# Patient Record
Sex: FEMALE | Race: WHITE | Employment: OTHER | URBAN - NONMETROPOLITAN AREA
[De-identification: names, ages, dates, MRNs, and addresses within clinical notes are randomized per-mention and may not be internally consistent; named-entity substitution may affect disease eponyms.]

---

## 2017-01-01 ENCOUNTER — OFFICE VISIT (OUTPATIENT)
Dept: URGENT CARE | Age: 52
End: 2017-01-01
Payer: COMMERCIAL

## 2017-01-01 VITALS
TEMPERATURE: 99 F | SYSTOLIC BLOOD PRESSURE: 121 MMHG | OXYGEN SATURATION: 97 % | BODY MASS INDEX: 21.53 KG/M2 | DIASTOLIC BLOOD PRESSURE: 80 MMHG | HEIGHT: 66 IN | RESPIRATION RATE: 18 BRPM | WEIGHT: 134 LBS | HEART RATE: 101 BPM

## 2017-01-01 DIAGNOSIS — H65.91 OTITIS MEDIA WITH EFFUSION, RIGHT: Primary | ICD-10-CM

## 2017-01-01 DIAGNOSIS — J02.9 SORE THROAT: ICD-10-CM

## 2017-01-01 DIAGNOSIS — J01.40 ACUTE NON-RECURRENT PANSINUSITIS: ICD-10-CM

## 2017-01-01 LAB — S PYO AG THROAT QL: NORMAL

## 2017-01-01 PROCEDURE — 87880 STREP A ASSAY W/OPTIC: CPT | Performed by: NURSE PRACTITIONER

## 2017-01-01 PROCEDURE — 99213 OFFICE O/P EST LOW 20 MIN: CPT | Performed by: NURSE PRACTITIONER

## 2017-01-01 RX ORDER — ESTRADIOL 1 MG/1
1 TABLET ORAL DAILY
COMMUNITY
Start: 2016-11-29

## 2017-01-01 RX ORDER — PANTOPRAZOLE SODIUM 40 MG/1
40 TABLET, DELAYED RELEASE ORAL DAILY
COMMUNITY
Start: 2016-12-08 | End: 2019-03-25 | Stop reason: ALTCHOICE

## 2017-01-01 RX ORDER — CETIRIZINE HYDROCHLORIDE 10 MG/1
10 TABLET ORAL DAILY
COMMUNITY
End: 2018-07-17 | Stop reason: CLARIF

## 2017-01-01 RX ORDER — TEMAZEPAM 30 MG/1
30 CAPSULE ORAL DAILY
COMMUNITY
Start: 2016-12-29 | End: 2018-04-17 | Stop reason: SDUPTHER

## 2017-01-01 RX ORDER — AMOXICILLIN AND CLAVULANATE POTASSIUM 875; 125 MG/1; MG/1
1 TABLET, FILM COATED ORAL EVERY 12 HOURS
Qty: 20 TABLET | Refills: 0 | Status: SHIPPED | OUTPATIENT
Start: 2017-01-01 | End: 2017-01-11

## 2017-01-01 RX ORDER — VENLAFAXINE HYDROCHLORIDE 75 MG/1
75 CAPSULE, EXTENDED RELEASE ORAL DAILY
COMMUNITY
Start: 2016-12-08 | End: 2018-07-17

## 2017-01-01 RX ORDER — POLYETHYLENE GLYCOL 3350 17 G/17G
1 POWDER, FOR SOLUTION ORAL PRN
COMMUNITY
Start: 2016-11-14

## 2017-01-01 ASSESSMENT — ENCOUNTER SYMPTOMS
TROUBLE SWALLOWING: 1
SINUS PRESSURE: 1
GASTROINTESTINAL NEGATIVE: 1
SORE THROAT: 1
RHINORRHEA: 0
COUGH: 1

## 2018-02-05 ENCOUNTER — OFFICE VISIT (OUTPATIENT)
Dept: URGENT CARE | Age: 53
End: 2018-02-05
Payer: COMMERCIAL

## 2018-02-05 VITALS
SYSTOLIC BLOOD PRESSURE: 114 MMHG | OXYGEN SATURATION: 100 % | WEIGHT: 140.4 LBS | TEMPERATURE: 98.9 F | HEIGHT: 66 IN | DIASTOLIC BLOOD PRESSURE: 76 MMHG | HEART RATE: 92 BPM | BODY MASS INDEX: 22.56 KG/M2 | RESPIRATION RATE: 18 BRPM

## 2018-02-05 DIAGNOSIS — R52 BODY ACHES: Primary | ICD-10-CM

## 2018-02-05 LAB
INFLUENZA A ANTIBODY: NEGATIVE
INFLUENZA B ANTIBODY: NEGATIVE

## 2018-02-05 PROCEDURE — 99213 OFFICE O/P EST LOW 20 MIN: CPT | Performed by: NURSE PRACTITIONER

## 2018-02-05 PROCEDURE — 87804 INFLUENZA ASSAY W/OPTIC: CPT | Performed by: NURSE PRACTITIONER

## 2018-02-05 RX ORDER — GRAPE SEED EXT/BIOFLAV,CITRUS 50MG-250MG
540 CAPSULE ORAL
Status: ON HOLD | COMMUNITY
End: 2021-02-09 | Stop reason: ALTCHOICE

## 2018-02-05 RX ORDER — FLUTICASONE PROPIONATE 50 MCG
2 SPRAY, SUSPENSION (ML) NASAL
COMMUNITY
End: 2021-11-15 | Stop reason: ALTCHOICE

## 2018-02-05 RX ORDER — METHYLPREDNISOLONE 4 MG/1
4 TABLET ORAL SEE ADMIN INSTRUCTIONS
COMMUNITY
End: 2018-04-17 | Stop reason: CLARIF

## 2018-02-05 ASSESSMENT — ENCOUNTER SYMPTOMS
VOMITING: 0
DIARRHEA: 0
NAUSEA: 0
RHINORRHEA: 0
SHORTNESS OF BREATH: 0
SINUS PRESSURE: 0
SORE THROAT: 0
ABDOMINAL PAIN: 0
COUGH: 0

## 2018-02-05 NOTE — PROGRESS NOTES
[address] [facility]  [address] [facility] [place] [address]  Dept: [phone]  Loc: [phone]    Ronnie Santana is a 46 y.o. female who presents today for her medical conditions/complaints as noted below. Ronnie Santana is c/o of Generalized Body Aches; Fever; and Fatigue        HPI:     MARTIN Patten presents today with fatigue that started yesterday. She also presents with body aches that started this morning. She reports chills and a fever of 99 this morning. She reports that she feels worse as the day has progressed. She denies any abdominal pain, nausea, vomiting, and diarrhea. She denies any upper respiratory symptoms. Past Medical History:   Diagnosis Date    Allergic rhinitis     Anxiety     Irritable bowel syndrome     Sinus infection       Past Surgical History:   Procedure Laterality Date    HYSTERECTOMY      SINUS SURGERY      TONSILLECTOMY      WISDOM TOOTH EXTRACTION         Family History   Problem Relation Age of Onset    Other Father        Social History   Substance Use Topics    Smoking status: Never Smoker    Smokeless tobacco: Never Used    Alcohol use No      Current Outpatient Prescriptions   Medication Sig Dispense Refill    Black Cohosh 540 MG CAPS Take 540 mg by mouth      fluticasone (FLONASE) 50 MCG/ACT nasal spray 2 sprays      methylPREDNISolone (MEDROL DOSEPACK) 4 MG tablet Take 4 mg by mouth See Admin Instructions Take by mouth.       estradiol (ESTRACE) 2 MG tablet Take 2 mg by mouth daily      pantoprazole (PROTONIX) 40 MG tablet Take 40 mg by mouth daily      polyethylene glycol (GLYCOLAX) powder Take 1 g by mouth as needed      venlafaxine (EFFEXOR XR) 37.5 MG extended release capsule Take 1 capsule by mouth daily      temazepam (RESTORIL) 30 MG capsule Take 30 mg by mouth daily      cetirizine (ZYRTEC) 10 MG tablet Take 10 mg by mouth daily       No current facility-administered medications for this mood and affect. Her behavior is normal.     /76   Pulse 92   Temp 98.9 °F (37.2 °C) (Oral)   Resp 18   Ht 5' 6\" (1.676 m)   Wt 140 lb 6.4 oz (63.7 kg)   SpO2 100%   BMI 22.66 kg/m²     Assessment:      1. Body aches  POCT Influenza A/B       Plan:   Flu in office negative. Pt to take tylenol for body aches. Monitor for fever. Monitor for worsening symptoms. Encouraged rest and increased fluid intake. Return to clinic if worsening symptoms or high fevers. Orders Placed This Encounter   Procedures    POCT Influenza A/B     Results for orders placed or performed in visit on 02/05/18   POCT Influenza A/B   Result Value Ref Range    Influenza A Ab NEGATIVE     Influenza B Ab NEGATIVE          Return if symptoms worsen or fail to improve. No orders of the defined types were placed in this encounter. Patient given educational materials - see patient instructions. Discussed use, benefit, and side effects of prescribed medications. All patient questions answered. Pt voiced understanding. Reviewed health maintenance. Instructed to continue current medications, diet and exercise. Patient agreed with treatment plan. Follow up as directed. Patient Instructions     1. Take tylenol for body aches and fever  2. Monitor for fever  3. Rest and increased fluid intake  4. If worsening symptoms return to clinic     Patient Education        Fatigue: Care Instructions  Your Care Instructions    Fatigue is a feeling of tiredness, exhaustion, or lack of energy. You may feel fatigue because of too much or not enough activity. It can also come from stress, lack of sleep, boredom, and poor diet. Many medical problems, such as viral infections, can cause fatigue. Emotional problems, especially depression, are often the cause of fatigue. Fatigue is most often a symptom of another problem. Treatment for fatigue depends on the cause.  For example, if you have fatigue because you have a certain health problem, treating this problem also treats your fatigue. If depression or anxiety is the cause, treatment may help. Follow-up care is a key part of your treatment and safety. Be sure to make and go to all appointments, and call your doctor if you are having problems. It's also a good idea to know your test results and keep a list of the medicines you take. How can you care for yourself at home? · Get regular exercise. But don't overdo it. Go back and forth between rest and exercise. · Get plenty of rest.  · Eat a healthy diet. Do not skip meals, especially breakfast.  · Reduce your use of caffeine, tobacco, and alcohol. Caffeine is most often found in coffee, tea, cola drinks, and chocolate. · Limit medicines that can cause fatigue. This includes tranquilizers and cold and allergy medicines. When should you call for help? Watch closely for changes in your health, and be sure to contact your doctor if:  ? · You have new symptoms such as fever or a rash. ? · Your fatigue gets worse. ? · You have been feeling down, depressed, or hopeless. Or you may have lost interest in things that you usually enjoy. ? · You are not getting better as expected. Where can you learn more? Go to [email or web address] org and sign in to your [employer] account. Enter A874 in the [employer] box to learn more about \"Fatigue: Care Instructions. \"     If you do not have an account, please click on the \"Sign Up Now\" link. Current as of: March 20, 2017  Content Version: 11.5  © 1333-6139 Healthwise, Incorporated. Care instructions adapted under license by [facility] [employer] [facility] ([facility]). If you have questions about a medical condition or this instruction, always ask your healthcare professional. [place] [address] any warranty or liability for your use of this information.              Electronically signed by GRACIE Oh on 2/5/2018 at 2:06 PM

## 2018-04-17 ENCOUNTER — OFFICE VISIT (OUTPATIENT)
Dept: INTERNAL MEDICINE | Age: 53
End: 2018-04-17
Payer: COMMERCIAL

## 2018-04-17 VITALS
OXYGEN SATURATION: 99 % | BODY MASS INDEX: 21.69 KG/M2 | HEART RATE: 101 BPM | SYSTOLIC BLOOD PRESSURE: 120 MMHG | DIASTOLIC BLOOD PRESSURE: 76 MMHG | HEIGHT: 66 IN | WEIGHT: 135 LBS

## 2018-04-17 DIAGNOSIS — E28.39 MENOPAUSE OVARIAN FAILURE: ICD-10-CM

## 2018-04-17 DIAGNOSIS — Z12.12 SCREENING FOR COLORECTAL CANCER: ICD-10-CM

## 2018-04-17 DIAGNOSIS — K21.9 GASTROESOPHAGEAL REFLUX DISEASE WITHOUT ESOPHAGITIS: ICD-10-CM

## 2018-04-17 DIAGNOSIS — F41.1 GENERALIZED ANXIETY DISORDER: ICD-10-CM

## 2018-04-17 DIAGNOSIS — Z12.31 SCREENING MAMMOGRAM, ENCOUNTER FOR: ICD-10-CM

## 2018-04-17 DIAGNOSIS — Z23 NEED FOR SHINGLES VACCINE: ICD-10-CM

## 2018-04-17 DIAGNOSIS — Z00.00 ANNUAL PHYSICAL EXAM: Primary | ICD-10-CM

## 2018-04-17 DIAGNOSIS — Z12.11 SCREENING FOR COLORECTAL CANCER: ICD-10-CM

## 2018-04-17 PROCEDURE — 99396 PREV VISIT EST AGE 40-64: CPT | Performed by: INTERNAL MEDICINE

## 2018-04-17 RX ORDER — BUDESONIDE 0.5 MG/2ML
500 INHALANT ORAL
COMMUNITY
Start: 2017-01-25 | End: 2019-04-22 | Stop reason: CLARIF

## 2018-04-17 RX ORDER — TEMAZEPAM 30 MG/1
30 CAPSULE ORAL DAILY
Qty: 30 CAPSULE | Refills: 2 | Status: SHIPPED | OUTPATIENT
Start: 2018-04-17 | End: 2018-07-17 | Stop reason: SDUPTHER

## 2018-04-17 RX ORDER — CETIRIZINE HYDROCHLORIDE, PSEUDOEPHEDRINE HYDROCHLORIDE 5; 120 MG/1; MG/1
1 TABLET, FILM COATED, EXTENDED RELEASE ORAL
COMMUNITY
Start: 2017-02-22 | End: 2019-03-25 | Stop reason: ALTCHOICE

## 2018-04-17 ASSESSMENT — PATIENT HEALTH QUESTIONNAIRE - PHQ9
SUM OF ALL RESPONSES TO PHQ9 QUESTIONS 1 & 2: 0
SUM OF ALL RESPONSES TO PHQ QUESTIONS 1-9: 0
2. FEELING DOWN, DEPRESSED OR HOPELESS: 0
1. LITTLE INTEREST OR PLEASURE IN DOING THINGS: 0

## 2018-04-24 ENCOUNTER — PATIENT MESSAGE (OUTPATIENT)
Dept: INTERNAL MEDICINE | Age: 53
End: 2018-04-24

## 2018-04-24 DIAGNOSIS — Z00.00 ANNUAL PHYSICAL EXAM: ICD-10-CM

## 2018-04-24 DIAGNOSIS — E28.39 MENOPAUSE OVARIAN FAILURE: ICD-10-CM

## 2018-04-24 LAB
ALBUMIN SERPL-MCNC: 4.2 G/DL (ref 3.5–5.2)
ALP BLD-CCNC: 70 U/L (ref 35–104)
ALT SERPL-CCNC: 17 U/L (ref 5–33)
ANION GAP SERPL CALCULATED.3IONS-SCNC: 11 MMOL/L (ref 7–19)
AST SERPL-CCNC: 22 U/L (ref 5–32)
BACTERIA: ABNORMAL /HPF
BASOPHILS ABSOLUTE: 0 K/UL (ref 0–0.2)
BASOPHILS RELATIVE PERCENT: 0.4 % (ref 0–1)
BILIRUB SERPL-MCNC: 0.5 MG/DL (ref 0.2–1.2)
BILIRUBIN URINE: NEGATIVE
BLOOD, URINE: NEGATIVE
BUN BLDV-MCNC: 14 MG/DL (ref 6–20)
CALCIUM SERPL-MCNC: 9.4 MG/DL (ref 8.6–10)
CHLORIDE BLD-SCNC: 103 MMOL/L (ref 98–111)
CHOLESTEROL, TOTAL: 190 MG/DL (ref 160–199)
CLARITY: ABNORMAL
CO2: 29 MMOL/L (ref 22–29)
COLOR: YELLOW
CREAT SERPL-MCNC: 0.6 MG/DL (ref 0.5–0.9)
EOSINOPHILS ABSOLUTE: 0.1 K/UL (ref 0–0.6)
EOSINOPHILS RELATIVE PERCENT: 1.3 % (ref 0–5)
EPITHELIAL CELLS, UA: 14 /HPF (ref 0–5)
GFR NON-AFRICAN AMERICAN: >60
GLUCOSE BLD-MCNC: 92 MG/DL (ref 74–109)
GLUCOSE URINE: NEGATIVE MG/DL
HCT VFR BLD CALC: 40.8 % (ref 37–47)
HDLC SERPL-MCNC: 82 MG/DL (ref 65–121)
HEMOGLOBIN: 12.8 G/DL (ref 12–16)
HYALINE CASTS: 1 /HPF (ref 0–8)
KETONES, URINE: NEGATIVE MG/DL
LDL CHOLESTEROL CALCULATED: 85 MG/DL
LEUKOCYTE ESTERASE, URINE: ABNORMAL
LYMPHOCYTES ABSOLUTE: 1.8 K/UL (ref 1.1–4.5)
LYMPHOCYTES RELATIVE PERCENT: 37.6 % (ref 20–40)
MCH RBC QN AUTO: 28.6 PG (ref 27–31)
MCHC RBC AUTO-ENTMCNC: 31.4 G/DL (ref 33–37)
MCV RBC AUTO: 91.3 FL (ref 81–99)
MONOCYTES ABSOLUTE: 0.5 K/UL (ref 0–0.9)
MONOCYTES RELATIVE PERCENT: 10.1 % (ref 0–10)
NEUTROPHILS ABSOLUTE: 2.3 K/UL (ref 1.5–7.5)
NEUTROPHILS RELATIVE PERCENT: 50.4 % (ref 50–65)
NITRITE, URINE: NEGATIVE
PDW BLD-RTO: 16.2 % (ref 11.5–14.5)
PH UA: 7
PLATELET # BLD: 188 K/UL (ref 130–400)
PMV BLD AUTO: 11.3 FL (ref 9.4–12.3)
POTASSIUM SERPL-SCNC: 4.2 MMOL/L (ref 3.5–5)
PROTEIN UA: NEGATIVE MG/DL
RBC # BLD: 4.47 M/UL (ref 4.2–5.4)
RBC UA: 1 /HPF (ref 0–4)
SODIUM BLD-SCNC: 143 MMOL/L (ref 136–145)
SPECIFIC GRAVITY UA: 1.02
TOTAL PROTEIN: 6.9 G/DL (ref 6.6–8.7)
TRIGL SERPL-MCNC: 117 MG/DL (ref 0–149)
TSH SERPL DL<=0.05 MIU/L-ACNC: 2.59 UIU/ML (ref 0.27–4.2)
UROBILINOGEN, URINE: 0.2 E.U./DL
VITAMIN D 25-HYDROXY: 49.7 NG/ML
WBC # BLD: 4.7 K/UL (ref 4.8–10.8)
WBC UA: 12 /HPF (ref 0–5)

## 2018-04-26 ENCOUNTER — HOSPITAL ENCOUNTER (OUTPATIENT)
Dept: WOMENS IMAGING | Age: 53
Discharge: HOME | End: 2018-04-26
Payer: COMMERCIAL

## 2018-04-26 DIAGNOSIS — Z12.31 SCREENING MAMMOGRAM, ENCOUNTER FOR: ICD-10-CM

## 2018-04-26 PROCEDURE — 77067 SCR MAMMO BI INCL CAD: CPT

## 2018-05-02 ENCOUNTER — PATIENT MESSAGE (OUTPATIENT)
Dept: INTERNAL MEDICINE | Age: 53
End: 2018-05-02

## 2018-05-02 DIAGNOSIS — R35.0 URINARY FREQUENCY: Primary | ICD-10-CM

## 2018-05-04 DIAGNOSIS — R35.0 URINARY FREQUENCY: ICD-10-CM

## 2018-05-04 LAB
BACTERIA: NEGATIVE /HPF
BILIRUBIN URINE: NEGATIVE
BLOOD, URINE: NEGATIVE
CLARITY: CLEAR
COLOR: YELLOW
EPITHELIAL CELLS, UA: 2 /HPF (ref 0–5)
GLUCOSE URINE: NEGATIVE MG/DL
HYALINE CASTS: 0 /HPF (ref 0–8)
KETONES, URINE: NEGATIVE MG/DL
LEUKOCYTE ESTERASE, URINE: ABNORMAL
NITRITE, URINE: NEGATIVE
PH UA: 6.5
PROTEIN UA: NEGATIVE MG/DL
RBC UA: 0 /HPF (ref 0–4)
SPECIFIC GRAVITY UA: 1.01
URINE REFLEX TO CULTURE: YES
UROBILINOGEN, URINE: 0.2 E.U./DL
WBC UA: 4 /HPF (ref 0–5)

## 2018-05-06 LAB — URINE CULTURE, ROUTINE: NORMAL

## 2018-05-17 PROBLEM — Z00.00 ANNUAL PHYSICAL EXAM: Status: RESOLVED | Noted: 2018-04-17 | Resolved: 2018-05-17

## 2018-05-17 PROBLEM — Z12.11 SCREENING FOR COLORECTAL CANCER: Status: RESOLVED | Noted: 2018-04-17 | Resolved: 2018-05-17

## 2018-05-17 PROBLEM — Z12.12 SCREENING FOR COLORECTAL CANCER: Status: RESOLVED | Noted: 2018-04-17 | Resolved: 2018-05-17

## 2018-05-17 PROBLEM — Z12.31 SCREENING MAMMOGRAM, ENCOUNTER FOR: Status: RESOLVED | Noted: 2018-04-17 | Resolved: 2018-05-17

## 2018-07-17 ENCOUNTER — OFFICE VISIT (OUTPATIENT)
Dept: INTERNAL MEDICINE | Age: 53
End: 2018-07-17
Payer: COMMERCIAL

## 2018-07-17 VITALS
SYSTOLIC BLOOD PRESSURE: 118 MMHG | OXYGEN SATURATION: 98 % | DIASTOLIC BLOOD PRESSURE: 68 MMHG | HEIGHT: 66 IN | HEART RATE: 82 BPM | BODY MASS INDEX: 22.34 KG/M2 | WEIGHT: 139 LBS

## 2018-07-17 DIAGNOSIS — F51.01 PRIMARY INSOMNIA: ICD-10-CM

## 2018-07-17 DIAGNOSIS — M85.89 OSTEOPENIA OF MULTIPLE SITES: ICD-10-CM

## 2018-07-17 DIAGNOSIS — F41.1 GENERALIZED ANXIETY DISORDER: Primary | ICD-10-CM

## 2018-07-17 DIAGNOSIS — R42 ORTHOSTATIC DIZZINESS: ICD-10-CM

## 2018-07-17 PROBLEM — M85.80 OSTEOPENIA: Status: ACTIVE | Noted: 2018-07-17

## 2018-07-17 PROBLEM — M85.852 OSTEOPENIA OF LEFT THIGH: Status: ACTIVE | Noted: 2018-07-17

## 2018-07-17 PROBLEM — M85.80 OSTEOPENIA: Status: RESOLVED | Noted: 2018-07-17 | Resolved: 2018-07-17

## 2018-07-17 PROCEDURE — 99214 OFFICE O/P EST MOD 30 MIN: CPT | Performed by: INTERNAL MEDICINE

## 2018-07-17 RX ORDER — TEMAZEPAM 30 MG/1
30 CAPSULE ORAL DAILY
Qty: 30 CAPSULE | Refills: 2 | Status: SHIPPED | OUTPATIENT
Start: 2018-07-17 | End: 2019-01-09 | Stop reason: SDUPTHER

## 2018-07-17 RX ORDER — VENLAFAXINE HYDROCHLORIDE 37.5 MG/1
37.5 CAPSULE, EXTENDED RELEASE ORAL DAILY
Qty: 90 CAPSULE | Refills: 1 | Status: SHIPPED | OUTPATIENT
Start: 2018-07-17 | End: 2018-09-13 | Stop reason: SDUPTHER

## 2018-07-17 ASSESSMENT — ENCOUNTER SYMPTOMS
COUGH: 0
CHEST TIGHTNESS: 0
ABDOMINAL PAIN: 0
WHEEZING: 0
SORE THROAT: 0
CONSTIPATION: 0

## 2018-07-17 NOTE — PROGRESS NOTES
hours     Bacteria, UA 05/04/2018 NEGATIVE     Hyaline Casts, UA 05/04/2018 0     WBC, UA 05/04/2018 4     RBC,  05/04/2018 0     Epi Cells 05/04/2018 2            ASSESSMENT/PLAN:    Generalized anxiety disorder    Her anxiety symptoms overall are better  Patient is wanting to decrease her Effexor dose  We are decreasing Effexor today 37.5 daily, new prescription sent in      Primary insomnia  Refill Restoril for when necessary use  [place] reviewed  [place] was reviewed  On exam today and as per discussions with the patient today there is no evidence of adverse events such as cognitive impairment, sedation, constipation or falls related to prescribed medications. There is also no evidence of aberrant behavior like lost prescriptions or early refill requests or multiple prescribers for controlled substances.     Patient  was advised NOT to attempt to drive a motor vehichle or operate any heavy machinery within 6 hrs of taking the presribed medication - restoril      Osteopenia of left thigh  I reviewed and compared patient's bone density results  According to the patient about 7 years ago she had normal bone density  Bone density that was done at orthopedic [place] in March of this year shows that her   * lumbar spine average T score is -1.45, she does have T score of -2.2 at L4  * Left hip T score -1.8, neck -1.9,troch -2.1  * Right hip total -1.6, neck -1.9,troch -1.8  * Radius total -1.5    Patient is currently 46years old, is on estrogen replacement therapy 2 mg daily  At this time I suggest that we would repeat bone density in 2 years, in the spring of 2020  Monitor this closely  Since she is on estrogen therapy would not start Boniva at this time  We have discussed with the patient however that if it was to decline further, with her strong family history of osteoporosis, we would need to start her on bisphosphonate    Orthostatic dizziness-   Discussed the patient that her dizziness symptoms are

## 2018-09-13 RX ORDER — VENLAFAXINE HYDROCHLORIDE 37.5 MG/1
37.5 CAPSULE, EXTENDED RELEASE ORAL DAILY
Qty: 30 CAPSULE | Refills: 5 | Status: SHIPPED | OUTPATIENT
Start: 2018-09-13 | End: 2019-04-22 | Stop reason: CLARIF

## 2019-01-09 ENCOUNTER — OFFICE VISIT (OUTPATIENT)
Dept: INTERNAL MEDICINE | Age: 54
End: 2019-01-09
Payer: COMMERCIAL

## 2019-01-09 VITALS
BODY MASS INDEX: 22.5 KG/M2 | DIASTOLIC BLOOD PRESSURE: 78 MMHG | HEIGHT: 66 IN | SYSTOLIC BLOOD PRESSURE: 104 MMHG | WEIGHT: 140 LBS | OXYGEN SATURATION: 98 % | HEART RATE: 79 BPM | RESPIRATION RATE: 18 BRPM

## 2019-01-09 DIAGNOSIS — Z00.00 ANNUAL PHYSICAL EXAM: ICD-10-CM

## 2019-01-09 DIAGNOSIS — F51.01 PRIMARY INSOMNIA: ICD-10-CM

## 2019-01-09 DIAGNOSIS — K21.9 GASTROESOPHAGEAL REFLUX DISEASE WITHOUT ESOPHAGITIS: ICD-10-CM

## 2019-01-09 DIAGNOSIS — M85.89 OSTEOPENIA OF MULTIPLE SITES: ICD-10-CM

## 2019-01-09 DIAGNOSIS — F41.1 GENERALIZED ANXIETY DISORDER: Primary | ICD-10-CM

## 2019-01-09 PROCEDURE — 99214 OFFICE O/P EST MOD 30 MIN: CPT | Performed by: INTERNAL MEDICINE

## 2019-01-09 RX ORDER — TEMAZEPAM 30 MG/1
30 CAPSULE ORAL DAILY
Qty: 30 CAPSULE | Refills: 2 | Status: SHIPPED | OUTPATIENT
Start: 2019-01-09 | End: 2019-02-08

## 2019-01-09 ASSESSMENT — ENCOUNTER SYMPTOMS
WHEEZING: 0
SORE THROAT: 0
CONSTIPATION: 0
CHEST TIGHTNESS: 0
ABDOMINAL PAIN: 0
COUGH: 0

## 2019-02-21 ENCOUNTER — OFFICE VISIT (OUTPATIENT)
Dept: INTERNAL MEDICINE | Age: 54
End: 2019-02-21
Payer: COMMERCIAL

## 2019-02-21 ENCOUNTER — HOSPITAL ENCOUNTER (OUTPATIENT)
Dept: GENERAL RADIOLOGY | Age: 54
Discharge: HOME | End: 2019-02-21
Payer: COMMERCIAL

## 2019-02-21 ENCOUNTER — HOSPITAL ENCOUNTER (OUTPATIENT)
Dept: NON INVASIVE DIAGNOSTICS | Age: 54
Discharge: HOME | End: 2019-02-21
Payer: COMMERCIAL

## 2019-02-21 VITALS
HEIGHT: 66 IN | OXYGEN SATURATION: 99 % | BODY MASS INDEX: 22.02 KG/M2 | WEIGHT: 137 LBS | SYSTOLIC BLOOD PRESSURE: 112 MMHG | HEART RATE: 61 BPM | DIASTOLIC BLOOD PRESSURE: 62 MMHG

## 2019-02-21 DIAGNOSIS — R06.09 DYSPNEA ON EXERTION: ICD-10-CM

## 2019-02-21 DIAGNOSIS — R06.09 DYSPNEA ON EXERTION: Primary | ICD-10-CM

## 2019-02-21 LAB
ALBUMIN SERPL-MCNC: 4.3 G/DL (ref 3.5–5.2)
ALP BLD-CCNC: 79 U/L (ref 35–104)
ALT SERPL-CCNC: 14 U/L (ref 5–33)
ANION GAP SERPL CALCULATED.3IONS-SCNC: 13 MMOL/L (ref 7–19)
AST SERPL-CCNC: 20 U/L (ref 5–32)
BASOPHILS ABSOLUTE: 0 K/UL (ref 0–0.2)
BASOPHILS RELATIVE PERCENT: 0.4 % (ref 0–1)
BILIRUB SERPL-MCNC: 0.5 MG/DL (ref 0.2–1.2)
BUN BLDV-MCNC: 13 MG/DL (ref 6–20)
CALCIUM SERPL-MCNC: 9.8 MG/DL (ref 8.6–10)
CHLORIDE BLD-SCNC: 106 MMOL/L (ref 98–111)
CO2: 26 MMOL/L (ref 22–29)
CREAT SERPL-MCNC: 0.7 MG/DL (ref 0.5–0.9)
EOSINOPHILS ABSOLUTE: 0.1 K/UL (ref 0–0.6)
EOSINOPHILS RELATIVE PERCENT: 0.9 % (ref 0–5)
GFR NON-AFRICAN AMERICAN: >60
GLUCOSE BLD-MCNC: 96 MG/DL (ref 74–109)
HCT VFR BLD CALC: 41.7 % (ref 37–47)
HEMOGLOBIN: 13 G/DL (ref 12–16)
LYMPHOCYTES ABSOLUTE: 2.5 K/UL (ref 1.1–4.5)
LYMPHOCYTES RELATIVE PERCENT: 31.3 % (ref 20–40)
MCH RBC QN AUTO: 28.7 PG (ref 27–31)
MCHC RBC AUTO-ENTMCNC: 31.2 G/DL (ref 33–37)
MCV RBC AUTO: 92.1 FL (ref 81–99)
MONOCYTES ABSOLUTE: 0.6 K/UL (ref 0–0.9)
MONOCYTES RELATIVE PERCENT: 7.8 % (ref 0–10)
NEUTROPHILS ABSOLUTE: 4.8 K/UL (ref 1.5–7.5)
NEUTROPHILS RELATIVE PERCENT: 59.5 % (ref 50–65)
PDW BLD-RTO: 16.1 % (ref 11.5–14.5)
PLATELET # BLD: 183 K/UL (ref 130–400)
PMV BLD AUTO: 11.5 FL (ref 9.4–12.3)
POTASSIUM SERPL-SCNC: 4.7 MMOL/L (ref 3.5–5)
RBC # BLD: 4.53 M/UL (ref 4.2–5.4)
SODIUM BLD-SCNC: 145 MMOL/L (ref 136–145)
TOTAL PROTEIN: 7.4 G/DL (ref 6.6–8.7)
WBC # BLD: 8.1 K/UL (ref 4.8–10.8)

## 2019-02-21 PROCEDURE — 93225 XTRNL ECG REC<48 HRS REC: CPT

## 2019-02-21 PROCEDURE — 93226 XTRNL ECG REC<48 HR SCAN A/R: CPT

## 2019-02-21 PROCEDURE — 99214 OFFICE O/P EST MOD 30 MIN: CPT | Performed by: NURSE PRACTITIONER

## 2019-02-21 PROCEDURE — 71046 X-RAY EXAM CHEST 2 VIEWS: CPT

## 2019-02-21 PROCEDURE — 93000 ELECTROCARDIOGRAM COMPLETE: CPT | Performed by: NURSE PRACTITIONER

## 2019-02-21 RX ORDER — TOPIRAMATE 25 MG/1
25 TABLET ORAL DAILY
COMMUNITY

## 2019-02-21 ASSESSMENT — ENCOUNTER SYMPTOMS
PHOTOPHOBIA: 0
COUGH: 0
COLOR CHANGE: 0
RHINORRHEA: 0
VOMITING: 0
VOICE CHANGE: 0
SHORTNESS OF BREATH: 1
NAUSEA: 0
BACK PAIN: 0

## 2019-02-28 ENCOUNTER — OFFICE VISIT (OUTPATIENT)
Dept: INTERNAL MEDICINE | Age: 54
End: 2019-02-28
Payer: COMMERCIAL

## 2019-02-28 VITALS
WEIGHT: 132 LBS | SYSTOLIC BLOOD PRESSURE: 120 MMHG | BODY MASS INDEX: 21.21 KG/M2 | DIASTOLIC BLOOD PRESSURE: 80 MMHG | HEIGHT: 66 IN | HEART RATE: 79 BPM | OXYGEN SATURATION: 99 %

## 2019-02-28 DIAGNOSIS — F41.9 ANXIETY: Chronic | ICD-10-CM

## 2019-02-28 DIAGNOSIS — R06.02 SHORTNESS OF BREATH: Primary | ICD-10-CM

## 2019-02-28 PROCEDURE — 99214 OFFICE O/P EST MOD 30 MIN: CPT | Performed by: NURSE PRACTITIONER

## 2019-02-28 RX ORDER — LORAZEPAM 0.5 MG/1
0.5 TABLET ORAL EVERY 8 HOURS PRN
Qty: 20 TABLET | Refills: 0 | Status: SHIPPED | OUTPATIENT
Start: 2019-02-28 | End: 2019-03-30

## 2019-02-28 ASSESSMENT — ENCOUNTER SYMPTOMS
PHOTOPHOBIA: 0
COLOR CHANGE: 0
COUGH: 0
NAUSEA: 0
VOMITING: 0
BACK PAIN: 0
RHINORRHEA: 0
VOICE CHANGE: 0
SHORTNESS OF BREATH: 0

## 2019-02-28 ASSESSMENT — PATIENT HEALTH QUESTIONNAIRE - PHQ9
SUM OF ALL RESPONSES TO PHQ QUESTIONS 1-9: 0
1. LITTLE INTEREST OR PLEASURE IN DOING THINGS: 0
2. FEELING DOWN, DEPRESSED OR HOPELESS: 0
SUM OF ALL RESPONSES TO PHQ9 QUESTIONS 1 & 2: 0
SUM OF ALL RESPONSES TO PHQ QUESTIONS 1-9: 0

## 2019-03-02 ENCOUNTER — OFFICE VISIT (OUTPATIENT)
Dept: URGENT CARE | Age: 54
End: 2019-03-02
Payer: COMMERCIAL

## 2019-03-02 VITALS
TEMPERATURE: 99.4 F | HEART RATE: 77 BPM | RESPIRATION RATE: 16 BRPM | SYSTOLIC BLOOD PRESSURE: 106 MMHG | OXYGEN SATURATION: 98 % | WEIGHT: 139 LBS | BODY MASS INDEX: 22.44 KG/M2 | DIASTOLIC BLOOD PRESSURE: 76 MMHG

## 2019-03-02 DIAGNOSIS — J98.01 ACUTE BRONCHOSPASM: ICD-10-CM

## 2019-03-02 DIAGNOSIS — J20.9 ACUTE BRONCHITIS, UNSPECIFIED ORGANISM: Primary | ICD-10-CM

## 2019-03-02 DIAGNOSIS — R05.9 COUGH: ICD-10-CM

## 2019-03-02 PROCEDURE — 99213 OFFICE O/P EST LOW 20 MIN: CPT | Performed by: FAMILY MEDICINE

## 2019-03-02 RX ORDER — GUAIFENESIN 600 MG/1
600 TABLET, EXTENDED RELEASE ORAL 2 TIMES DAILY
Qty: 30 TABLET | Refills: 0 | Status: SHIPPED | OUTPATIENT
Start: 2019-03-02 | End: 2019-03-17

## 2019-03-02 RX ORDER — AZITHROMYCIN 250 MG/1
250 TABLET, FILM COATED ORAL SEE ADMIN INSTRUCTIONS
Qty: 6 TABLET | Refills: 0 | Status: SHIPPED | OUTPATIENT
Start: 2019-03-02 | End: 2019-03-07

## 2019-03-02 RX ORDER — ALBUTEROL SULFATE 90 UG/1
2 AEROSOL, METERED RESPIRATORY (INHALATION) 4 TIMES DAILY PRN
Qty: 1 INHALER | Refills: 0 | Status: SHIPPED | OUTPATIENT
Start: 2019-03-02 | End: 2019-04-22 | Stop reason: CLARIF

## 2019-03-02 ASSESSMENT — ENCOUNTER SYMPTOMS
SHORTNESS OF BREATH: 0
RHINORRHEA: 1
COUGH: 1
CONSTIPATION: 0
DIARRHEA: 0
ABDOMINAL PAIN: 0
EYE PAIN: 0
EYE DISCHARGE: 0
VOMITING: 0
SORE THROAT: 0
NAUSEA: 0
WHEEZING: 0

## 2019-03-25 ENCOUNTER — OFFICE VISIT (OUTPATIENT)
Dept: INTERNAL MEDICINE | Age: 54
End: 2019-03-25
Payer: COMMERCIAL

## 2019-03-25 VITALS
HEART RATE: 87 BPM | BODY MASS INDEX: 22.34 KG/M2 | SYSTOLIC BLOOD PRESSURE: 112 MMHG | DIASTOLIC BLOOD PRESSURE: 70 MMHG | HEIGHT: 66 IN | WEIGHT: 139 LBS | OXYGEN SATURATION: 98 %

## 2019-03-25 DIAGNOSIS — K21.9 GASTROESOPHAGEAL REFLUX DISEASE WITHOUT ESOPHAGITIS: Primary | ICD-10-CM

## 2019-03-25 DIAGNOSIS — F41.9 ANXIETY: Chronic | ICD-10-CM

## 2019-03-25 DIAGNOSIS — F51.01 PRIMARY INSOMNIA: Primary | ICD-10-CM

## 2019-03-25 PROCEDURE — 99214 OFFICE O/P EST MOD 30 MIN: CPT | Performed by: NURSE PRACTITIONER

## 2019-03-25 RX ORDER — DESVENLAFAXINE 25 MG/1
25 TABLET, EXTENDED RELEASE ORAL DAILY
Qty: 30 TABLET | Refills: 0 | Status: SHIPPED | OUTPATIENT
Start: 2019-03-25 | End: 2019-04-29 | Stop reason: DRUGHIGH

## 2019-03-25 RX ORDER — OMEPRAZOLE 20 MG/1
20 CAPSULE, DELAYED RELEASE ORAL 2 TIMES DAILY
COMMUNITY
End: 2019-04-22 | Stop reason: CLARIF

## 2019-03-25 RX ORDER — TEMAZEPAM 30 MG/1
CAPSULE ORAL
Qty: 30 CAPSULE | Refills: 0 | Status: SHIPPED | OUTPATIENT
Start: 2019-03-25 | End: 2019-04-22 | Stop reason: SDUPTHER

## 2019-03-25 RX ORDER — CETIRIZINE HYDROCHLORIDE 10 MG/1
10 TABLET ORAL DAILY
COMMUNITY
End: 2019-03-25 | Stop reason: SDUPTHER

## 2019-03-25 RX ORDER — CETIRIZINE HYDROCHLORIDE 10 MG/1
10 TABLET ORAL DAILY
Qty: 90 TABLET | Refills: 3 | Status: SHIPPED | OUTPATIENT
Start: 2019-03-25 | End: 2021-11-15 | Stop reason: ALTCHOICE

## 2019-03-25 RX ORDER — PANTOPRAZOLE SODIUM 40 MG/1
40 TABLET, DELAYED RELEASE ORAL DAILY
Qty: 30 TABLET | Refills: 3 | Status: SHIPPED | OUTPATIENT
Start: 2019-03-25 | End: 2019-11-14 | Stop reason: SDUPTHER

## 2019-03-25 ASSESSMENT — ENCOUNTER SYMPTOMS
NAUSEA: 0
SHORTNESS OF BREATH: 0
RHINORRHEA: 0
COLOR CHANGE: 0
VOMITING: 0
VOICE CHANGE: 0
COUGH: 0
PHOTOPHOBIA: 0
BACK PAIN: 0

## 2019-04-22 ENCOUNTER — OFFICE VISIT (OUTPATIENT)
Dept: INTERNAL MEDICINE | Age: 54
End: 2019-04-22
Payer: COMMERCIAL

## 2019-04-22 VITALS
OXYGEN SATURATION: 98 % | RESPIRATION RATE: 18 BRPM | WEIGHT: 140 LBS | SYSTOLIC BLOOD PRESSURE: 100 MMHG | HEIGHT: 66 IN | HEART RATE: 79 BPM | DIASTOLIC BLOOD PRESSURE: 68 MMHG | BODY MASS INDEX: 22.5 KG/M2

## 2019-04-22 DIAGNOSIS — F51.01 PRIMARY INSOMNIA: ICD-10-CM

## 2019-04-22 DIAGNOSIS — Z87.19 STATUS POST DILATATION OF ESOPHAGEAL STRICTURE: ICD-10-CM

## 2019-04-22 DIAGNOSIS — K21.9 GASTROESOPHAGEAL REFLUX DISEASE WITHOUT ESOPHAGITIS: ICD-10-CM

## 2019-04-22 DIAGNOSIS — Z12.39 BREAST CANCER SCREENING: ICD-10-CM

## 2019-04-22 DIAGNOSIS — Z98.890 STATUS POST DILATATION OF ESOPHAGEAL STRICTURE: ICD-10-CM

## 2019-04-22 DIAGNOSIS — F41.1 GENERALIZED ANXIETY DISORDER: Primary | ICD-10-CM

## 2019-04-22 PROBLEM — F41.9 ANXIETY: Chronic | Status: RESOLVED | Noted: 2019-02-28 | Resolved: 2019-04-22

## 2019-04-22 PROCEDURE — 99214 OFFICE O/P EST MOD 30 MIN: CPT | Performed by: INTERNAL MEDICINE

## 2019-04-22 RX ORDER — DESVENLAFAXINE 50 MG/1
50 TABLET, EXTENDED RELEASE ORAL DAILY
Qty: 30 TABLET | Refills: 3 | Status: SHIPPED | OUTPATIENT
Start: 2019-04-22 | End: 2019-08-14 | Stop reason: SDUPTHER

## 2019-04-22 RX ORDER — TEMAZEPAM 30 MG/1
CAPSULE ORAL
Qty: 30 CAPSULE | Refills: 0 | Status: SHIPPED | OUTPATIENT
Start: 2019-04-22 | End: 2019-05-20 | Stop reason: SDUPTHER

## 2019-04-22 ASSESSMENT — ENCOUNTER SYMPTOMS
BLOOD IN STOOL: 0
CHEST TIGHTNESS: 0
VOICE CHANGE: 0
ABDOMINAL PAIN: 0
COLOR CHANGE: 0
SORE THROAT: 0
EYE PAIN: 0
SINUS PRESSURE: 0
TROUBLE SWALLOWING: 0
WHEEZING: 0
DIARRHEA: 0
COUGH: 0
CONSTIPATION: 0
VOMITING: 0
NAUSEA: 0
EYE REDNESS: 0

## 2019-04-22 NOTE — PROGRESS NOTES
Chief Complaint   Patient presents with    Follow-up     1 month to follow up for Pristiq     History of presenting illness:  Alena Gonzales is a51 y.o. female who presents today for follow up on her chronic medical conditions as noted below.   He has been under a significant amount of stress related to family issues-  Her father now at assisted living at [facility] ( Parkinsonism)  Also stress associated with her grandson  Has seen Chico Vilchis several times over last few weeks  She rt today for fu  She has recently started Pristiq 25 mg daily  She feels that since she changed to Pristiq from Effexor she is doing some better     She also tells me today that her acid reflux symptoms have improved  started protonix/ stopped omeprazole 3 weeks ago  Has had previous esophagus dilatation 2017 and 2019    Patient Active Problem List    Diagnosis Date Noted    Status post dilatation of esophageal stricture 04/22/2019    Primary insomnia 07/17/2018    Osteopenia of multiple sites 07/17/2018     Overview Note:     3/2018  * lumbar spine average T score is -1.45, she does have T score of -2.2 at L4  * Left hip T score -1.8, neck -1.9,troch -2.1  * Right hip total -1.6, neck -1.9,troch -1.8  * Radius total -1.5        Orthostatic dizziness 07/17/2018    Generalized anxiety disorder 04/17/2018    Menopause ovarian failure 04/17/2018    Gastroesophageal reflux disease without esophagitis 04/17/2018     Overview Note:     Post previous esophageal dilatation       Past Medical History:   Diagnosis Date    Allergic rhinitis     Anxiety     Irritable bowel syndrome     Irritable bowel syndrome (IBS)     Sinus infection       Past Surgical History:   Procedure Laterality Date    BLADDER SURGERY  2013    vaginal tape    COLONOSCOPY      one polyp    ESOPHAGEAL DILATATION  2017 and 3/2019    HYSTERECTOMY      one ovary left    SINUS SURGERY      TONSILLECTOMY      WISDOM TOOTH EXTRACTION       Current Outpatient Medications   Medication Sig Dispense Refill    desvenlafaxine succinate (PRISTIQ) 50 MG TB24 extended release tablet Take 1 tablet by mouth daily 30 tablet 3    temazepam (RESTORIL) 30 MG capsule Take 1 capsule by mouth nightly as needed for anxiety or sleep. 30 capsule 0    cetirizine (ZYRTEC) 10 MG tablet Take 1 tablet by mouth daily 90 tablet 3    pantoprazole (PROTONIX) 40 MG tablet Take 1 tablet by mouth daily 30 tablet 3    desvenlafaxine succinate (PRISTIQ) 25 MG TB24 extended release tablet Take 1 tablet by mouth daily 30 tablet 0    topiramate (TOPAMAX) 25 MG tablet Take 25 mg by mouth 2 times daily      Probiotic Product (ALIGN PO) Take by mouth      Black Cohosh 540 MG CAPS Take 540 mg by mouth      fluticasone (FLONASE) 50 MCG/ACT nasal spray 2 sprays      estradiol (ESTRACE) 2 MG tablet Take 2 mg by mouth daily      polyethylene glycol (GLYCOLAX) powder Take 1 g by mouth as needed       No current facility-administered medications for this visit. Allergies   Allergen Reactions    Iohexol Rash     This is IV Contrast    Sulfa Antibiotics Rash     Social History     Tobacco Use    Smoking status: Never Smoker    Smokeless tobacco: Never Used   Substance Use Topics    Alcohol use: No      Family History   Problem Relation Age of Onset    Cancer Father         malt    Parkinsonism Father     Cancer Mother         lung       Review of Systems   Constitutional: Positive for fatigue. Negative for chills and fever. HENT: Positive for congestion. Negative for ear pain, postnasal drip, sinus pressure, sore throat, trouble swallowing and voice change. Eyes: Negative for pain, redness and visual disturbance. Respiratory: Negative for cough, chest tightness and wheezing. Cardiovascular: Negative for chest pain, palpitations and leg swelling. Gastrointestinal: Negative for abdominal pain, blood in stool, constipation, diarrhea, nausea and vomiting.    Endocrine: Negative for polydipsia and polyuria. Genitourinary: Negative for dysuria, enuresis, flank pain, frequency and urgency. Musculoskeletal: Negative for arthralgias, gait problem and joint swelling. Skin: Negative for color change and rash. Neurological: Negative for dizziness, tremors, syncope, facial asymmetry, speech difficulty, weakness, numbness and headaches. Psychiatric/Behavioral: Negative for agitation, behavioral problems, confusion, sleep disturbance and suicidal ideas. The patient is nervous/anxious. Vitals:    04/22/19 0928   BP: 100/68   Site: Left Upper Arm   Position: Sitting   Cuff Size: Large Adult   Pulse: 79   Resp: 18   SpO2: 98%   Weight: 140 lb (63.5 kg)   Height: 5' 6\" (1.676 m)     Body mass index is 22.6 kg/m². Physical Exam   Constitutional: She is oriented to person, place, and time. She appears well-developed and well-nourished. HENT:   Head: Normocephalic and atraumatic. Right Ear: External ear normal.   Left Ear: External ear normal.   Mouth/Throat: Oropharynx is clear and moist.   Eyes: Pupils are equal, round, and reactive to light. Conjunctivae are normal. No scleral icterus. Neck: Normal range of motion. Neck supple. No JVD present. No thyromegaly present. Cardiovascular: Normal rate, regular rhythm and normal heart sounds. No murmur heard. Pulmonary/Chest: Effort normal and breath sounds normal. No respiratory distress. She has no wheezes. She exhibits no tenderness. Abdominal: Soft. Bowel sounds are normal. She exhibits no mass. There is no tenderness. Musculoskeletal: She exhibits no edema or tenderness. Lymphadenopathy:     She has no cervical adenopathy. Neurological: She is alert and oriented to person, place, and time. She has normal reflexes. No cranial nerve deficit. Coordination normal.   Skin: Skin is warm and dry. No rash noted. No erythema. Psychiatric: She has a normal mood and affect.  Her behavior is normal.       Lab Review   No visits with results within 2 Month(s) from this visit. Latest known visit with results is:   Orders Only on 02/21/2019   Component Date Value    WBC 02/21/2019 8.1     RBC 02/21/2019 4.53     Hemoglobin 02/21/2019 13.0     Hematocrit 02/21/2019 41.7     MCV 02/21/2019 92.1     MCH 02/21/2019 28.7     MCHC 02/21/2019 31.2*    RDW 02/21/2019 16.1*    Platelets 42/90/0144 183     MPV 02/21/2019 11.5     Neutrophils % 02/21/2019 59.5     Lymphocytes % 02/21/2019 31.3     Monocytes % 02/21/2019 7.8     Eosinophils % 02/21/2019 0.9     Basophils % 02/21/2019 0.4     Neutrophils # 02/21/2019 4.8     Lymphocytes # 02/21/2019 2.5     Monocytes # 02/21/2019 0.60     Eosinophils # 02/21/2019 0.10     Basophils # 02/21/2019 0.00     Sodium 02/21/2019 145     Potassium 02/21/2019 4.7     Chloride 02/21/2019 106     CO2 02/21/2019 26     Anion Gap 02/21/2019 13     Glucose 02/21/2019 96     BUN 02/21/2019 13     CREATININE 02/21/2019 0.7     GFR Non- 02/21/2019 >60     Calcium 02/21/2019 9.8     Total Protein 02/21/2019 7.4     Alb 02/21/2019 4.3     Total Bilirubin 02/21/2019 0.5     Alkaline Phosphatase 02/21/2019 79     ALT 02/21/2019 14     AST 02/21/2019 20            ASSESSMENT/PLAN:      Generalized anxiety disorder  Increase prisitq to 50 mg daily  Re-evaluate in 4 weeks - has appt for [facility]PE with me    Primary insomnia  Refill temazepam  [place] was reviewed  On exam today and as per discussions with the patient today there is no evidence of adverse events such as cognitive impairment, sedation, constipation or falls related to prescribed medications. There is also no evidence of aberrant behavior like lost prescriptions or early refill requests or multiple prescribers for controlled substances.     Patient  was advised NOT to attempt to drive a motor vehichle or operate any heavy machinery within 6 hrs of taking the presribed medication -     -     temazepam (RESTORIL) 30 MG

## 2019-04-29 ENCOUNTER — OFFICE VISIT (OUTPATIENT)
Dept: INTERNAL MEDICINE | Age: 54
End: 2019-04-29
Payer: COMMERCIAL

## 2019-04-29 VITALS
SYSTOLIC BLOOD PRESSURE: 116 MMHG | HEART RATE: 60 BPM | TEMPERATURE: 98.1 F | OXYGEN SATURATION: 98 % | DIASTOLIC BLOOD PRESSURE: 68 MMHG

## 2019-04-29 DIAGNOSIS — J01.90 ACUTE NON-RECURRENT SINUSITIS, UNSPECIFIED LOCATION: Primary | ICD-10-CM

## 2019-04-29 PROCEDURE — 99213 OFFICE O/P EST LOW 20 MIN: CPT | Performed by: NURSE PRACTITIONER

## 2019-04-29 PROCEDURE — 96372 THER/PROPH/DIAG INJ SC/IM: CPT | Performed by: NURSE PRACTITIONER

## 2019-04-29 RX ORDER — METHYLPREDNISOLONE ACETATE 80 MG/ML
80 INJECTION, SUSPENSION INTRA-ARTICULAR; INTRALESIONAL; INTRAMUSCULAR; SOFT TISSUE ONCE
Status: COMPLETED | OUTPATIENT
Start: 2019-04-29 | End: 2019-04-29

## 2019-04-29 RX ORDER — AMOXICILLIN AND CLAVULANATE POTASSIUM 875; 125 MG/1; MG/1
1 TABLET, FILM COATED ORAL 2 TIMES DAILY
Qty: 20 TABLET | Refills: 0 | Status: SHIPPED | OUTPATIENT
Start: 2019-04-29 | End: 2019-05-20 | Stop reason: CLARIF

## 2019-04-29 RX ADMIN — METHYLPREDNISOLONE ACETATE 80 MG: 80 INJECTION, SUSPENSION INTRA-ARTICULAR; INTRALESIONAL; INTRAMUSCULAR; SOFT TISSUE at 13:49

## 2019-04-29 ASSESSMENT — ENCOUNTER SYMPTOMS
SHORTNESS OF BREATH: 0
COLOR CHANGE: 0
VOICE CHANGE: 0
SINUS PAIN: 1
NAUSEA: 0
PHOTOPHOBIA: 0
RHINORRHEA: 0
VOMITING: 0
BACK PAIN: 0
SINUS PRESSURE: 1
COUGH: 1

## 2019-04-29 NOTE — PROGRESS NOTES
After obtaining consent, and per orders of GRACIE Vidal, an injection of methylPREDNISolone acetate (DEPO-MEDROL) injection 80 mg  was given in Right upper quad. gluteus by Estela Wylie. Patient tolerated well with no immediate adverse reaction. Patient was advised to remain in the exam room for 20 minutes and report any adverse reactions to me immediately.

## 2019-05-06 ENCOUNTER — HOSPITAL ENCOUNTER (OUTPATIENT)
Dept: WOMENS IMAGING | Age: 54
Discharge: HOME | End: 2019-05-06
Payer: COMMERCIAL

## 2019-05-06 DIAGNOSIS — Z12.39 BREAST CANCER SCREENING: ICD-10-CM

## 2019-05-06 LAB — HEREDITARY CANCER TEST-MYRIAD: NORMAL

## 2019-05-06 PROCEDURE — 77063 BREAST TOMOSYNTHESIS BI: CPT

## 2019-05-06 PROCEDURE — 36415 COLL VENOUS BLD VENIPUNCTURE: CPT

## 2019-05-17 DIAGNOSIS — F41.1 GENERALIZED ANXIETY DISORDER: ICD-10-CM

## 2019-05-17 DIAGNOSIS — F51.01 PRIMARY INSOMNIA: ICD-10-CM

## 2019-05-17 DIAGNOSIS — K21.9 GASTROESOPHAGEAL REFLUX DISEASE WITHOUT ESOPHAGITIS: ICD-10-CM

## 2019-05-17 DIAGNOSIS — Z00.00 ANNUAL PHYSICAL EXAM: ICD-10-CM

## 2019-05-17 DIAGNOSIS — M85.89 OSTEOPENIA OF MULTIPLE SITES: ICD-10-CM

## 2019-05-17 LAB
BACTERIA: ABNORMAL /HPF
BILIRUBIN URINE: NEGATIVE
BLOOD, URINE: NEGATIVE
CHOLESTEROL, TOTAL: 214 MG/DL (ref 160–199)
CLARITY: ABNORMAL
COLOR: YELLOW
EPITHELIAL CELLS, UA: 8 /HPF (ref 0–5)
GLUCOSE URINE: NEGATIVE MG/DL
HDLC SERPL-MCNC: 97 MG/DL (ref 65–121)
HYALINE CASTS: 3 /HPF (ref 0–8)
KETONES, URINE: NEGATIVE MG/DL
LDL CHOLESTEROL CALCULATED: 96 MG/DL
LEUKOCYTE ESTERASE, URINE: ABNORMAL
NITRITE, URINE: NEGATIVE
PH UA: 6.5 (ref 5–8)
PROTEIN UA: NEGATIVE MG/DL
RBC UA: 2 /HPF (ref 0–4)
SPECIFIC GRAVITY UA: 1.02 (ref 1–1.03)
TRIGL SERPL-MCNC: 103 MG/DL (ref 0–149)
TSH SERPL DL<=0.05 MIU/L-ACNC: 2.89 UIU/ML (ref 0.27–4.2)
UROBILINOGEN, URINE: 0.2 E.U./DL
VITAMIN D 25-HYDROXY: 27.6 NG/ML
WBC UA: 28 /HPF (ref 0–5)

## 2019-05-20 ENCOUNTER — OFFICE VISIT (OUTPATIENT)
Dept: INTERNAL MEDICINE | Age: 54
End: 2019-05-20
Payer: COMMERCIAL

## 2019-05-20 VITALS
OXYGEN SATURATION: 98 % | HEIGHT: 66 IN | SYSTOLIC BLOOD PRESSURE: 120 MMHG | RESPIRATION RATE: 18 BRPM | BODY MASS INDEX: 21.69 KG/M2 | DIASTOLIC BLOOD PRESSURE: 88 MMHG | WEIGHT: 135 LBS | HEART RATE: 72 BPM

## 2019-05-20 DIAGNOSIS — Z98.890 STATUS POST DILATATION OF ESOPHAGEAL STRICTURE: ICD-10-CM

## 2019-05-20 DIAGNOSIS — Z87.19 STATUS POST DILATATION OF ESOPHAGEAL STRICTURE: ICD-10-CM

## 2019-05-20 DIAGNOSIS — F41.1 GENERALIZED ANXIETY DISORDER: Primary | ICD-10-CM

## 2019-05-20 DIAGNOSIS — K21.9 GASTROESOPHAGEAL REFLUX DISEASE WITHOUT ESOPHAGITIS: ICD-10-CM

## 2019-05-20 DIAGNOSIS — E55.9 VITAMIN D DEFICIENCY: ICD-10-CM

## 2019-05-20 DIAGNOSIS — F51.01 PRIMARY INSOMNIA: ICD-10-CM

## 2019-05-20 DIAGNOSIS — Z00.00 ANNUAL PHYSICAL EXAM: ICD-10-CM

## 2019-05-20 LAB
AMPHETAMINE SCREEN, URINE: NORMAL
BARBITURATE SCREEN, URINE: NORMAL
BENZODIAZEPINE SCREEN, URINE: NORMAL
BUPRENORPHINE URINE: NORMAL
COCAINE METABOLITE SCREEN URINE: NORMAL
GABAPENTIN SCREEN, URINE: NORMAL
MDMA URINE: NORMAL
METHADONE SCREEN, URINE: NORMAL
METHAMPHETAMINE, URINE: NORMAL
OPIATE SCREEN URINE: NORMAL
OXYCODONE SCREEN URINE: NORMAL
PHENCYCLIDINE SCREEN URINE: NORMAL
PROPOXYPHENE SCREEN, URINE: NORMAL
THC SCREEN, URINE: NORMAL
TRICYCLIC ANTIDEPRESSANTS, UR: NORMAL

## 2019-05-20 PROCEDURE — 99396 PREV VISIT EST AGE 40-64: CPT | Performed by: INTERNAL MEDICINE

## 2019-05-20 PROCEDURE — 80305 DRUG TEST PRSMV DIR OPT OBS: CPT | Performed by: INTERNAL MEDICINE

## 2019-05-20 RX ORDER — TEMAZEPAM 30 MG/1
CAPSULE ORAL
Qty: 30 CAPSULE | Refills: 2 | Status: SHIPPED | OUTPATIENT
Start: 2019-05-20 | End: 2019-08-14 | Stop reason: SDUPTHER

## 2019-05-20 RX ORDER — MULTIVIT-MIN/IRON/FOLIC ACID/K 18-600-40
CAPSULE ORAL
COMMUNITY

## 2019-05-20 ASSESSMENT — ENCOUNTER SYMPTOMS
NAUSEA: 0
SORE THROAT: 0
BLOOD IN STOOL: 0
ABDOMINAL PAIN: 0
EYE PAIN: 0
TROUBLE SWALLOWING: 0
CHEST TIGHTNESS: 0
CONSTIPATION: 0
EYE REDNESS: 0
VOMITING: 0
WHEEZING: 0
VOICE CHANGE: 0
SINUS PRESSURE: 0
COUGH: 0
COLOR CHANGE: 0
DIARRHEA: 0

## 2019-05-20 NOTE — LETTER
MEDICATION AGREEMENT     Lu Onofre  1/04/2092      For certain conditions, multiple classes of medications may be used to help better manage your symptoms, and to improve your ability to function at home, work and in social settings. However, these medications do have risks, which will be discussed with you, including addiction and dependency. The following prescribed medications need frequent monitoring and will require you to partner and assist in your healthcare. Medication  Dose, instructions and quantity as indicated on current prescription bottle Diagnosis/Reason(s) for Taking Category     Temazepam   30mg                            Benefits and goals of Controlled Substance Medications: There are two potential goals for your treatment: (1) decreased pain and suffering (2) improved daily life functions. There are many possible treatments for your chronic condition(s), and, in addition to controlled substance medications, we will try alternatives such as physical therapy, yoga, massage, home daily exercise, meditation, relaxation techniques, injections, chiropractic manipulations, surgery, cognitive therapy, hypnosis and many medications that are not habit-forming. Use of controlled substance medications may be helpful, but they are unlikely to resolve all of your symptoms or restore all function. Risks of Controlled Substance Medications:    Opioid pain medications: These medications can lead to problems such as addiction/dependence, sedation, lightheadedness/dizziness, memory issues, falls, constipation, nausea, or vomiting. They may also impair the ability to drive or operate machinery. Additionally, these medications may lower testosterone levels, leading to loss of bone strength, stamina and sex drive.   They may cause problems with breathing, sleep apnea and reduced coughing, which are especially dangerous for patients with lung disease. Overdose or dangerous interactions with alcohol and other medications may occur, leading to death. Hyperalgesia may develop, in which patients receiving opioids for the treatment of pain may actually become more sensitive to certain painful stimuli, and in some cases, experience pain from ordinarily non-painful stimuli. Women between the ages of 14-53 who could become pregnant should carefully weigh the risks and benefits of opioids with their physicians, as these medications increase the risk of pregnancy complications, including miscarriage,  delivery and stillbirth. It is also possible for babies to be born addicted to opioids. Opioid dependence withdrawal symptoms may include; feelings of uneasiness, increased pain, irritability, belly pain, diarrhea, sweats and goose-flesh. Benzodiazepines and non-benzodiazepine sleep medications: These medications can lead to problems such as addiction/dependence, sedation, fatigue, lightheadedness, dizziness, incoordination, falls, depression, hallucinations, and impaired judgment, memory and concentration. The ability to drive and operate machinery may also be affected. Abnormal sleep-related behaviors have been reported, including sleep walking, driving, making telephone calls, eating, or having sex while not fully awake. These medications can suppress breathing and worsen sleep apnea, particularly when combined with alcohol or other sedating medications, potentially leading to death. Dependence withdrawal symptoms may include tremors, anxiety, hallucinations and seizures. Stimulants:  Common adverse effects include addiction/dependence, increased blood pressure and heart rate, decreased appetite, nausea, involuntary weight loss, insomnia, irritability, and headaches.   These risks may increase when these medications are combined with other stimulants, such as caffeine pills or energy drinks, certain weight loss supplements and oral decongestants. Dependence withdrawal symptoms may include depressed mood, loss of interest, suicidal thoughts, anxiety, fatigue, appetite changes and agitation. Testosterone replacement therapy:  Potential side effects include increased risk of stroke and heart attack, blood clots, increased blood pressure, increased cholesterol, enlarged prostate, sleep apnea, irritability/aggression and other mood disorders, and decreased fertility. Other:     1. I understand that I have the following responsibilities:  · I will take medications at the dose and frequency prescribed. · I will not increase or change how I take my medications without the approval of the health care provider who signs this Medication Agreement. · I will arrange for refills at the prescribed interval ONLY during regular office hours. I will not ask for refills earlier than agreed, after-hours, on holidays or on weekends. · I will obtain all refills for these medications at  ·  ____________________________________  pharmacy (phone number  ·  ________________________), with full consent for my provider and pharmacist to exchange information in writing or verbally. · I will not request any pain medications or controlled substances from other providers and will inform this provider of all other medications I am taking. · I will inform my other health care providers that I am taking these medications and of the existence of this [facility]. In the event of an emergency, I will provide the same information to the emergency department providers. · I will protect my prescriptions and medications. I understand that lost or misplaced prescriptions will not be replaced. · I will keep medications only for my own use and will not share them with others. I will keep all medications away from children. · I agree to participate in any medical, psychological or psychiatric assessments recommended by my provider. · I will actively participate in any program designed to improve function, including social, physical, psychological and daily or work activities. 2. I will not use illegal or street drugs or another person's prescription. If I have an addiction problem with drugs or alcohol and my provider asks me to enter a program to address this issue, I agree to follow through. Such programs may include:  · 12-Step program and securing a sponsor  · Individual counseling   · Inpatient or outpatient treatment  · Other:_____________________________________________________________________________________________________________________________________________    If in treatment, I will request that a copy of the programs initial evaluation and treatment recommendations be sent to this provider and will not expect refills until that is received. I will also request written monthly updates be sent to this provider to verify my continuing treatment. 3. I will consent to drug screening upon my providers request to assure I am only taking the prescribed drugs, described in this MEDICATION AGREEMENT. I understand that a drug screen is a laboratory test in which a sample of my urine, blood or saliva is checked to see what drugs I have been taking. 4. I agree that I will treat the providers and staff at this office with respect at all times. I will keep all of my scheduled appointments, but if I need to cancel my appointment, I will do so a minimum of 24 hours before it is scheduled. 5. I understand that this provider may stop prescribing the medications listed if:  · I do not show any improvement in pain, or my activity has not improved. · I develop rapid tolerance or loss of improvement, as described in my treatment plan. · I develop significant side effects from the medication.   · My behavior is inconsistent with the responsibilities outlined above, which may also result in my being prevented from receiving further care from this office. · Other:____________________________________________________________________    AGREEMENT:    I have read the above and have had all of my questions answered. For chronic disease management, I know that my symptoms can be managed with many types of treatments. A chronic medication trial may be part of my treatment, but I must be an active participant in my care. Medication therapy is only one part of my symptom management plan. In some cases, there may be limited scientific evidence to support the chronic use of certain medications to improve symptoms and daily function. Furthermore, in certain circumstances, there may be scientific information that suggests that use of chronic controlled substances may actually worsen my symptoms and increase my risk of unintentional death directly related to this medication therapy. I know that if my provider feels my risk from controlled medications is greater than my benefit, I will have my controlled substance medication(s) compassionately lowered or removed altogether. I agree to a controlled substance medication trial.      I further agree to allow this office to contact my HIPAA contact on file if there are concerns about my safety and use of controlled medications. I have agreed to use the following medications above as instructed by my physician and as stated in this [facility] [address].      Patient Signature:  ______________________  Date:5/20/2019 or _____________    Provider Signature:______________________  Date:5/20/2019 or _____________

## 2019-05-20 NOTE — PROGRESS NOTES
Take 1 tablet by mouth daily 90 tablet 3    pantoprazole (PROTONIX) 40 MG tablet Take 1 tablet by mouth daily 30 tablet 3    topiramate (TOPAMAX) 25 MG tablet Take 25 mg by mouth 2 times daily      Probiotic Product (ALIGN PO) Take by mouth      Black Cohosh 540 MG CAPS Take 540 mg by mouth      fluticasone (FLONASE) 50 MCG/ACT nasal spray 2 sprays      estradiol (ESTRACE) 2 MG tablet Take 2 mg by mouth daily      polyethylene glycol (GLYCOLAX) powder Take 1 g by mouth as needed       No current facility-administered medications for this visit.       Allergies   Allergen Reactions    Iohexol Rash     This is IV Contrast    Sulfa Antibiotics Rash       Social History     Socioeconomic History    Marital status:      Spouse name: None    Number of children: 3    Years of education: None    Highest education level: None   Occupational History    Occupation: Retired    Social Needs    Financial resource strain: None    Food insecurity:     Worry: None     Inability: None    Transportation needs:     Medical: None     Non-medical: None   Tobacco Use    Smoking status: Never Smoker    Smokeless tobacco: Never Used   Substance and Sexual Activity    Alcohol use: No    Drug use: No    Sexual activity: None   Lifestyle    Physical activity:     Days per week: None     Minutes per session: None    Stress: None   Relationships    Social connections:     Talks on phone: None     Gets together: None     Attends Christianity service: None     Active member of club or organization: None     Attends meetings of clubs or organizations: None     Relationship status: None    Intimate partner violence:     Fear of current or ex partner: None     Emotionally abused: None     Physically abused: None     Forced sexual activity: None   Other Topics Concern    None   Social History Narrative    He was  but  recently remarried and moved to [place] 11/2017, she has 3 adult children ( 2 adopted) Family History   Problem Relation Age of Onset    Cancer Father         malt    Parkinsonism Father     Cancer Mother         lung          Past Surgical History:   Procedure Laterality Date    BLADDER SURGERY  2013    vaginal tape    COLONOSCOPY      one polyp    ESOPHAGEAL DILATATION  2017 and 3/2019    HYSTERECTOMY      one ovary left    SINUS SURGERY      TONSILLECTOMY      WISDOM TOOTH EXTRACTION           Lab Review   Orders Only on 05/17/2019   Component Date Value    Vit D, 25-Hydroxy 05/17/2019 27.6*    TSH 05/17/2019 2.890     Color, UA 05/17/2019 YELLOW     Clarity, UA 05/17/2019 CLOUDY*    Glucose, Ur 05/17/2019 Negative     Bilirubin Urine 05/17/2019 Negative     Ketones, Urine 05/17/2019 Negative     Specific Gravity, UA 05/17/2019 1.017     Blood, Urine 05/17/2019 Negative     pH, UA 05/17/2019 6.5     Protein, UA 05/17/2019 Negative     Urobilinogen, Urine 05/17/2019 0.2     Nitrite, Urine 05/17/2019 Negative     Leukocyte Esterase, Urine 05/17/2019 LARGE*    Cholesterol, Total 05/17/2019 214*    Triglycerides 05/17/2019 103     HDL 05/17/2019 97     LDL Calculated 05/17/2019 96     Bacteria, UA 05/17/2019 TRACE     Hyaline Casts, UA 05/17/2019 3     WBC, UA 05/17/2019 28*    RBC, UA 05/17/2019 2     Epi Cells 05/17/2019 8    Hospital Outpatient Visit on 05/06/2019   Component Date Value    Hereditary Cancer Test-M* 05/06/2019 see scan doc          Review of Systems   Constitutional: Negative for chills, fatigue and fever. HENT: Negative for congestion, ear pain, postnasal drip, sinus pressure, sore throat, trouble swallowing and voice change. Eyes: Negative for pain, redness and visual disturbance. Respiratory: Negative for cough, chest tightness and wheezing. Cardiovascular: Negative for chest pain, palpitations and leg swelling. Gastrointestinal: Negative for abdominal pain, blood in stool, constipation, diarrhea, nausea and vomiting.    Endocrine: Negative for polydipsia and polyuria. Genitourinary: Negative for dysuria, enuresis, flank pain, frequency and urgency. Musculoskeletal: Negative for arthralgias, gait problem and joint swelling. Skin: Negative for color change and rash. Neurological: Negative for dizziness, tremors, syncope, facial asymmetry, speech difficulty, weakness, numbness and headaches. Psychiatric/Behavioral: Negative for agitation, behavioral problems, confusion, sleep disturbance and suicidal ideas. The patient is not nervous/anxious. Vitals:    05/20/19 1155   BP: 120/88   Site: Left Upper Arm   Position: Sitting   Cuff Size: Large Adult   Pulse: 72   Resp: 18   SpO2: 98%   Weight: 135 lb (61.2 kg)   Height: 5' 6\" (1.676 m)      Wt Readings from Last 3 Encounters:   05/20/19 135 lb (61.2 kg)   04/22/19 140 lb (63.5 kg)   03/25/19 139 lb (63 kg)   Body mass index is 21.79 kg/m². BP Readings from Last 3 Encounters:   05/20/19 120/88   04/29/19 116/68   04/22/19 100/68       Physical Exam   Constitutional: She is oriented to person, place, and time. She appears well-developed and well-nourished. HENT:   Head: Normocephalic and atraumatic. Right Ear: External ear normal.   Left Ear: External ear normal.   Mouth/Throat: Oropharynx is clear and moist.   Eyes: Pupils are equal, round, and reactive to light. Conjunctivae are normal. No scleral icterus. Neck: Normal range of motion. Neck supple. No JVD present. No thyromegaly present. Cardiovascular: Normal rate, regular rhythm and normal heart sounds. No murmur heard. Pulmonary/Chest: Effort normal and breath sounds normal. No respiratory distress. She has no wheezes. She exhibits no tenderness. Abdominal: Soft. Bowel sounds are normal. She exhibits no mass. There is no tenderness. Musculoskeletal: Normal range of motion. She exhibits no edema or tenderness. Lymphadenopathy:     She has no cervical adenopathy.    Neurological: She is alert and oriented to his GI physician for further opinion and recommendations/medication changes recommendations      Orders Placed This Encounter   Procedures    Vitamin D 25 Hydroxy    POCT Rapid Drug Screen     New Prescriptions    No medications on file      There are no Patient Instructions on file for this visit. Return in about 3 months (around 8/20/2019) for Medication check. EMR Dragon/transcription disclaimer:Significant part of this  encounter note is electronic transcription/translation of spoken language to printed text. The electronic translation of spoken language may beerroneous, or at times, nonsensical words or phrases may be inadvertently transcribed.  Although I have reviewed the note for such errors, some may still exist.

## 2019-08-12 DIAGNOSIS — E55.9 VITAMIN D DEFICIENCY: ICD-10-CM

## 2019-08-12 LAB — VITAMIN D 25-HYDROXY: 55.3 NG/ML

## 2019-08-14 ENCOUNTER — OFFICE VISIT (OUTPATIENT)
Dept: INTERNAL MEDICINE | Age: 54
End: 2019-08-14
Payer: COMMERCIAL

## 2019-08-14 VITALS
SYSTOLIC BLOOD PRESSURE: 104 MMHG | OXYGEN SATURATION: 98 % | HEIGHT: 66 IN | HEART RATE: 88 BPM | WEIGHT: 137 LBS | DIASTOLIC BLOOD PRESSURE: 64 MMHG | BODY MASS INDEX: 22.02 KG/M2

## 2019-08-14 DIAGNOSIS — F51.01 PRIMARY INSOMNIA: ICD-10-CM

## 2019-08-14 PROCEDURE — 99214 OFFICE O/P EST MOD 30 MIN: CPT | Performed by: INTERNAL MEDICINE

## 2019-08-14 RX ORDER — DESVENLAFAXINE 50 MG/1
50 TABLET, EXTENDED RELEASE ORAL DAILY
Qty: 30 TABLET | Refills: 3 | Status: SHIPPED | OUTPATIENT
Start: 2019-08-14 | End: 2019-11-14 | Stop reason: SDUPTHER

## 2019-08-14 RX ORDER — TEMAZEPAM 30 MG/1
CAPSULE ORAL
Qty: 30 CAPSULE | Refills: 2 | Status: SHIPPED | OUTPATIENT
Start: 2019-08-14 | End: 2019-11-14 | Stop reason: SDUPTHER

## 2019-08-14 ASSESSMENT — ENCOUNTER SYMPTOMS
WHEEZING: 0
CONSTIPATION: 0
ABDOMINAL PAIN: 0
COUGH: 0
SORE THROAT: 0
CHEST TIGHTNESS: 0

## 2019-08-14 NOTE — PROGRESS NOTES
SpO2: 98%   Weight: 137 lb (62.1 kg)   Height: 5' 6\" (1.676 m)     Body mass index is 22.11 kg/m². Physical Exam   Constitutional: She is oriented to person, place, and time. She appears well-developed and well-nourished. HENT:   Right Ear: External ear normal.   Left Ear: External ear normal.   Mouth/Throat: Oropharynx is clear and moist. No oropharyngeal exudate. Eyes: Pupils are equal, round, and reactive to light. Conjunctivae are normal.   Neck: Neck supple. No JVD present. No thyromegaly present. Cardiovascular: Normal rate and normal heart sounds. No murmur heard. Pulmonary/Chest: Breath sounds normal. No respiratory distress. She has no wheezes. She has no rales. She exhibits no tenderness. Abdominal: Soft. Bowel sounds are normal.   Musculoskeletal: Normal range of motion. Lymphadenopathy:     She has no cervical adenopathy. Neurological: She is oriented to person, place, and time. Skin: Skin is warm. No rash noted. Lab Review   Orders Only on 08/12/2019   Component Date Value    Vit D, 25-Hydroxy 08/12/2019 55.3            ASSESSMENT/PLAN:  Generalized anxiety disorder- refill Pristiq 50 , seems to be doing well      Vit D level now better 55 ( 27)  ++ Osteopenia       Osteopenia of multiple sites 07/17/2018     3/2018  * lumbar spine average T score is -1.45, she does have T score of -2.2 at L4  * Left hip T score -1.8, neck -1.9,troch -2.1  * Right hip total -1.6, neck -1.9,troch -1.8  * Radius total -1.5         Vit D - cont 2000 iu daily  BD plan 3/2020        Menopause ovarian failure- patient is taking estrogen replacement as per her previous PCP.  Our goal for the future would be trying to decrease Estrace 2 mg dose to lower dose   She will DW her GYN     Primary insomnia, refill temazepam she takes 30 mg every evening and has been doing this for years  Wyatt Avelar reviewed  Wyatt Avelar was reviewed  On exam today and as per discussions with the patient today there is no evidence of adverse events such as cognitive impairment, sedation, constipation or falls related to prescribed medications. There is also no evidence of aberrant behavior like lost prescriptions or early refill requests or multiple prescribers for controlled substances.     Patient  was advised NOT to attempt to drive a motor vehichle or operate any heavy machinery within 6 hrs of taking the presribed medication - temazepam   Ricky 05/20/2019  Med Contract 05/20/2019  UDS 68/16/3775   Med Policy 05/67/5282    Gastroesophageal reflux disease without esophagitis-  post recent is esophageal dilatation by gastroenterology  She will cont PPI Protonix but she will try to decrease it to 40 mg once daily from twice daily dosing  Twice daily dosing has been controlling her symptoms  Last appointment patient preferred not to see GI for repeat EGD  I suggest today that she decrease his Protonix to 40 mg once daily in the evening and add Pepcid 40 mg daily  If symptoms recur she would need to notify us        No orders of the defined types were placed in this encounter. New Prescriptions    No medications on file         Return in about 3 months (around 11/14/2019) for Medication check. There are no Patient Instructions on file for this visit. EMR Dragon/transcription disclaimer:Significant part of this  encounter note is electronic transcription/translationof spoken language to printed text. The electronic translation of spoken language may be erroneous, or at times, nonsensical words or phrases may be inadvertently transcribed.  Although I have reviewed the note for sucherrors, some may still exist.

## 2019-08-29 ENCOUNTER — E-VISIT (OUTPATIENT)
Dept: INTERNAL MEDICINE | Age: 54
End: 2019-08-29
Payer: COMMERCIAL

## 2019-08-29 DIAGNOSIS — Z20.828 EXPOSURE TO INFLUENZA: ICD-10-CM

## 2019-08-29 PROCEDURE — 99444 PR PHYSICIAN ONLINE EVALUATION & MANAGEMENT SERVICE: CPT | Performed by: INTERNAL MEDICINE

## 2019-08-29 RX ORDER — OSELTAMIVIR PHOSPHATE 75 MG/1
75 CAPSULE ORAL DAILY
Qty: 10 CAPSULE | Refills: 0 | Status: SHIPPED | OUTPATIENT
Start: 2019-08-29 | End: 2019-09-08

## 2019-08-29 ASSESSMENT — LIFESTYLE VARIABLES: SMOKING_STATUS: NO, I HAVE NEVER SMOKED

## 2019-09-11 ENCOUNTER — OFFICE VISIT (OUTPATIENT)
Dept: INTERNAL MEDICINE | Age: 54
End: 2019-09-11
Payer: COMMERCIAL

## 2019-09-11 VITALS
HEART RATE: 78 BPM | DIASTOLIC BLOOD PRESSURE: 78 MMHG | RESPIRATION RATE: 18 BRPM | OXYGEN SATURATION: 98 % | BODY MASS INDEX: 22.5 KG/M2 | WEIGHT: 140 LBS | SYSTOLIC BLOOD PRESSURE: 102 MMHG | HEIGHT: 66 IN

## 2019-09-11 DIAGNOSIS — L03.011 INFECTED NAILBED OF FINGER, RIGHT: Primary | ICD-10-CM

## 2019-09-11 DIAGNOSIS — Z23 NEED FOR IMMUNIZATION AGAINST INFLUENZA: ICD-10-CM

## 2019-09-11 PROCEDURE — 90686 IIV4 VACC NO PRSV 0.5 ML IM: CPT | Performed by: INTERNAL MEDICINE

## 2019-09-11 PROCEDURE — 90471 IMMUNIZATION ADMIN: CPT | Performed by: INTERNAL MEDICINE

## 2019-09-11 PROCEDURE — 99213 OFFICE O/P EST LOW 20 MIN: CPT | Performed by: INTERNAL MEDICINE

## 2019-09-11 ASSESSMENT — ENCOUNTER SYMPTOMS
WHEEZING: 0
CHEST TIGHTNESS: 0
CONSTIPATION: 0
ABDOMINAL PAIN: 0
SORE THROAT: 0
COUGH: 0

## 2019-09-19 ENCOUNTER — TELEPHONE (OUTPATIENT)
Dept: INTERNAL MEDICINE | Age: 54
End: 2019-09-19

## 2019-09-19 ENCOUNTER — OFFICE VISIT (OUTPATIENT)
Dept: INTERNAL MEDICINE | Age: 54
End: 2019-09-19
Payer: COMMERCIAL

## 2019-09-19 VITALS
OXYGEN SATURATION: 97 % | SYSTOLIC BLOOD PRESSURE: 122 MMHG | HEIGHT: 66 IN | TEMPERATURE: 97.6 F | BODY MASS INDEX: 22.82 KG/M2 | HEART RATE: 78 BPM | DIASTOLIC BLOOD PRESSURE: 80 MMHG | WEIGHT: 142 LBS

## 2019-09-19 DIAGNOSIS — B37.0 THRUSH OF MOUTH AND ESOPHAGUS (HCC): Primary | ICD-10-CM

## 2019-09-19 DIAGNOSIS — B37.81 THRUSH OF MOUTH AND ESOPHAGUS (HCC): Primary | ICD-10-CM

## 2019-09-19 DIAGNOSIS — R07.9 CHEST PAIN, UNSPECIFIED TYPE: ICD-10-CM

## 2019-09-19 DIAGNOSIS — K13.79 PAIN IN ORAL CAVITY: ICD-10-CM

## 2019-09-19 DIAGNOSIS — K21.9 GASTROESOPHAGEAL REFLUX DISEASE WITHOUT ESOPHAGITIS: ICD-10-CM

## 2019-09-19 PROCEDURE — 93000 ELECTROCARDIOGRAM COMPLETE: CPT | Performed by: PHYSICIAN ASSISTANT

## 2019-09-19 PROCEDURE — 99213 OFFICE O/P EST LOW 20 MIN: CPT | Performed by: PHYSICIAN ASSISTANT

## 2019-09-19 RX ORDER — FLUCONAZOLE 100 MG/1
100 TABLET ORAL DAILY
COMMUNITY
End: 2019-12-04 | Stop reason: ALTCHOICE

## 2019-09-19 ASSESSMENT — ENCOUNTER SYMPTOMS
RHINORRHEA: 0
VOMITING: 0
CHEST TIGHTNESS: 0
WHEEZING: 0
EYE PAIN: 0
SHORTNESS OF BREATH: 0
SORE THROAT: 0
COLOR CHANGE: 0
SINUS PRESSURE: 0
PHOTOPHOBIA: 0
CONSTIPATION: 0
COUGH: 0
ABDOMINAL PAIN: 0
EYE REDNESS: 0
NAUSEA: 0
TROUBLE SWALLOWING: 1
DIARRHEA: 0
VOICE CHANGE: 1

## 2019-09-19 NOTE — PROGRESS NOTES
Osteopenia 07/17/2018    Anxiety 02/28/2019     Past Medical History:   Diagnosis Date    Allergic rhinitis     Irritable bowel syndrome     Irritable bowel syndrome (IBS)     Sinus infection        Past Medical History:   Diagnosis Date    Allergic rhinitis     Anxiety     Irritable bowel syndrome     Irritable bowel syndrome (IBS)     Sinus infection        Prior to Visit Medications    Medication Sig Taking? Authorizing Provider   fluconazole (DIFLUCAN) 100 MG tablet Take 100 mg by mouth daily Yes Historical Provider, MD   nystatin (MYCOSTATIN) 333334 UNIT/ML suspension Take 5 mLs by mouth 4 times daily for 10 days Retain in mouth as long as possible Yes ROSIO Montelongo   desvenlafaxine succinate (PRISTIQ) 50 MG TB24 extended release tablet Take 1 tablet by mouth daily Yes Irma Bourne MD   Cholecalciferol (VITAMIN D) 2000 units CAPS capsule Take by mouth Yes Historical Provider, MD   cetirizine (ZYRTEC) 10 MG tablet Take 1 tablet by mouth daily Yes GRACIE Salazar   pantoprazole (PROTONIX) 40 MG tablet Take 1 tablet by mouth daily Yes GRACIE Salazar   topiramate (TOPAMAX) 25 MG tablet Take 25 mg by mouth 2 times daily Yes Historical Provider, MD   Probiotic Product (ALIGN PO) Take by mouth Yes Historical Provider, MD   Black Cohosh 540 MG CAPS Take 540 mg by mouth Yes Historical Provider, MD   fluticasone (FLONASE) 50 MCG/ACT nasal spray 2 sprays Yes Historical Provider, MD   estradiol (ESTRACE) 2 MG tablet Take 2 mg by mouth daily Yes Historical Provider, MD   polyethylene glycol (GLYCOLAX) powder Take 1 g by mouth as needed Yes Historical Provider, MD   temazepam (RESTORIL) 30 MG capsule Take 1 capsule by mouth nightly as needed for anxiety or sleep.   Irma Bourne MD       Past Surgical History:   Procedure Laterality Date    BLADDER SURGERY  2013    vaginal tape    COLONOSCOPY      one polyp    ESOPHAGEAL DILATATION  2017 and 3/2019    HYSTERECTOMY      one ovary left    SINUS SURGERY      TONSILLECTOMY      WISDOM TOOTH EXTRACTION         Family History   Problem Relation Age of Onset    Cancer Father         malt    Parkinsonism Father     Cancer Mother         lung       Allergies   Allergen Reactions    Iohexol Rash     This is IV Contrast    Sulfa Antibiotics Rash       Social History     Socioeconomic History    Marital status:      Spouse name: Not on file    Number of children: 3    Years of education: Not on file    Highest education level: Not on file   Occupational History    Occupation: Retired    Social Needs    Financial resource strain: Not on file    Food insecurity:     Worry: Not on file     Inability: Not on file   [employer] needs:     Medical: Not on file     Non-medical: Not on file   Tobacco Use    Smoking status: Never Smoker    Smokeless tobacco: Never Used   Substance and Sexual Activity    Alcohol use: No    Drug use: No    Sexual activity: Not on file   Lifestyle    Physical activity:     Days per week: Not on file     Minutes per session: Not on file    Stress: Not on file   Relationships    Social connections:     Talks on phone: Not on file     Gets together: Not on file     Attends Hoahaoism service: Not on file     Active member of club or organization: Not on file     Attends meetings of clubs or organizations: Not on file     Relationship status: Not on file    Intimate partner violence:     Fear of current or ex partner: Not on file     Emotionally abused: Not on file     Physically abused: Not on file     Forced sexual activity: Not on file   Other Topics Concern    Not on file   Social History Narrative    He was  but  recently remarried and moved to [place] 11/2017, she has 3 adult children ( 2 adopted)       Review of Systems  Review of Systems   Constitutional: Negative for activity change, appetite change, chills, fatigue and fever.    HENT: Positive for mouth sores, trouble swallowing and 7/48 degrees. Normal sinus rhythm no significant ST elevations or depressions no significant Q waves noted. Unremarkable EKG. ASSESSMENT      ICD-10-CM    1. Thrush of mouth and esophagus (HCC) B37.81 fluconazole (DIFLUCAN) 100 MG tablet    B37.0 nystatin (MYCOSTATIN) 361408 UNIT/ML suspension   2. Chest pain, unspecified type R07.9 EKG 12 lead     EKG 12 lead   3. Gastroesophageal reflux disease without esophagitis K21.9    4. Pain in oral cavity K13.79        PLAN  Patient could use the nystatin 5 mL 4 times a day for the next 10 days. Patient will need to use the nystatin to get down into the esophagus. Patient states is going to continue the Diflucan also. Patient still having some burning pain in her esophagus. Patient going to go down to Protonix 40 mg daily along with the Pepcid to see if this would help because she was taken to Protonix and it did not seem to help. If patient does not improve after the treatment for the thrush she probably needs to get in to gastroenterology for possible scope. Patient's EKG was unremarkable. Patient follow-up as needed if not improving. Orders Placed This Encounter   Procedures    EKG 12 lead        Return if symptoms worsen or fail to improve. All questions answered. Patient voices understanding and agrees to plans along with risks and benefits of plan. Patient is instructed to continue prior medications, diet, and exercise plans as instructed. Patient agrees to follow up as instructions, sooner if needed, or to go to ER if condition becomes emergent. Additional Instructions: As always, patient is advised to bring in medication bottles in order to correctly reconcile with our current list.      Madeline Deleon PA-C    EMR Dragon/transcription disclaimer: Much of this encounter note is electronic transcription/translation of spoken language to printed text.  The electronictranslation of spoken language may be erroneous, or at times,

## 2019-10-09 ENCOUNTER — NURSE ONLY (OUTPATIENT)
Dept: INTERNAL MEDICINE | Age: 54
End: 2019-10-09
Payer: COMMERCIAL

## 2019-10-09 DIAGNOSIS — Z23 NEED FOR VACCINATION: Primary | ICD-10-CM

## 2019-10-09 PROCEDURE — 90750 HZV VACC RECOMBINANT IM: CPT | Performed by: INTERNAL MEDICINE

## 2019-10-09 PROCEDURE — 90471 IMMUNIZATION ADMIN: CPT | Performed by: INTERNAL MEDICINE

## 2019-10-10 ENCOUNTER — TELEPHONE (OUTPATIENT)
Dept: INTERNAL MEDICINE | Age: 54
End: 2019-10-10

## 2019-10-10 RX ORDER — OMEPRAZOLE 20 MG/1
20 CAPSULE, DELAYED RELEASE ORAL 2 TIMES DAILY
Qty: 30 CAPSULE | Refills: 0 | Status: CANCELLED | OUTPATIENT
Start: 2019-10-10

## 2019-10-10 RX ORDER — SUCRALFATE 1 G/1
1 TABLET ORAL 4 TIMES DAILY
Qty: 120 TABLET | Refills: 0 | Status: SHIPPED | OUTPATIENT
Start: 2019-10-10 | End: 2019-12-04 | Stop reason: ALTCHOICE

## 2019-11-14 ENCOUNTER — OFFICE VISIT (OUTPATIENT)
Dept: INTERNAL MEDICINE | Age: 54
End: 2019-11-14
Payer: COMMERCIAL

## 2019-11-14 VITALS
BODY MASS INDEX: 21.53 KG/M2 | OXYGEN SATURATION: 99 % | HEART RATE: 89 BPM | HEIGHT: 66 IN | RESPIRATION RATE: 18 BRPM | SYSTOLIC BLOOD PRESSURE: 118 MMHG | DIASTOLIC BLOOD PRESSURE: 70 MMHG | WEIGHT: 134 LBS

## 2019-11-14 DIAGNOSIS — Z11.4 SCREENING FOR HIV (HUMAN IMMUNODEFICIENCY VIRUS): ICD-10-CM

## 2019-11-14 DIAGNOSIS — F51.01 PRIMARY INSOMNIA: Primary | ICD-10-CM

## 2019-11-14 DIAGNOSIS — E55.9 VITAMIN D DEFICIENCY: ICD-10-CM

## 2019-11-14 DIAGNOSIS — Z00.00 ANNUAL PHYSICAL EXAM: ICD-10-CM

## 2019-11-14 DIAGNOSIS — M85.89 OSTEOPENIA OF MULTIPLE SITES: ICD-10-CM

## 2019-11-14 DIAGNOSIS — Z11.59 NEED FOR HEPATITIS C SCREENING TEST: ICD-10-CM

## 2019-11-14 DIAGNOSIS — K21.9 GASTROESOPHAGEAL REFLUX DISEASE WITHOUT ESOPHAGITIS: ICD-10-CM

## 2019-11-14 PROCEDURE — 99214 OFFICE O/P EST MOD 30 MIN: CPT | Performed by: INTERNAL MEDICINE

## 2019-11-14 RX ORDER — PANTOPRAZOLE SODIUM 40 MG/1
40 TABLET, DELAYED RELEASE ORAL 2 TIMES DAILY
Qty: 60 TABLET | Refills: 3 | Status: SHIPPED
Start: 2019-11-14 | End: 2020-02-18 | Stop reason: CLARIF

## 2019-11-14 RX ORDER — DESVENLAFAXINE 50 MG/1
50 TABLET, EXTENDED RELEASE ORAL DAILY
Qty: 30 TABLET | Refills: 3 | Status: SHIPPED | OUTPATIENT
Start: 2019-11-14 | End: 2020-02-18 | Stop reason: SDUPTHER

## 2019-11-14 RX ORDER — TEMAZEPAM 30 MG/1
CAPSULE ORAL
Qty: 30 CAPSULE | Refills: 2 | Status: SHIPPED | OUTPATIENT
Start: 2019-11-14 | End: 2020-02-18 | Stop reason: SDUPTHER

## 2019-11-14 ASSESSMENT — ENCOUNTER SYMPTOMS
CHEST TIGHTNESS: 0
ABDOMINAL PAIN: 0
SORE THROAT: 0
WHEEZING: 0
COUGH: 0
CONSTIPATION: 0

## 2019-12-04 ENCOUNTER — OFFICE VISIT (OUTPATIENT)
Dept: GASTROENTEROLOGY | Age: 54
End: 2019-12-04
Payer: COMMERCIAL

## 2019-12-04 VITALS
WEIGHT: 132 LBS | SYSTOLIC BLOOD PRESSURE: 117 MMHG | DIASTOLIC BLOOD PRESSURE: 75 MMHG | HEIGHT: 66 IN | HEART RATE: 106 BPM | BODY MASS INDEX: 21.21 KG/M2 | OXYGEN SATURATION: 98 %

## 2019-12-04 DIAGNOSIS — R14.0 BLOATING: ICD-10-CM

## 2019-12-04 DIAGNOSIS — R12 HEARTBURN: ICD-10-CM

## 2019-12-04 DIAGNOSIS — K59.09 CHRONIC CONSTIPATION: ICD-10-CM

## 2019-12-04 DIAGNOSIS — Z86.010 HX OF ADENOMATOUS COLONIC POLYPS: ICD-10-CM

## 2019-12-04 DIAGNOSIS — R10.13 EPIGASTRIC PAIN: ICD-10-CM

## 2019-12-04 DIAGNOSIS — R49.0 HOARSENESS OF VOICE: ICD-10-CM

## 2019-12-04 DIAGNOSIS — K21.9 GASTROESOPHAGEAL REFLUX DISEASE, ESOPHAGITIS PRESENCE NOT SPECIFIED: Primary | ICD-10-CM

## 2019-12-04 DIAGNOSIS — Z98.890 HISTORY OF ESOPHAGEAL DILATATION: ICD-10-CM

## 2019-12-04 DIAGNOSIS — R14.2 BELCHING: ICD-10-CM

## 2019-12-04 PROCEDURE — 99204 OFFICE O/P NEW MOD 45 MIN: CPT | Performed by: INTERNAL MEDICINE

## 2019-12-04 ASSESSMENT — ENCOUNTER SYMPTOMS
CONSTIPATION: 1
DIARRHEA: 0
EYE REDNESS: 0
RHINORRHEA: 0
BLOOD IN STOOL: 0
SORE THROAT: 1
ALLERGIC/IMMUNOLOGIC NEGATIVE: 1
ANAL BLEEDING: 0
EYE DISCHARGE: 0
CHOKING: 0
RESPIRATORY NEGATIVE: 1
TROUBLE SWALLOWING: 0
COUGH: 0
WHEEZING: 0
EYES NEGATIVE: 1
ABDOMINAL DISTENTION: 1
SHORTNESS OF BREATH: 0
ABDOMINAL PAIN: 1
SINUS PRESSURE: 0

## 2019-12-16 ENCOUNTER — OFFICE VISIT (OUTPATIENT)
Dept: URGENT CARE | Age: 54
End: 2019-12-16
Payer: COMMERCIAL

## 2019-12-16 VITALS
WEIGHT: 136 LBS | BODY MASS INDEX: 21.86 KG/M2 | SYSTOLIC BLOOD PRESSURE: 115 MMHG | OXYGEN SATURATION: 100 % | RESPIRATION RATE: 18 BRPM | HEART RATE: 101 BPM | TEMPERATURE: 98.7 F | DIASTOLIC BLOOD PRESSURE: 79 MMHG | HEIGHT: 66 IN

## 2019-12-16 DIAGNOSIS — J02.9 SORE THROAT: Primary | ICD-10-CM

## 2019-12-16 DIAGNOSIS — R52 BODY ACHES: ICD-10-CM

## 2019-12-16 DIAGNOSIS — B96.89 SINUSITIS, BACTERIAL: ICD-10-CM

## 2019-12-16 DIAGNOSIS — J02.0 STREP PHARYNGITIS: ICD-10-CM

## 2019-12-16 DIAGNOSIS — J32.9 SINUSITIS, BACTERIAL: ICD-10-CM

## 2019-12-16 LAB
INFLUENZA A ANTIBODY: NEGATIVE
INFLUENZA B ANTIBODY: NEGATIVE
S PYO AG THROAT QL: POSITIVE

## 2019-12-16 PROCEDURE — 87880 STREP A ASSAY W/OPTIC: CPT | Performed by: NURSE PRACTITIONER

## 2019-12-16 PROCEDURE — 96372 THER/PROPH/DIAG INJ SC/IM: CPT | Performed by: NURSE PRACTITIONER

## 2019-12-16 PROCEDURE — 99213 OFFICE O/P EST LOW 20 MIN: CPT | Performed by: NURSE PRACTITIONER

## 2019-12-16 PROCEDURE — 87804 INFLUENZA ASSAY W/OPTIC: CPT | Performed by: NURSE PRACTITIONER

## 2019-12-16 RX ORDER — METHYLPREDNISOLONE 4 MG/1
TABLET ORAL
Qty: 1 KIT | Refills: 0 | Status: SHIPPED | OUTPATIENT
Start: 2019-12-16 | End: 2019-12-22

## 2019-12-16 RX ORDER — AMOXICILLIN AND CLAVULANATE POTASSIUM 875; 125 MG/1; MG/1
1 TABLET, FILM COATED ORAL 2 TIMES DAILY
Qty: 20 TABLET | Refills: 0 | Status: SHIPPED | OUTPATIENT
Start: 2019-12-16 | End: 2019-12-26

## 2019-12-16 RX ORDER — DEXAMETHASONE SODIUM PHOSPHATE 100 MG/10ML
5 INJECTION INTRAMUSCULAR; INTRAVENOUS ONCE
Status: COMPLETED | OUTPATIENT
Start: 2019-12-16 | End: 2019-12-16

## 2019-12-16 RX ADMIN — DEXAMETHASONE SODIUM PHOSPHATE 5 MG: 100 INJECTION INTRAMUSCULAR; INTRAVENOUS at 09:43

## 2019-12-16 ASSESSMENT — ENCOUNTER SYMPTOMS
ABDOMINAL PAIN: 0
EYES NEGATIVE: 1
SINUS PRESSURE: 1
RESPIRATORY NEGATIVE: 1
NAUSEA: 0
SINUS PAIN: 1
VOMITING: 0
WHEEZING: 0
VOICE CHANGE: 0
RHINORRHEA: 0
TROUBLE SWALLOWING: 1
COUGH: 0
SORE THROAT: 1
ALLERGIC/IMMUNOLOGIC NEGATIVE: 1

## 2020-01-27 ENCOUNTER — ANESTHESIA EVENT (OUTPATIENT)
Dept: ENDOSCOPY | Age: 55
End: 2020-01-27
Payer: COMMERCIAL

## 2020-01-27 ENCOUNTER — HOSPITAL ENCOUNTER (OUTPATIENT)
Age: 55
Setting detail: OUTPATIENT SURGERY
Discharge: HOME | End: 2020-01-27
Attending: INTERNAL MEDICINE | Admitting: INTERNAL MEDICINE
Payer: COMMERCIAL

## 2020-01-27 ENCOUNTER — ANESTHESIA (OUTPATIENT)
Dept: ENDOSCOPY | Age: 55
End: 2020-01-27
Payer: COMMERCIAL

## 2020-01-27 VITALS
HEIGHT: 66 IN | OXYGEN SATURATION: 100 % | TEMPERATURE: 98.2 F | BODY MASS INDEX: 21.69 KG/M2 | DIASTOLIC BLOOD PRESSURE: 66 MMHG | SYSTOLIC BLOOD PRESSURE: 119 MMHG | WEIGHT: 135 LBS | RESPIRATION RATE: 18 BRPM | HEART RATE: 70 BPM

## 2020-01-27 VITALS
OXYGEN SATURATION: 98 % | DIASTOLIC BLOOD PRESSURE: 50 MMHG | RESPIRATION RATE: 20 BRPM | SYSTOLIC BLOOD PRESSURE: 110 MMHG

## 2020-01-27 PROCEDURE — 2580000003 HC RX 258: Performed by: INTERNAL MEDICINE

## 2020-01-27 PROCEDURE — 3700000001 HC ADD 15 MINUTES (ANESTHESIA): Performed by: INTERNAL MEDICINE

## 2020-01-27 PROCEDURE — 2709999900 HC NON-CHARGEABLE SUPPLY: Performed by: INTERNAL MEDICINE

## 2020-01-27 PROCEDURE — 3609017100 HC EGD: Performed by: INTERNAL MEDICINE

## 2020-01-27 PROCEDURE — 6360000002 HC RX W HCPCS

## 2020-01-27 PROCEDURE — 2780000010 HC IMPLANT OTHER: Performed by: INTERNAL MEDICINE

## 2020-01-27 PROCEDURE — 7100000011 HC PHASE II RECOVERY - ADDTL 15 MIN: Performed by: INTERNAL MEDICINE

## 2020-01-27 PROCEDURE — 3609015500 HC GASTRIC/DUODENAL MOTILITY &/OR MANOMETRY STUDY: Performed by: INTERNAL MEDICINE

## 2020-01-27 PROCEDURE — 7100000010 HC PHASE II RECOVERY - FIRST 15 MIN: Performed by: INTERNAL MEDICINE

## 2020-01-27 PROCEDURE — 43239 EGD BIOPSY SINGLE/MULTIPLE: CPT | Performed by: INTERNAL MEDICINE

## 2020-01-27 PROCEDURE — 3700000000 HC ANESTHESIA ATTENDED CARE: Performed by: INTERNAL MEDICINE

## 2020-01-27 PROCEDURE — 88305 TISSUE EXAM BY PATHOLOGIST: CPT

## 2020-01-27 PROCEDURE — 2500000003 HC RX 250 WO HCPCS

## 2020-01-27 RX ORDER — LIDOCAINE HYDROCHLORIDE 20 MG/ML
INJECTION, SOLUTION EPIDURAL; INFILTRATION; INTRACAUDAL; PERINEURAL PRN
Status: DISCONTINUED | OUTPATIENT
Start: 2020-01-27 | End: 2020-01-27 | Stop reason: SDUPTHER

## 2020-01-27 RX ORDER — PROPOFOL 10 MG/ML
INJECTION, EMULSION INTRAVENOUS PRN
Status: DISCONTINUED | OUTPATIENT
Start: 2020-01-27 | End: 2020-01-27 | Stop reason: SDUPTHER

## 2020-01-27 RX ORDER — SODIUM CHLORIDE, SODIUM LACTATE, POTASSIUM CHLORIDE, CALCIUM CHLORIDE 600; 310; 30; 20 MG/100ML; MG/100ML; MG/100ML; MG/100ML
INJECTION, SOLUTION INTRAVENOUS CONTINUOUS
Status: DISCONTINUED | OUTPATIENT
Start: 2020-01-27 | End: 2020-01-27 | Stop reason: HOSPADM

## 2020-01-27 RX ADMIN — LIDOCAINE HYDROCHLORIDE 100 MG: 20 INJECTION, SOLUTION EPIDURAL; INFILTRATION; INTRACAUDAL; PERINEURAL at 09:55

## 2020-01-27 RX ADMIN — PROPOFOL 100 MG: 10 INJECTION, EMULSION INTRAVENOUS at 09:55

## 2020-01-27 RX ADMIN — SODIUM CHLORIDE, POTASSIUM CHLORIDE, SODIUM LACTATE AND CALCIUM CHLORIDE: 600; 310; 30; 20 INJECTION, SOLUTION INTRAVENOUS at 09:20

## 2020-01-27 RX ADMIN — PROPOFOL 100 MG: 10 INJECTION, EMULSION INTRAVENOUS at 10:02

## 2020-01-27 ASSESSMENT — PAIN - FUNCTIONAL ASSESSMENT: PAIN_FUNCTIONAL_ASSESSMENT: 0-10

## 2020-01-27 NOTE — H&P
Patient Name: Ernestina Vincent  : 1965  MRN: [id number]  DATE: 20    Allergies: Allergies   Allergen Reactions    Iohexol Rash     This is IV Contrast    Sulfa Antibiotics Rash        ENDOSCOPY  History and Physical    Procedure:    [] Diagnostic Colonoscopy       [] Screening Colonoscopy  [x] EGD      [] ERCP      [] EUS       [] Other    [x] Previous office notes/History and Physical reviewed from the patients chart. Please see EMR for further details of HPI. I have examined the patient's status immediately prior to the procedure and:      Indications/HPI:     1. Gastroesophageal reflux disease, esophagitis presence not specified      2. Epigastric pain      3. Belching      4. Bloating      5. Heartburn      6. Hoarseness of voice      7. Chronic constipation      8. History of esophageal dilatation         []Abdominal Pain   []Cancer- GI/Lung     []Fhx of colon CA/polyps  []History of Polyps  []Barretts            []Melena  []Abnormal Imaging              []Dysphagia              []Persistent Pneumonia   []Anemia                            []Food Impaction        []History of Polyps  [] GI Bleed             []Pulmonary nodule/Mass   []Change in bowel habits []Heartburn/Reflux  []Rectal Bleed (BRBPR)  []Chest Pain - Non Cardiac []Heme (+) Stool []Ulcers  []Constipation  []Hemoptysis  []Varices  []Diarrhea  []Hypoxemia    []Nausea/Vomiting   []Screening   []Crohns/Colitis  []Other:     Anesthesia:   [x] MAC [] Moderate Sedation   [] General   [] None     ROS: 12 pt Review of Symptoms was negative unless mentioned above    Medications:   Prior to Admission medications    Medication Sig Start Date End Date Taking? Authorizing Provider   temazepam (RESTORIL) 30 MG capsule Take 1 capsule by mouth nightly as needed for anxiety or sleep.  19 Yes John Connolly MD   desvenlafaxine succinate (PRISTIQ) 50 MG TB24 extended release tablet Take 1 tablet by mouth daily 19  Yes John Connolly MD pantoprazole (PROTONIX) 40 MG tablet Take 1 tablet by mouth 2 times daily 11/14/19  Yes Tram Rivas MD   Cholecalciferol (VITAMIN D) 2000 units CAPS capsule Take by mouth   Yes Historical Provider, MD   cetirizine (ZYRTEC) 10 MG tablet Take 1 tablet by mouth daily 0/07/17  Yes GRACIE Stewart   topiramate (TOPAMAX) 25 MG tablet Take 25 mg by mouth 2 times daily   Yes Historical Provider, MD   Black Cohosh 540 MG CAPS Take 540 mg by mouth   Yes Historical Provider, MD   estradiol (ESTRACE) 2 MG tablet Take 2 mg by mouth daily 11/29/16  Yes Historical Provider, MD   polyethylene glycol (GLYCOLAX) powder Take 1 g by mouth as needed 11/14/16  Yes Historical Provider, MD   Probiotic Product (ALIGN PO) Take by mouth    Historical Provider, MD   fluticasone (FLONASE) 50 MCG/ACT nasal spray 2 sprays    Historical Provider, MD       Past Medical History:  Past Medical History:   Diagnosis Date    Allergic rhinitis     Anxiety     Arthritis     Osetopenia    Irritable bowel syndrome     Irritable bowel syndrome (IBS)     Sinus infection        Past Surgical History:  Past Surgical History:   Procedure Laterality Date    BLADDER SURGERY  2013    vaginal tape    COLONOSCOPY  10/26/2015    Dr Shamir Haynes AP, active colitis    HYSTERECTOMY      one ovary left    SINUS SURGERY      TONSILLECTOMY      UPPER GASTROINTESTINAL ENDOSCOPY  10/16/2015    Dr Kwaku Dillard-w/balloon [id number]- Hiatal hernia, GEJ stricture, erythema in mid to distal esophagus-    UPPER GASTROINTESTINAL ENDOSCOPY  08/23/2017    Dr Dave An-w/balloon [id number] hernia, normal path    UPPER GASTROINTESTINAL ENDOSCOPY  03/15/2019    Dr Kwaku Dillard-w/balloon [id number] hernia, GEJ stricture, reflux    WISDOM TOOTH EXTRACTION         Social History:  Social History     Tobacco Use    Smoking status: Never Smoker    Smokeless tobacco: Never Used   Substance Use

## 2020-01-27 NOTE — ANESTHESIA PRE PROCEDURE
Department of Anesthesiology  Preprocedure Note       Name:  Talita Campbell   Age:  47 y.o.  :  1965                                          MRN:  [id number]         Date:  2020      Surgeon: Benetta Nissen):  Sapna Holland MD    Procedure: EGD ESOPHAGEAL MANOMETRY AND PH MONITORING 24 HR (N/A )  EGD DIAGNOSTIC ONLY (N/A )    Medications prior to admission:   Prior to Admission medications    Medication Sig Start Date End Date Taking? Authorizing Provider   temazepam (RESTORIL) 30 MG capsule Take 1 capsule by mouth nightly as needed for anxiety or sleep. 19  Lisseth Woody MD   desvenlafaxine succinate (PRISTIQ) 50 MG TB24 extended release tablet Take 1 tablet by mouth daily 19   Lisseth Woody MD   pantoprazole (PROTONIX) 40 MG tablet Take 1 tablet by mouth 2 times daily 19   Lisseth Woody MD   Cholecalciferol (VITAMIN D) 2000 units CAPS capsule Take by mouth    Historical Provider, MD   cetirizine (ZYRTEC) 10 MG tablet Take 1 tablet by mouth daily 68   GRACIE Hawthorne   topiramate (TOPAMAX) 25 MG tablet Take 25 mg by mouth 2 times daily    Historical Provider, MD   Probiotic Product (ALIGN PO) Take by mouth    Historical Provider, MD   Black Cohosh 540 MG CAPS Take 540 mg by mouth    Historical Provider, MD   fluticasone (FLONASE) 50 MCG/ACT nasal spray 2 sprays    Historical Provider, MD   estradiol (ESTRACE) 2 MG tablet Take 2 mg by mouth daily 16   Historical Provider, MD   polyethylene glycol (GLYCOLAX) powder Take 1 g by mouth as needed 16   Historical Provider, MD       Current medications:    Current Facility-Administered Medications   Medication Dose Route Frequency Provider Last Rate Last Dose    lactated ringers infusion   Intravenous Continuous Sapna Holland MD           Allergies:     Allergies   Allergen Reactions    Iohexol Rash     This is IV Contrast    Sulfa Antibiotics Rash       Problem List:    Patient Active Problem List kg)   12/04/19 132 lb (59.9 kg)   11/14/19 134 lb (60.8 kg)     There is no height or weight on file to calculate BMI.    CBC:   Lab Results   Component Value Date    WBC 8.1 02/21/2019    RBC 4.53 02/21/2019    HGB 13.0 02/21/2019    HCT 41.7 02/21/2019    MCV 92.1 02/21/2019    RDW 16.1 02/21/2019     02/21/2019       CMP:   Lab Results   Component Value Date     02/21/2019    K 4.7 02/21/2019     02/21/2019    CO2 26 02/21/2019    BUN 13 02/21/2019    CREATININE 0.7 02/21/2019    LABGLOM >60 02/21/2019    GLUCOSE 96 02/21/2019    PROT 7.4 02/21/2019    CALCIUM 9.8 02/21/2019    BILITOT 0.5 02/21/2019    ALKPHOS 79 02/21/2019    AST 20 02/21/2019    ALT 14 02/21/2019       POC Tests: No results for input(s): POCGLU, POCNA, POCK, POCCL, POCBUN, POCHEMO, POCHCT in the last 72 hours. Coags: No results found for: PROTIME, INR, APTT    HCG (If Applicable): No results found for: PREGTESTUR, PREGSERUM, HCG, HCGQUANT     ABGs: No results found for: PHART, PO2ART, IPC3LCU, OUL6FBS, BEART, X1JHLUKU     Type & Screen (If Applicable):  No results found for: LABABO, [address]    Anesthesia Evaluation  Patient summary reviewed and Nursing notes reviewed no history of anesthetic complications:   Airway: Mallampati: II  TM distance: >3 FB   Neck ROM: full  Mouth opening: > = 3 FB Dental:          Pulmonary:Negative Pulmonary ROS and normal exam                               Cardiovascular:             Beta Blocker:  Not on Beta Blocker         Neuro/Psych:   (+) depression/anxiety             GI/Hepatic/Renal:   (+) GERD:, bowel prep,           Endo/Other: Negative Endo/Other ROS                    Abdominal:           Vascular: negative vascular ROS. Anesthesia Plan      general and TIVA     ASA 2       Induction: intravenous. Anesthetic plan and risks discussed with patient.                       Padmini Keane, APRN - CRNA   1/27/2020

## 2020-01-27 NOTE — OP NOTE
Endoscopic Procedure Note    Patient: Macario Barragan : 1965  Med Rec#: [id number] Acc#: [id number]     Primary Care Provider Sofya Garcia MD    Endoscopist: Gretchen Salamanca MD    Date of Procedure:  2020    Procedure:   1. EGD with cold biopsies and placement of a Bravo pH measuring device into the Esophagus. Indications:   1. Gastroesophageal reflux disease, esophagitis presence not specified      2. Epigastric pain      3. Belching      4. Bloating      5. Heartburn      6. Hoarseness of voice      7. Chronic constipation      8. History of esophageal dilatation       Anesthesia:  Sedation was administered by anesthesia who monitored the patient during the procedure. Estimated Blood Loss: minimal    Procedure:   After reviewing the patient's chart and obtaining informed consent, the patient was placed in the left lateral decubitus position. A forward-viewing Olympus endoscope was lubricated and inserted through the mouth into the oropharynx. Under direct visualization, the upper esophagus was intubated. The scope was advanced to the level of the third portion of duodenum. Scope was slowly withdrawn with careful inspection of the mucosal surfaces. The scope was retroflexed for inspection of the gastric fundus and incisura. Findings and maneuvers are listed in impression below. BRAVO placement: Given her persistent symptoms of reflux disease, we had decided to placed a BRAVO pH monitor. The endoscope was pulled into the upper esophagus and the BRAVO deployment device was advanced into the esophagus. Once we confirmed the device was in the esophagus, the BRAVO ph monitor was suctioned to the esophageal wall for 30 seconds at 31cm from the incisors. It was then successfully deployed. Proper placement was confirmed endoscopically. The patient tolerated the procedure well. The scope was removed. There were no immediate complications.     Findings:   Esophagus: normal and a normal EG junction at 37 cm. A wireless Bravo pH monitor was successfully placed into the Esophagus at 31 cm from the incisors. There is NO hiatal hernia present. Stomach:  normal.      Duodenum: normal; random biopsies were taken to check for Celiac disease. RECOMMENDATIONS:    1. Await path results, the patient will be contacted in 7-10 days with biopsy results. 2.  - Resume previous meds (except stay off of PPI or H2RA until pH study is completed) and diet  - GI clinic f/u 6 weeks. - Keep scheduled f/u appts with other MDs     - NO ASA/NSAIDs x 2 weeks    The results were discussed with the patient and family. A copy of the images obtained were given to the patient.      Nan Snow MD  1/27/2020  9:55 AM

## 2020-02-06 ENCOUNTER — OFFICE VISIT (OUTPATIENT)
Dept: GASTROENTEROLOGY | Age: 55
End: 2020-02-06

## 2020-02-06 VITALS
WEIGHT: 135.2 LBS | SYSTOLIC BLOOD PRESSURE: 118 MMHG | HEIGHT: 66 IN | OXYGEN SATURATION: 98 % | HEART RATE: 91 BPM | DIASTOLIC BLOOD PRESSURE: 70 MMHG | BODY MASS INDEX: 21.73 KG/M2

## 2020-02-06 PROCEDURE — 99999 PR OFFICE/OUTPT VISIT,PROCEDURE ONLY: CPT | Performed by: INTERNAL MEDICINE

## 2020-02-06 ASSESSMENT — ENCOUNTER SYMPTOMS
RESPIRATORY NEGATIVE: 1
EYES NEGATIVE: 1
ABDOMINAL PAIN: 1
ALLERGIC/IMMUNOLOGIC NEGATIVE: 1
ABDOMINAL DISTENTION: 1

## 2020-02-06 NOTE — PATIENT INSTRUCTIONS
Patient Education        Indigestion (Dyspepsia or Heartburn): Care Instructions  Your Care Instructions  Sometimes it can be hard to pinpoint the cause of indigestion. (It is also called dyspepsia or heartburn.) Most cases of an upset stomach with bloating, burning, burping, and nausea are minor and go away within several hours. Home treatment and over-the-counter medicine often are able to control symptoms. But if you take medicine to relieve your indigestion without making diet and lifestyle changes, your symptoms are likely to return again and again. If you get indigestion often, it may be a sign of a more serious medical problem. Be sure to follow up with your doctor, who may want to do tests to be sure of the cause of your indigestion. Follow-up care is a key part of your treatment and safety. Be sure to make and go to all appointments, and call your doctor if you are having problems. It's also a good idea to know your test results and keep a list of the medicines you take. How can you care for yourself at home? · Your doctor may recommend over-the-counter medicine. For mild or occasional indigestion, antacids such as Gaviscon, Mylanta, Maalox, or Tums, may help. Be safe with medicines. Be careful when you take over-the-counter antacid medicines. Many of these medicines have aspirin in them. Read the label to make sure that you are not taking more than the recommended dose. Too much aspirin can be harmful. · Your doctor also may recommend over-the-counter acid reducers, such as Pepcid AC, Tagamet HB, Zantac 75, or Prilosec. Read and follow all instructions on the label. If you use these medicines often, talk with your doctor. · Change your eating habits. ? It's best to eat several small meals instead of two or three large meals. ? After you eat, wait 2 to 3 hours before you lie down. ? Chocolate, mint, and alcohol can make GERD worse. ?  Spicy foods, foods that have a lot of acid (like tomatoes and oranges), and coffee can make GERD symptoms worse in some people. If your symptoms are worse after you eat a certain food, you may want to stop eating that food to see if your symptoms get better. · Do not smoke or chew tobacco. Smoking can make GERD worse. If you need help quitting, talk to your doctor about stop-smoking programs and medicines. These can increase your chances of quitting for good. · If you have GERD symptoms at night, raise the head of your bed 6 to 8 inches. You can do this by putting the frame on blocks or placing a foam wedge under the head of your mattress. (Adding extra pillows does not work.)  · Do not wear tight clothing around your middle. · Lose weight if you need to. Losing just 5 to 10 pounds can help. · Do not take anti-inflammatory medicines, such as aspirin, ibuprofen (Advil, Motrin), or naproxen (Aleve). These can irritate the stomach. If you need a pain medicine, try acetaminophen (Tylenol), which does not cause stomach upset. When should you call for help? Call your doctor now or seek immediate medical care if:    · You have new or worse belly pain.     · You are vomiting.    Watch closely for changes in your health, and be sure to contact your doctor if:    · You have new or worse symptoms of indigestion.     · You have trouble or pain swallowing.     · You are losing weight.     · You do not get better as expected. Where can you learn more? Go to https://[employer]. org and sign in to your [employer] account. Enter Z173 in the [facility] box to learn more about \"Indigestion (Dyspepsia or Heartburn): Care Instructions. \"     If you do not have an account, please click on the \"Sign Up Now\" link. Current as of: August 11, 2019  Content Version: 12.3  © 9685-3156 Healthwise, Incorporated. Care instructions adapted under license by [facility] ([facility]).  If you have questions about a medical condition or this instruction, always ask your healthcare have an account, please click on the \"Sign Up Now\" link. Current as of: June 26, 2019  Content Version: 12.3  © 5283-1556 [employer], Incorporated. Care instructions adapted under license by [facility] ([facility]). If you have questions about a medical condition or this instruction, always ask your healthcare professional. [address] any warranty or liability for your use of this information.

## 2020-02-06 NOTE — PROGRESS NOTES
Subjective:      Patient ID: Sami Lewis is a 47 y.o. female. HPI     Patient is here for f/u of her recent EGD and Esophageal pH study results. She had a hx of chronic GERD and dyspepsia with persistent symptoms including bloating, belching, upper abdominal discomfort as well as chronic intermittent constipation. She has undergone extensive workup elsewhere prior to her last visit here few weeks ago. She has persistent symptoms despite staying on PPI therapy. Her recent EGD was essentially normal. NO Hiatal hernia or findings of overt GERD were noted. Duodenal biopsies failed to reveal villous atrophy or other evidence of Celiac disease. Her 48 h Rojas pH study did NOT show any evidence of GERD although patient had completely stayed off of any antacids or acid suppressing medications in the preceding 2 weeks. Review of Systems   Constitutional: Negative. HENT: Negative. Eyes: Negative. Respiratory: Negative. Cardiovascular: Negative. Gastrointestinal: Positive for abdominal distention and abdominal pain. Bloating, intermittent belching present. NO heartburn or chest pain or hoarseness of voice or dysphagia   Endocrine: Negative. Genitourinary: Negative. Musculoskeletal: Negative. Skin: Negative. Allergic/Immunologic: Negative. Neurological: Negative. Hematological: Negative. Psychiatric/Behavioral: Negative. Objective:   Physical Exam  Constitutional:       Appearance: Normal appearance. She is normal weight. HENT:      Head: Normocephalic and atraumatic. Right Ear: External ear normal.      Left Ear: External ear normal.      Nose: Nose normal.      Mouth/Throat:      Mouth: Mucous membranes are moist.      Pharynx: Oropharynx is clear. No oropharyngeal exudate. Eyes:      General: No scleral icterus. Right eye: No discharge. Left eye: No discharge. Extraocular Movements: Extraocular movements intact. Conjunctiva/sclera: Conjunctivae normal.      Pupils: Pupils are equal, round, and reactive to light. Neck:      Musculoskeletal: Normal range of motion and neck supple. No neck rigidity. Cardiovascular:      Rate and Rhythm: Normal rate and regular rhythm. Pulses: Normal pulses. Heart sounds: Normal heart sounds. Pulmonary:      Effort: Pulmonary effort is normal. No respiratory distress. Breath sounds: Normal breath sounds. No wheezing. Abdominal:      General: Abdomen is flat. Bowel sounds are normal. There is no distension. Musculoskeletal:         General: No swelling or deformity. Skin:     General: Skin is warm. Capillary Refill: Capillary refill takes less than 2 seconds. Coloration: Skin is not jaundiced. Neurological:      General: No focal deficit present. Mental Status: She is alert and oriented to person, place, and time. Gait: Gait normal.   Psychiatric:         Mood and Affect: Mood normal.         Behavior: Behavior normal.         Thought Content: Thought content normal.         Judgment: Judgment normal.         Assessment:      1. Non ulcer dyspepsia with intermittent bloating, belching, epigastric pain   2. Past hx of GERD but recent EGD with 48 h Esophageal pH monitoring was negative for GERD  3. Chronic intermittent constipation  4. Hx of adenomatous colon polyps   Plan:    -Continue measures to reduce aerophagia; maintain a food diary; avoid foods that trigger her symptoms. Eat a low fat fiber rich food and may continue use of probiotics or probiotic yogurt daily. May use OTC antacids PRN but stop her PPI use since she does not have any confirmed evidence of active GERD. May use OTC laxatives such as Milk of Mag or Miralax for her constipation. IF necessary, will consider a trial of a low dose tricyclic antidepressant such as Elavil  or Nortryptaline for her Non-ulcer dyspepsia/IBS type symptoms.     -will consider empirical therapy or further evaluation for conditions including SIBO if her symptoms persists. Will also consider agents such as Amitiza or Linzess for her constipation.    -She will f/u with me or in our GI clinic PRN and keep her appointments with other MDs as scheduled. She will call our office to set up her Colonoscopy for [facility] in the future. I have seen, examined and reviewed this patient medication list, appropriate labs and imaging studies. I reviewed relevant medical records and others physicians notes. I discussed the recent test results and plans of care with the patient. I answered all the questions to the patients satisfaction.       Tyrone Flores MD

## 2020-02-18 ENCOUNTER — OFFICE VISIT (OUTPATIENT)
Dept: INTERNAL MEDICINE | Age: 55
End: 2020-02-18
Payer: COMMERCIAL

## 2020-02-18 VITALS
DIASTOLIC BLOOD PRESSURE: 70 MMHG | WEIGHT: 136 LBS | HEART RATE: 90 BPM | RESPIRATION RATE: 18 BRPM | OXYGEN SATURATION: 99 % | BODY MASS INDEX: 21.86 KG/M2 | SYSTOLIC BLOOD PRESSURE: 122 MMHG | HEIGHT: 66 IN

## 2020-02-18 PROBLEM — K21.9 GASTROESOPHAGEAL REFLUX DISEASE WITHOUT ESOPHAGITIS: Status: RESOLVED | Noted: 2018-04-17 | Resolved: 2020-02-18

## 2020-02-18 PROCEDURE — 90471 IMMUNIZATION ADMIN: CPT | Performed by: INTERNAL MEDICINE

## 2020-02-18 PROCEDURE — 90750 HZV VACC RECOMBINANT IM: CPT | Performed by: INTERNAL MEDICINE

## 2020-02-18 PROCEDURE — 99213 OFFICE O/P EST LOW 20 MIN: CPT | Performed by: INTERNAL MEDICINE

## 2020-02-18 RX ORDER — TEMAZEPAM 30 MG/1
CAPSULE ORAL
Qty: 30 CAPSULE | Refills: 2 | Status: SHIPPED | OUTPATIENT
Start: 2020-02-18 | End: 2020-06-08 | Stop reason: SDUPTHER

## 2020-02-18 RX ORDER — DESVENLAFAXINE 50 MG/1
50 TABLET, EXTENDED RELEASE ORAL DAILY
Qty: 30 TABLET | Refills: 3 | Status: SHIPPED | OUTPATIENT
Start: 2020-02-18 | End: 2020-06-08 | Stop reason: SDUPTHER

## 2020-02-18 RX ORDER — HYOSCYAMINE SULFATE 0.125 MG
250 TABLET,DISINTEGRATING ORAL 3 TIMES DAILY PRN
Qty: 40 TABLET | Refills: 0 | Status: SHIPPED | OUTPATIENT
Start: 2020-02-18 | End: 2020-06-08 | Stop reason: SDUPTHER

## 2020-02-18 ASSESSMENT — ENCOUNTER SYMPTOMS
EYE REDNESS: 0
COUGH: 0
VOICE CHANGE: 0
WHEEZING: 0
DIARRHEA: 0
NAUSEA: 0
ABDOMINAL DISTENTION: 1
VOMITING: 0
TROUBLE SWALLOWING: 0
COLOR CHANGE: 0
EYE PAIN: 0
SINUS PRESSURE: 0
ABDOMINAL PAIN: 0
CHEST TIGHTNESS: 0
BLOOD IN STOOL: 0
SORE THROAT: 0
CONSTIPATION: 1

## 2020-02-25 ENCOUNTER — TELEPHONE (OUTPATIENT)
Dept: GASTROENTEROLOGY | Age: 55
End: 2020-02-25

## 2020-05-18 ENCOUNTER — HOSPITAL ENCOUNTER (OUTPATIENT)
Dept: WOMENS IMAGING | Age: 55
Discharge: HOME | End: 2020-05-18
Payer: COMMERCIAL

## 2020-05-18 DIAGNOSIS — Z00.00 ANNUAL PHYSICAL EXAM: ICD-10-CM

## 2020-05-18 DIAGNOSIS — M85.89 OSTEOPENIA OF MULTIPLE SITES: ICD-10-CM

## 2020-05-18 DIAGNOSIS — E55.9 VITAMIN D DEFICIENCY: ICD-10-CM

## 2020-05-18 DIAGNOSIS — Z11.59 NEED FOR HEPATITIS C SCREENING TEST: ICD-10-CM

## 2020-05-18 DIAGNOSIS — Z11.4 SCREENING FOR HIV (HUMAN IMMUNODEFICIENCY VIRUS): ICD-10-CM

## 2020-05-18 DIAGNOSIS — F51.01 PRIMARY INSOMNIA: ICD-10-CM

## 2020-05-18 LAB
ALBUMIN SERPL-MCNC: 4.1 G/DL (ref 3.5–5.2)
ALP BLD-CCNC: 79 U/L (ref 35–104)
ALT SERPL-CCNC: 25 U/L (ref 5–33)
AMPHETAMINE SCREEN, URINE: NEGATIVE
ANION GAP SERPL CALCULATED.3IONS-SCNC: 14 MMOL/L (ref 7–19)
AST SERPL-CCNC: 27 U/L (ref 5–32)
BACTERIA: NEGATIVE /HPF
BARBITURATE SCREEN URINE: NEGATIVE
BASOPHILS ABSOLUTE: 0 K/UL (ref 0–0.2)
BASOPHILS RELATIVE PERCENT: 0.4 % (ref 0–1)
BENZODIAZEPINE SCREEN, URINE: NEGATIVE
BILIRUB SERPL-MCNC: 0.4 MG/DL (ref 0.2–1.2)
BILIRUBIN URINE: NEGATIVE
BLOOD, URINE: NEGATIVE
BUN BLDV-MCNC: 14 MG/DL (ref 6–20)
CALCIUM SERPL-MCNC: 9.6 MG/DL (ref 8.6–10)
CANNABINOID SCREEN URINE: NEGATIVE
CHLORIDE BLD-SCNC: 103 MMOL/L (ref 98–111)
CHOLESTEROL, TOTAL: 208 MG/DL (ref 160–199)
CLARITY: CLEAR
CO2: 26 MMOL/L (ref 22–29)
COCAINE METABOLITE SCREEN URINE: NEGATIVE
COLOR: YELLOW
CREAT SERPL-MCNC: 0.6 MG/DL (ref 0.5–0.9)
EOSINOPHILS ABSOLUTE: 0 K/UL (ref 0–0.6)
EOSINOPHILS RELATIVE PERCENT: 0.5 % (ref 0–5)
EPITHELIAL CELLS, UA: 1 /HPF (ref 0–5)
GFR NON-AFRICAN AMERICAN: >60
GLUCOSE BLD-MCNC: 104 MG/DL (ref 74–109)
GLUCOSE URINE: NEGATIVE MG/DL
HCT VFR BLD CALC: 39.8 % (ref 37–47)
HDLC SERPL-MCNC: 84 MG/DL (ref 65–121)
HEMOGLOBIN: 12.5 G/DL (ref 12–16)
HEPATITIS C ANTIBODY INTERPRETATION: NORMAL
HIV-1 P24 AG: NORMAL
HYALINE CASTS: 1 /HPF (ref 0–8)
IMMATURE GRANULOCYTES #: 0 K/UL
KETONES, URINE: NEGATIVE MG/DL
LDL CHOLESTEROL CALCULATED: 86 MG/DL
LEUKOCYTE ESTERASE, URINE: ABNORMAL
LYMPHOCYTES ABSOLUTE: 1.7 K/UL (ref 1.1–4.5)
LYMPHOCYTES RELATIVE PERCENT: 23.8 % (ref 20–40)
Lab: NORMAL
MCH RBC QN AUTO: 28.2 PG (ref 27–31)
MCHC RBC AUTO-ENTMCNC: 31.4 G/DL (ref 33–37)
MCV RBC AUTO: 89.8 FL (ref 81–99)
MONOCYTES ABSOLUTE: 0.4 K/UL (ref 0–0.9)
MONOCYTES RELATIVE PERCENT: 5.3 % (ref 0–10)
NEUTROPHILS ABSOLUTE: 5.1 K/UL (ref 1.5–7.5)
NEUTROPHILS RELATIVE PERCENT: 69.9 % (ref 50–65)
NITRITE, URINE: NEGATIVE
OPIATE SCREEN URINE: NEGATIVE
PDW BLD-RTO: 15.3 % (ref 11.5–14.5)
PH UA: 7 (ref 5–8)
PLATELET # BLD: 180 K/UL (ref 130–400)
PMV BLD AUTO: 12.8 FL (ref 9.4–12.3)
POTASSIUM SERPL-SCNC: 4.6 MMOL/L (ref 3.5–5)
PROTEIN UA: NEGATIVE MG/DL
RAPID HIV 1&2: NORMAL
RBC # BLD: 4.43 M/UL (ref 4.2–5.4)
RBC UA: 0 /HPF (ref 0–4)
SODIUM BLD-SCNC: 143 MMOL/L (ref 136–145)
SPECIFIC GRAVITY UA: 1.01 (ref 1–1.03)
TOTAL PROTEIN: 7.3 G/DL (ref 6.6–8.7)
TRIGL SERPL-MCNC: 192 MG/DL (ref 0–149)
TSH SERPL DL<=0.05 MIU/L-ACNC: 1.41 UIU/ML (ref 0.27–4.2)
UROBILINOGEN, URINE: 0.2 E.U./DL
VITAMIN D 25-HYDROXY: 62.2 NG/ML
WBC # BLD: 7.3 K/UL (ref 4.8–10.8)
WBC UA: 1 /HPF (ref 0–5)

## 2020-05-18 PROCEDURE — 77063 BREAST TOMOSYNTHESIS BI: CPT

## 2020-05-18 PROCEDURE — 77080 DXA BONE DENSITY AXIAL: CPT

## 2020-06-08 ENCOUNTER — OFFICE VISIT (OUTPATIENT)
Dept: INTERNAL MEDICINE | Age: 55
End: 2020-06-08
Payer: COMMERCIAL

## 2020-06-08 VITALS
SYSTOLIC BLOOD PRESSURE: 100 MMHG | WEIGHT: 130 LBS | OXYGEN SATURATION: 98 % | RESPIRATION RATE: 18 BRPM | BODY MASS INDEX: 20.89 KG/M2 | DIASTOLIC BLOOD PRESSURE: 78 MMHG | HEIGHT: 66 IN | HEART RATE: 98 BPM

## 2020-06-08 PROCEDURE — 99396 PREV VISIT EST AGE 40-64: CPT | Performed by: INTERNAL MEDICINE

## 2020-06-08 RX ORDER — DESVENLAFAXINE 50 MG/1
50 TABLET, EXTENDED RELEASE ORAL DAILY
Qty: 30 TABLET | Refills: 3 | Status: SHIPPED | OUTPATIENT
Start: 2020-06-08 | End: 2020-09-08 | Stop reason: SDUPTHER

## 2020-06-08 RX ORDER — HYOSCYAMINE SULFATE 0.125 MG
250 TABLET,DISINTEGRATING ORAL 3 TIMES DAILY PRN
Qty: 40 TABLET | Refills: 0 | Status: SHIPPED | OUTPATIENT
Start: 2020-06-08 | End: 2020-08-24

## 2020-06-08 RX ORDER — TEMAZEPAM 30 MG/1
CAPSULE ORAL
Qty: 30 CAPSULE | Refills: 2 | Status: SHIPPED | OUTPATIENT
Start: 2020-06-08 | End: 2020-09-08 | Stop reason: SDUPTHER

## 2020-06-08 ASSESSMENT — ENCOUNTER SYMPTOMS
SORE THROAT: 0
CONSTIPATION: 0
COUGH: 0
SINUS PRESSURE: 0
NAUSEA: 0
TROUBLE SWALLOWING: 0
VOMITING: 0
COLOR CHANGE: 0
CHEST TIGHTNESS: 0
ABDOMINAL PAIN: 0
EYE PAIN: 0
EYE REDNESS: 0
WHEEZING: 0
BLOOD IN STOOL: 0
VOICE CHANGE: 0
DIARRHEA: 0

## 2020-06-08 NOTE — PROGRESS NOTES
Chief Complaint   Patient presents with    Annual Exam     History of presenting illness:  Manuela Garcias is a50 y.o. female who presents today for follow up on her chronic medical conditions as noted below.     Generalized anxiety disorder  Doing well on decreased dose Pristiq 50 daily  He has been under a significant amount of stress related to family issues-  Her father now at assisted living at [facility] ( Parkinsonism)  Also stress associated with her grandson- dx with autism ( [place] [facility] [place] [facility]/ ++ gene mutation found)     Primary insomnia  Needs meds refills     Gastroesophageal reflux disease without esophagitis  Has seen GI and had endoscopy  Diagnosed with irritable bowel syndrome/no acid reflux was seen and Protonix was discontinued by GI    Patient Active Problem List    Diagnosis Date Noted    Exposure to influenza 08/29/2019    Status post dilatation of esophageal stricture 04/22/2019    Primary insomnia 07/17/2018    Osteopenia of multiple sites 07/17/2018     Overview Note:     3/2018  * lumbar spine average T score is -1.45, she does have T score of -2.2 at L4  * Left hip T score -1.8, neck -1.9,troch -2.1  * Right hip total -1.6, neck -1.9,troch -1.8  * Radius total -1.5  5/2020 Lumbar T score -1.7( L4 -2.4)/ hip bilaterally -1.5/ -1.3        Orthostatic dizziness 07/17/2018    Generalized anxiety disorder 04/17/2018    Menopause ovarian failure 04/17/2018     Past Medical History:   Diagnosis Date    Allergic rhinitis     Anxiety     Arthritis     Osetopenia    Irritable bowel syndrome     Irritable bowel syndrome (IBS)     Sinus infection       Past Surgical History:   Procedure Laterality Date    BLADDER SURGERY  2013    vaginal tape    COLONOSCOPY  10/26/2015    Dr Loni Harris AP, active colitis    HYSTERECTOMY      one ovary left    SINUS SURGERY      TONSILLECTOMY      UPPER GASTROINTESTINAL ENDOSCOPY  10/16/2015    Dr Mook Mendez murmur. Pulmonary:      Effort: Pulmonary effort is normal. No respiratory distress. Breath sounds: Normal breath sounds. No wheezing. Chest:      Chest wall: No tenderness. Abdominal:      General: Bowel sounds are normal.      Palpations: Abdomen is soft. There is no mass. Tenderness: There is no abdominal tenderness. Musculoskeletal: Normal range of motion. General: No tenderness. Lymphadenopathy:      Cervical: No cervical adenopathy. Skin:     General: Skin is warm and dry. Findings: No erythema or rash. Neurological:      Mental Status: She is alert and oriented to person, place, and time. Cranial Nerves: No cranial nerve deficit. Coordination: Coordination normal.      Deep Tendon Reflexes: Reflexes are normal and symmetric.    Psychiatric:         Behavior: Behavior normal.         Lab Review   Orders Only on 05/18/2020   Component Date Value    Amphetamine Screen, Urine 05/18/2020 Negative     Barbiturate Screen, Ur 05/18/2020 Negative     Benzodiazepine Screen, U* 05/18/2020 Negative     Cannabinoid Scrn, Ur 05/18/2020 Negative     Cocaine Metabolite Scree* 05/18/2020 Negative     Opiate Scrn, Ur 05/18/2020 Negative     Drug Screen Comment: 05/18/2020 see below     Vit D, 25-Hydroxy 05/18/2020 62.2     TSH 05/18/2020 1.410     Color, UA 05/18/2020 YELLOW     Clarity, UA 05/18/2020 Clear     Glucose, Ur 05/18/2020 Negative     Bilirubin Urine 05/18/2020 Negative     Ketones, Urine 05/18/2020 Negative     Specific Gravity, UA 05/18/2020 1.009     Blood, Urine 05/18/2020 Negative     pH, UA 05/18/2020 7.0     Protein, UA 05/18/2020 Negative     Urobilinogen, Urine 05/18/2020 0.2     Nitrite, Urine 05/18/2020 Negative     Leukocyte Esterase, Urine 05/18/2020 SMALL*    Cholesterol, Total 05/18/2020 208*    Triglycerides 05/18/2020 192*    HDL 05/18/2020 84     LDL Calculated 05/18/2020 86     Sodium 05/18/2020 143     Potassium 05/18/2020 Radius total -1.5  5/2020 Lumbar T score -1.7( L4 -2.4)/ hip bilaterally -1.5/ -1.3  BD plan 5/2022      Primary insomnia, refill temazepam she takes 30 mg every evening and has been doing this for years  Lady Osman reviewed  Lady Osman was reviewed  On exam today and as per discussions with the patient today there is no evidence of adverse events such as cognitive impairment, sedation, constipation or falls related to prescribed medications. There is also no evidence of aberrant behavior like lost prescriptions or early refill requests or multiple prescribers for controlled substances.     Patient  was advised NOT to attempt to drive a motor vehichle or operate any heavy machinery within 6 hrs of taking the presribed medication - temazepam    Ricky 06/05/2020  Med Contract 06/08/2020  UDS 45/50/2641   Med Policy 60/56/3599     IBS with predominant constipation  Recent EGD and pH testing was negative for GERD  trial of hyoscyamine for as needed use has helped             Orders Placed This Encounter   Procedures    ELYSSA DIGITAL SCREEN W OR WO CAD BILATERAL    HM PAP SMEAR    CBC Auto Differential    Comprehensive Metabolic Panel    Lipid Panel    Urinalysis    TSH without Reflex    Vitamin D 25 Hydroxy     New Prescriptions    No medications on file         No follow-ups on file. There are no Patient Instructions on file for this visit. EMR Dragon/transcription disclaimer:Significant part of this  encounter note is electronic transcription/translationof spoken language to printed text. The electronic translation of spoken language may be erroneous, or at times, nonsensical words or phrases may be inadvertently transcribed.  Although I have reviewed the note for sucherrors, some may still exist.

## 2020-06-08 NOTE — LETTER
MEDICATION AGREEMENT     [facility]  [birth year]30      For certain conditions, multiple classes of medications may be used to help better manage your symptoms, and to improve your ability to function at home, work and in social settings. However, these medications do have risks, which will be discussed with you, including addiction and dependency. The following prescribed medications need frequent monitoring and will require you to partner and assist in your healthcare. Medication  Dose, instructions and quantity as indicated on current prescription bottle Diagnosis/Reason(s) for Taking Category     Temazepam   30mg                            Benefits and goals of Controlled Substance Medications: There are two potential goals for your treatment: (1) decreased pain and suffering (2) improved daily life functions. There are many possible treatments for your chronic condition(s), and, in addition to controlled substance medications, we will try alternatives such as physical therapy, yoga, massage, home daily exercise, meditation, relaxation techniques, injections, chiropractic manipulations, surgery, cognitive therapy, hypnosis and many medications that are not habit-forming. Use of controlled substance medications may be helpful, but they are unlikely to resolve all of your symptoms or restore all function. Risks of Controlled Substance Medications:    Opioid pain medications: These medications can lead to problems such as addiction/dependence, sedation, lightheadedness/dizziness, memory issues, falls, constipation, nausea, or vomiting. They may also impair the ability to drive or operate machinery. Additionally, these medications may lower testosterone levels, leading to loss of bone strength, stamina and sex drive.   They may cause problems with breathing, sleep apnea and reduced coughing, which are especially dangerous for patients with lung disease. Overdose or dangerous interactions with alcohol and other medications may occur, leading to death. Hyperalgesia may develop, in which patients receiving opioids for the treatment of pain may actually become more sensitive to certain painful stimuli, and in some cases, experience pain from ordinarily non-painful stimuli. Women between the ages of 14-53 who could become pregnant should carefully weigh the risks and benefits of opioids with their physicians, as these medications increase the risk of pregnancy complications, including miscarriage,  delivery and stillbirth. It is also possible for babies to be born addicted to opioids. Opioid dependence withdrawal symptoms may include; feelings of uneasiness, increased pain, irritability, belly pain, diarrhea, sweats and goose-flesh. Benzodiazepines and non-benzodiazepine sleep medications: These medications can lead to problems such as addiction/dependence, sedation, fatigue, lightheadedness, dizziness, incoordination, falls, depression, hallucinations, and impaired judgment, memory and concentration. The ability to drive and operate machinery may also be affected. Abnormal sleep-related behaviors have been reported, including sleep walking, driving, making telephone calls, eating, or having sex while not fully awake. These medications can suppress breathing and worsen sleep apnea, particularly when combined with alcohol or other sedating medications, potentially leading to death. Dependence withdrawal symptoms may include tremors, anxiety, hallucinations and seizures. Stimulants:  Common adverse effects include addiction/dependence, increased blood pressure and heart rate, decreased appetite, nausea, involuntary weight loss, insomnia, irritability, and headaches.   These risks may increase when these medications are combined with other stimulants, such as caffeine pills or energy drinks, certain weight loss

## 2020-06-10 NOTE — PROGRESS NOTES
0730- Report received from [facility]. Patient resting quietly in bed on bipap, eyes closed. Vital signs stable. 0900- patient still resting on bipap with eyes closed. Family at bedside. Vital signs remain stable. Will attempt to rouse patient for AM meds. 0945- patient taken off bipap, able to follow commands to take sips of water. Attempted to give two pills, patient then unable to follow commands to take sips of water. Pills removed from mouth, will attempt again later. AM care provided, gomes care and bath wipes/chlorhexadine wipes done. Gown changed. Patient turned and repositioned for comfort. 1000- Dr Marilia Vidal at bedside for eval. Updated on events this AM. Ativan order discontinued. Awaiting nephrology to determine if patient will receive treatment today. 1100- patient continues resting quietly. Family at bedside. 36- Dr Eduardo Delaney at bedside for eval. No new orders at this time. Plan for HD tomorrow after tunneled line placement. 1205- patient continues to intermittently resting quietly vs stating that she needs to urinate. Frequent reminders given that she has a catheter in place. Family member at bedside states patient's daughter/POA requesting to speak with Dr Marilia Vidal, message sent to MD to notify. Patient now able to take meds from AM, but did require frequent prompts to take sips of water. Will not pursue feeding patient at this time. 1345- patient agitated trying to pull of gown, BP cuff, leads. States repeatedly that she needs to urinate despite frequent reminders that she has a catheter in place. Patient only oriented to self, could not tell me her age, where she was or why she was here. Noted to have increased WOB. Placed back on bipap for now. Message sent to Dr Marilia Vidal to notify. 1420- Discussed case with Dr Marilia Vidal, will order ABG now to determine if patient is hypercapneic or still possibly under effect of ativan.      1440- RT Sraah at bedside, ABG drawn from right radial. Dr Geetha Daily Chief Complaint   Patient presents with    3 Month Follow-Up     History of presenting illness:  Debora King is a50 y.o. female who presents today for follow up on her chronic medical conditions as noted below.     Generalized anxiety disorder     Doing well on decreased dose Pristiq 50 daily  He has been under a significant amount of stress related to family issues-  Her father now at assisted living at [facility] ( Parkinsonism)  Also stress associated with her grandson- dx with autism ( lives in [address] [place] [address] Dr martinez/ ++ gene mutation found)     Primary insomnia  Needs meds refills     Gastroesophageal reflux disease without esophagitis  Has seen GI and had endoscopy  Diagnosed with irritable bowel syndrome/no acid reflux was seen and Protonix was discontinued by GI       Patient Active Problem List    Diagnosis Date Noted    Exposure to influenza 08/29/2019    Status post dilatation of esophageal stricture 04/22/2019    Primary insomnia 07/17/2018    Osteopenia of multiple sites 07/17/2018     Overview Note:     3/2018  * lumbar spine average T score is -1.45, she does have T score of -2.2 at L4  * Left hip T score -1.8, neck -1.9,troch -2.1  * Right hip total -1.6, neck -1.9,troch -1.8  * Radius total -1.5        Orthostatic dizziness 07/17/2018    Generalized anxiety disorder 04/17/2018    Menopause ovarian failure 04/17/2018     Past Medical History:   Diagnosis Date    Allergic rhinitis     Anxiety     Arthritis     Osetopenia    Irritable bowel syndrome     Irritable bowel syndrome (IBS)     Sinus infection       Past Surgical History:   Procedure Laterality Date    BLADDER SURGERY  2013    vaginal tape    COLONOSCOPY  10/26/2015    Dr Shamir Haynes AP, active colitis    HYSTERECTOMY      one ovary left    SINUS SURGERY      TONSILLECTOMY      UPPER GASTROINTESTINAL ENDOSCOPY  10/16/2015    Dr Kwaku Dillard-w/balloon dil-18mm- Hiatal hernia, GEJ stricture, Colon Polyps Neg Hx        Review of Systems   Constitutional: Negative for chills, fatigue and fever. HENT: Negative for congestion, ear pain, nosebleeds, postnasal drip, sinus pressure, sore throat, trouble swallowing and voice change. Eyes: Negative for pain, redness and visual disturbance. Respiratory: Negative for cough, chest tightness and wheezing. Cardiovascular: Negative for chest pain, palpitations and leg swelling. Gastrointestinal: Positive for abdominal distention and constipation. Negative for abdominal pain, blood in stool, diarrhea, nausea and vomiting. Endocrine: Negative for polydipsia and polyuria. Genitourinary: Negative for dysuria, enuresis, flank pain, frequency and urgency. Musculoskeletal: Negative. Negative for arthralgias, gait problem and joint swelling. Skin: Negative for color change and rash. Neurological: Negative for dizziness, tremors, syncope, facial asymmetry, speech difficulty, weakness, numbness and headaches. Psychiatric/Behavioral: Positive for sleep disturbance. Negative for agitation, behavioral problems, confusion and suicidal ideas. The patient is not nervous/anxious. Vitals:    02/18/20 0906   BP: 122/70   Site: Left Upper Arm   Position: Sitting   Cuff Size: Large Adult   Pulse: 90   Resp: 18   SpO2: 99%   Weight: 136 lb (61.7 kg)   Height: 5' 6\" (1.676 m)     Body mass index is 21.95 kg/m². Physical Exam  Constitutional:       Appearance: She is well-developed. HENT:      Right Ear: External ear normal.      Left Ear: External ear normal.      Mouth/Throat:      Pharynx: No oropharyngeal exudate. Eyes:      Conjunctiva/sclera: Conjunctivae normal.      Pupils: Pupils are equal, round, and reactive to light. Neck:      Musculoskeletal: Neck supple. Thyroid: No thyromegaly. Vascular: No JVD. Cardiovascular:      Rate and Rhythm: Normal rate. Heart sounds: Normal heart sounds. No murmur.    Pulmonary:      Effort: No respiratory distress. Breath sounds: Normal breath sounds. No wheezing or rales. Chest:      Chest wall: No tenderness. Abdominal:      General: Bowel sounds are normal.      Palpations: Abdomen is soft. Musculoskeletal: Normal range of motion. Lymphadenopathy:      Cervical: No cervical adenopathy. Skin:     General: Skin is warm. Findings: No rash. Neurological:      Mental Status: She is oriented to person, place, and time. Lab Review   No visits with results within 2 Month(s) from this visit. Latest known visit with results is:   Office Visit on 12/16/2019   Component Date Value    Strep A Ag 12/16/2019 Positive*    Influenza A Ab 12/16/2019 negative     Influenza B Ab 12/16/2019 negative            ASSESSMENT/PLAN:      Generalized anxiety disorder- refill Pristiq 50 , seems to be doing well      Vit D level now better 55 ( 27)  ++ Osteopenia          Osteopenia of multiple sites 07/17/2018     3/2018  * lumbar spine average T score is -1.45, she does have T score of -2.2 at L4  * Left hip T score -1.8, neck -1.9,troch -2.1  * Right hip total -1.6, neck -1.9,troch -1.8  * Radius total -1.5         Vit D - cont 2000 iu daily  BD plan 5/2020      Primary insomnia, refill temazepam she takes 30 mg every evening and has been doing this for years  Wing Rucker reviewed  Wing Maudlin was reviewed  On exam today and as per discussions with the patient today there is no evidence of adverse events such as cognitive impairment, sedation, constipation or falls related to prescribed medications.  There is also no evidence of aberrant behavior like lost prescriptions or early refill requests or multiple prescribers for controlled substances.     Patient  was advised NOT to attempt to drive a motor vehichle or operate any heavy machinery within 6 hrs of taking the presribed medication - temazepam       IBS with predominant constipation  Recent EGD and pH testing was negative for GERD  Suggest trial of

## 2020-07-16 ENCOUNTER — TELEPHONE (OUTPATIENT)
Dept: INTERNAL MEDICINE | Age: 55
End: 2020-07-16

## 2020-07-16 ENCOUNTER — TELEMEDICINE (OUTPATIENT)
Dept: INTERNAL MEDICINE | Age: 55
End: 2020-07-16
Payer: COMMERCIAL

## 2020-07-16 PROCEDURE — 99213 OFFICE O/P EST LOW 20 MIN: CPT | Performed by: INTERNAL MEDICINE

## 2020-07-16 RX ORDER — PREDNISONE 10 MG/1
10 TABLET ORAL 2 TIMES DAILY
Qty: 7 TABLET | Refills: 0 | Status: SHIPPED | OUTPATIENT
Start: 2020-07-16 | End: 2020-07-19

## 2020-07-16 RX ORDER — LEVOFLOXACIN 500 MG/1
500 TABLET, FILM COATED ORAL DAILY
Qty: 7 TABLET | Refills: 0 | Status: SHIPPED | OUTPATIENT
Start: 2020-07-16 | End: 2020-07-23

## 2020-07-16 RX ORDER — CHLORHEXIDINE GLUCONATE 0.12 MG/ML
RINSE ORAL
Qty: 420 ML | Refills: 0 | Status: SHIPPED
Start: 2020-07-16 | End: 2020-12-10

## 2020-07-16 ASSESSMENT — PATIENT HEALTH QUESTIONNAIRE - PHQ9
2. FEELING DOWN, DEPRESSED OR HOPELESS: 0
SUM OF ALL RESPONSES TO PHQ QUESTIONS 1-9: 0
1. LITTLE INTEREST OR PLEASURE IN DOING THINGS: 0
SUM OF ALL RESPONSES TO PHQ QUESTIONS 1-9: 0
SUM OF ALL RESPONSES TO PHQ9 QUESTIONS 1 & 2: 0

## 2020-07-16 ASSESSMENT — ENCOUNTER SYMPTOMS
CONSTIPATION: 0
SORE THROAT: 0
COUGH: 0
SINUS PRESSURE: 1
SINUS PAIN: 1
ABDOMINAL PAIN: 0
WHEEZING: 0
CHEST TIGHTNESS: 0

## 2020-07-16 NOTE — PROGRESS NOTES
Chief Complaint   Patient presents with    Facial Swelling     Facial pain, left facial congestion, yellow drainage thick.  Other     Gums have been swollen for a couple of weeks, and bleeding alot      History of presenting illness:  Karie Hightower is a50 y.o. female     TELEHEALTH EVALUATION -- Audio/Visual (During [facility])  Patient location-home  Pursuant to the emergency declaration under the [address] [place] [address] authority and the [employer] Act, this Virtual  Visit was conducted, with patient's consent, to reduce the patient's risk of exposure to COVID-19 and provide continuity of care for an established patient. Services were provided through a video synchronous discussion virtually to substitute for in-person clinic visit.     Reason for VV:      SX last few weeks  Now worse  Sinus pressure left maxillaray area  ALso- bleeding gums  Left upper teeth  ++ postnasal drip  No coughing no sore throat    Patient Active Problem List    Diagnosis Date Noted    Exposure to influenza 08/29/2019    Status post dilatation of esophageal stricture 04/22/2019    Primary insomnia 07/17/2018    Osteopenia of multiple sites 07/17/2018     Overview Note:     3/2018  * lumbar spine average T score is -1.45, she does have T score of -2.2 at L4  * Left hip T score -1.8, neck -1.9,troch -2.1  * Right hip total -1.6, neck -1.9,troch -1.8  * Radius total -1.5  5/2020 Lumbar T score -1.7( L4 -2.4)/ hip bilaterally -1.5/ -1.3        Orthostatic dizziness 07/17/2018    Generalized anxiety disorder 04/17/2018    Menopause ovarian failure 04/17/2018     Past Medical History:   Diagnosis Date    Allergic rhinitis     Anxiety     Arthritis     Osetopenia    Irritable bowel syndrome     Irritable bowel syndrome (IBS)     Sinus infection       Past Surgical History:   Procedure Laterality Date    BLADDER SURGERY  2013    vaginal tape    COLONOSCOPY  10/26/2015    Dr Jose DOYLE, active colitis    HYSTERECTOMY      one ovary left    SINUS SURGERY      TONSILLECTOMY      UPPER GASTROINTESTINAL ENDOSCOPY  10/16/2015    Dr Lena Dillard-w/balloon [id number]- Hiatal hernia, GEJ stricture, erythema in mid to distal esophagus-    UPPER GASTROINTESTINAL ENDOSCOPY  08/23/2017    Dr Akosua An-w/balloon [id number] hernia, normal path    UPPER GASTROINTESTINAL ENDOSCOPY  03/15/2019    Dr Lena Dillard-w/balloon [id number] hernia, GEJ stricture, reflux    UPPER GASTROINTESTINAL ENDOSCOPY N/A 1/27/2020    Dr Marcos Villalba, neg for GERD    UPPER GASTROINTESTINAL ENDOSCOPY N/A 1/27/2020    Dr Marcos Villalba, neg for GERD    WISDOM TOOTH EXTRACTION       Current Outpatient Medications   Medication Sig Dispense Refill    predniSONE (DELTASONE) 10 MG tablet Take 1 tablet by mouth 2 times daily for 3 days 7 tablet 0    levoFLOXacin (LEVAQUIN) 500 MG tablet Take 1 tablet by mouth daily for 7 days 7 tablet 0    chlorhexidine (PERIDEX) 0.12 % solution Swish for 30 seconds with 15 mLafter toothbrushing, then expectorate; repeat twice daily -morning and evening 420 mL 0    temazepam (RESTORIL) 30 MG capsule Take 1 capsule by mouth nightly as needed for anxiety or sleep.  30 capsule 2    hyoscyamine (OSCIMIN) 125 MCG TBDP dispersible tablet Take 2 tablets by mouth 3 times daily as needed (bloating) 40 tablet 0    desvenlafaxine succinate (PRISTIQ) 50 MG TB24 extended release tablet Take 1 tablet by mouth daily 30 tablet 3    Cholecalciferol (VITAMIN D) 2000 units CAPS capsule Take by mouth      cetirizine (ZYRTEC) 10 MG tablet Take 1 tablet by mouth daily 90 tablet 3    topiramate (TOPAMAX) 25 MG tablet Take 25 mg by mouth daily       Black Cohosh 540 MG CAPS Take 540 mg by mouth      fluticasone (FLONASE) 50 MCG/ACT nasal spray 2 sprays      estradiol (ESTRACE) 2 MG tablet Take 2 mg by mouth daily      polyethylene glycol (GLYCOLAX) powder Take 1 g by mouth as needed       No current facility-administered medications for this visit. Allergies   Allergen Reactions    Iohexol Rash     This is IV Contrast    Sulfa Antibiotics Rash     Social History     Tobacco Use    Smoking status: Never Smoker    Smokeless tobacco: Never Used   Substance Use Topics    Alcohol use: Yes     Comment: occ      Family History   Problem Relation Age of Onset    Cancer Father         malt    Parkinsonism Father     Cancer Mother         lung    Lung Cancer Mother     Colon Cancer Neg Hx     Colon Polyps Neg Hx        Review of Systems   Constitutional: Positive for fatigue. Negative for chills and fever. HENT: Positive for congestion, postnasal drip, sinus pressure and sinus pain. Negative for ear pain, nosebleeds and sore throat. Respiratory: Negative for cough, chest tightness and wheezing. Cardiovascular: Negative for chest pain, palpitations and leg swelling. Gastrointestinal: Negative for abdominal pain and constipation. Genitourinary: Negative for dysuria and urgency. Musculoskeletal: Negative. Negative for arthralgias. Skin: Negative for rash. Neurological: Negative for dizziness and headaches. Psychiatric/Behavioral: Negative. There were no vitals filed for this visit. There is no height or weight on file to calculate BMI. No flowsheet data found.      Physical Exam  PHYSICAL EXAMINATION:  [ INSTRUCTIONS:  \"[x]\" Indicates a positive item  \"[]\" Indicates a negative item  -- DELETE ALL ITEMS NOT EXAMINED]  [x] Alert  [x] Oriented to person/place/time    [x] No apparent distress  [] Toxic appearing    [] Face flushed appearing [] Sclera clear  [] Lips are cyanotic      [x] Breathing appears normal  [] Appears tachypneic      [] Rash on visible skin    [x] Cranial Nerves II-XII grossly intact [x] Motor grossly intact in visible upper extremities    [x] Motor grossly intact in visible lower extremities    [x] Normal Mood  [] Anxious appearing    [] Depressed appearing  [] Confused appearing      [] Poor short term memory  [] Poor long term memory    [] OTHER:      Due to this being a TeleHealth encounter, evaluation of the following organ systems is limited: Vitals/Constitutional/EENT/Resp/CV/GI//MS/Neuro/Skin/Heme-Lymph-Imm.     Lab Review   Orders Only on 05/18/2020   Component Date Value    Amphetamine Screen, Urine 05/18/2020 Negative     Barbiturate Screen, Ur 05/18/2020 Negative     Benzodiazepine Screen, U* 05/18/2020 Negative     Cannabinoid Scrn, Ur 05/18/2020 Negative     Cocaine Metabolite Scree* 05/18/2020 Negative     Opiate Scrn, Ur 05/18/2020 Negative     Drug Screen Comment: 05/18/2020 see below     Vit D, 25-Hydroxy 05/18/2020 62.2     TSH 05/18/2020 1.410     Color, UA 05/18/2020 YELLOW     Clarity, UA 05/18/2020 Clear     Glucose, Ur 05/18/2020 Negative     Bilirubin Urine 05/18/2020 Negative     Ketones, Urine 05/18/2020 Negative     Specific Gravity, UA 05/18/2020 1.009     Blood, Urine 05/18/2020 Negative     pH, UA 05/18/2020 7.0     Protein, UA 05/18/2020 Negative     Urobilinogen, Urine 05/18/2020 0.2     Nitrite, Urine 05/18/2020 Negative     Leukocyte Esterase, Urine 05/18/2020 SMALL*    Cholesterol, Total 05/18/2020 208*    Triglycerides 05/18/2020 192*    HDL 05/18/2020 84     LDL Calculated 05/18/2020 86     Sodium 05/18/2020 143     Potassium 05/18/2020 4.6     Chloride 05/18/2020 103     CO2 05/18/2020 26     Anion Gap 05/18/2020 14     Glucose 05/18/2020 104     BUN 05/18/2020 14     CREATININE 05/18/2020 0.6     GFR Non- 05/18/2020 >60     Calcium 05/18/2020 9.6     Total Protein 05/18/2020 7.3     Alb 05/18/2020 4.1     Total Bilirubin 05/18/2020 0.4     Alkaline Phosphatase 05/18/2020 79     ALT 05/18/2020 25  AST 05/18/2020 27     WBC 05/18/2020 7.3     RBC 05/18/2020 4.43     Hemoglobin 05/18/2020 12.5     Hematocrit 05/18/2020 39.8     MCV 05/18/2020 89.8     MCH 05/18/2020 28.2     MCHC 05/18/2020 31.4*    RDW 05/18/2020 15.3*    Platelets 18/62/5796 180     MPV 05/18/2020 12.8*    Neutrophils % 05/18/2020 69.9*    Lymphocytes % 05/18/2020 23.8     Monocytes % 05/18/2020 5.3     Eosinophils % 05/18/2020 0.5     Basophils % 05/18/2020 0.4     Neutrophils Absolute 05/18/2020 5.1     Immature Granulocytes # 05/18/2020 0.0     Lymphocytes Absolute 05/18/2020 1.7     Monocytes Absolute 05/18/2020 0.40     Eosinophils Absolute 05/18/2020 0.00     Basophils Absolute 05/18/2020 0.00     Hep C Ab Interp 05/18/2020 Non-Reactive     Rapid HIV 1&2 05/18/2020 Non-reactive     HIV-1 P24 Ag 05/18/2020 Non-reactive     Bacteria, UA 05/18/2020 NEGATIVE*    Hyaline Casts, UA 05/18/2020 1     WBC, UA 05/18/2020 1     RBC, UA 05/18/2020 0     Epithelial Cells, UA 05/18/2020 1            ASSESSMENT/PLAN:  Acute non-recurrent maxillary sinusitis    Sinus pressure    Sinus pain    Possible periodontitis    RX  -     predniSONE (DELTASONE) 10 MG tablet; Take 1 tablet by mouth 2 times daily for 3 days  -     levoFLOXacin (LEVAQUIN) 500 MG tablet;  Take 1 tablet by mouth daily for 7 days  -     chlorhexidine (PERIDEX) 0.12 % solution; Swish for 30 seconds with 15 mLafter toothbrushing, then expectorate; repeat twice daily -morning and evening    Take OTC Xyzal 5 mg daily and use Flonase no spray  Needs to call and make appointment with her dentist regarding problems with her gums/patient complaining of bleeding gums right upper teeth during our video visit today  I have explained to her that this needs to be evaluated in person by her dentist  She is going to make appointment    If any of her other problems does not improve/resolve she would need to call us for follow-up          No orders of the defined types were placed in this encounter. New Prescriptions    CHLORHEXIDINE (PERIDEX) 0.12 % SOLUTION    Swish for 30 seconds with 15 mLafter toothbrushing, then expectorate; repeat twice daily -morning and evening    LEVOFLOXACIN (LEVAQUIN) 500 MG TABLET    Take 1 tablet by mouth daily for 7 days    PREDNISONE (DELTASONE) 10 MG TABLET    Take 1 tablet by mouth 2 times daily for 3 days         No follow-ups on file. There are no Patient Instructions on file for this visit. EMR Dragon/transcription disclaimer:Significant part of this  encounter note is electronic transcription/translationof spoken language to printed text. The electronic translation of spoken language may be erroneous, or at times, nonsensical words or phrases may be inadvertently transcribed.  Although I have reviewed the note for sucherrors, some may still exist.

## 2020-07-16 NOTE — TELEPHONE ENCOUNTER
Pt c/o sore throat, sinus drainage, and for several weeks her gums irritated/bleeding on left side when she brushes or flosses. Denies fever.  States she had sinus surgery 2016  [facility]

## 2020-09-08 ENCOUNTER — OFFICE VISIT (OUTPATIENT)
Dept: INTERNAL MEDICINE | Age: 55
End: 2020-09-08
Payer: COMMERCIAL

## 2020-09-08 VITALS
DIASTOLIC BLOOD PRESSURE: 68 MMHG | HEART RATE: 67 BPM | SYSTOLIC BLOOD PRESSURE: 100 MMHG | WEIGHT: 137 LBS | RESPIRATION RATE: 18 BRPM | BODY MASS INDEX: 22.02 KG/M2 | HEIGHT: 66 IN | OXYGEN SATURATION: 97 %

## 2020-09-08 PROCEDURE — 99213 OFFICE O/P EST LOW 20 MIN: CPT | Performed by: INTERNAL MEDICINE

## 2020-09-08 RX ORDER — TEMAZEPAM 30 MG/1
CAPSULE ORAL
Qty: 30 CAPSULE | Refills: 2 | Status: SHIPPED | OUTPATIENT
Start: 2020-09-08 | End: 2020-12-08

## 2020-09-08 RX ORDER — DESVENLAFAXINE 50 MG/1
50 TABLET, EXTENDED RELEASE ORAL DAILY
Qty: 30 TABLET | Refills: 3 | Status: SHIPPED | OUTPATIENT
Start: 2020-09-08 | End: 2020-12-08 | Stop reason: SDUPTHER

## 2020-09-08 ASSESSMENT — ENCOUNTER SYMPTOMS
WHEEZING: 0
CONSTIPATION: 0
CHEST TIGHTNESS: 0
ABDOMINAL PAIN: 0
COUGH: 0
SORE THROAT: 0

## 2020-09-08 ASSESSMENT — PATIENT HEALTH QUESTIONNAIRE - PHQ9
SUM OF ALL RESPONSES TO PHQ QUESTIONS 1-9: 0
SUM OF ALL RESPONSES TO PHQ9 QUESTIONS 1 & 2: 0
SUM OF ALL RESPONSES TO PHQ QUESTIONS 1-9: 0
2. FEELING DOWN, DEPRESSED OR HOPELESS: 0
1. LITTLE INTEREST OR PLEASURE IN DOING THINGS: 0

## 2020-09-08 NOTE — PROGRESS NOTES
Chief Complaint   Patient presents with    3 Month Follow-Up     History of presenting illness:  Amber Olson is a50 y.o. female who presents today for follow up on her chronic medical conditions as noted below.     Generalized anxiety disorder  Doing well on decreased dose Pristiq 50 daily  He has been under a significant amount of stress related to family issues-  Her father now at assisted living at [facility] ( Parkinsonism)  Also stress associated with her grandson- dx with autism ( lives in [facility] Dr martinez/ ++ gene mutation found)     Primary insomnia  Needs meds refills     Gastroesophageal reflux disease without esophagitis  Has seen GI and had endoscopy  Diagnosed with irritable bowel syndrome/no acid reflux was seen and Protonix was discontinued by GI    Patient Active Problem List    Diagnosis Date Noted    Exposure to influenza 08/29/2019    Status post dilatation of esophageal stricture 04/22/2019    Primary insomnia 07/17/2018    Osteopenia of multiple sites 07/17/2018     Overview Note:     3/2018  * lumbar spine average T score is -1.45, she does have T score of -2.2 at L4  * Left hip T score -1.8, neck -1.9,troch -2.1  * Right hip total -1.6, neck -1.9,troch -1.8  * Radius total -1.5  5/2020 Lumbar T score -1.7( L4 -2.4)/ hip bilaterally -1.5/ -1.3        Orthostatic dizziness 07/17/2018    Generalized anxiety disorder 04/17/2018    Menopause ovarian failure 04/17/2018     Past Medical History:   Diagnosis Date    Allergic rhinitis     Anxiety     Arthritis     Osetopenia    Irritable bowel syndrome     Irritable bowel syndrome (IBS)     Sinus infection       Past Surgical History:   Procedure Laterality Date    BLADDER SURGERY  2013    vaginal tape    COLONOSCOPY  10/26/2015    Dr Misa DOYLE, active colitis    HYSTERECTOMY      one ovary left    SINUS SURGERY      TONSILLECTOMY      UPPER GASTROINTESTINAL ENDOSCOPY  10/16/2015    Dr Ahmet Jeronimo Yes     Comment: occ      Family History   Problem Relation Age of Onset    Cancer Father         malt    Parkinsonism Father     Cancer Mother         lung    Lung Cancer Mother     Colon Cancer Neg Hx     Colon Polyps Neg Hx        Review of Systems   Constitutional: Positive for fatigue. Negative for chills and fever. HENT: Negative for congestion, ear pain, nosebleeds, postnasal drip and sore throat. Respiratory: Negative for cough, chest tightness and wheezing. Cardiovascular: Negative for chest pain, palpitations and leg swelling. Gastrointestinal: Negative for abdominal pain and constipation. Genitourinary: Negative for dysuria and urgency. Musculoskeletal: Negative. Negative for arthralgias. Skin: Negative for rash. Neurological: Negative for dizziness and headaches. Psychiatric/Behavioral: The patient is nervous/anxious. Vitals:    09/08/20 1505   BP: 100/68   Site: Left Upper Arm   Position: Sitting   Cuff Size: Large Adult   Pulse: 67   Resp: 18   SpO2: 97%   Weight: 137 lb (62.1 kg)   Height: 5' 6\" (1.676 m)     Body mass index is 22.11 kg/m². Physical Exam  Constitutional:       Appearance: She is well-developed. HENT:      Right Ear: External ear normal.      Left Ear: External ear normal.      Mouth/Throat:      Pharynx: No oropharyngeal exudate. Eyes:      Conjunctiva/sclera: Conjunctivae normal.      Pupils: Pupils are equal, round, and reactive to light. Neck:      Musculoskeletal: Neck supple. Thyroid: No thyromegaly. Vascular: No JVD. Cardiovascular:      Rate and Rhythm: Normal rate. Heart sounds: Normal heart sounds. No murmur. Pulmonary:      Effort: No respiratory distress. Breath sounds: Normal breath sounds. No wheezing or rales. Chest:      Chest wall: No tenderness. Abdominal:      General: Bowel sounds are normal.      Palpations: Abdomen is soft. Lymphadenopathy:      Cervical: No cervical adenopathy.    Skin: General: Skin is warm. Findings: No rash. Neurological:      Mental Status: She is oriented to person, place, and time. Lab Review   No visits with results within 2 Month(s) from this visit.    Latest known visit with results is:   Orders Only on 05/18/2020   Component Date Value    Amphetamine Screen, Urine 05/18/2020 Negative     Barbiturate Screen, Ur 05/18/2020 Negative     Benzodiazepine Screen, U* 05/18/2020 Negative     Cannabinoid Scrn, Ur 05/18/2020 Negative     Cocaine Metabolite Scree* 05/18/2020 Negative     Opiate Scrn, Ur 05/18/2020 Negative     Drug Screen Comment: 05/18/2020 see below     Vit D, 25-Hydroxy 05/18/2020 62.2     TSH 05/18/2020 1.410     Color, UA 05/18/2020 YELLOW     Clarity, UA 05/18/2020 Clear     Glucose, Ur 05/18/2020 Negative     Bilirubin Urine 05/18/2020 Negative     Ketones, Urine 05/18/2020 Negative     Specific Gravity, UA 05/18/2020 1.009     Blood, Urine 05/18/2020 Negative     pH, UA 05/18/2020 7.0     Protein, UA 05/18/2020 Negative     Urobilinogen, Urine 05/18/2020 0.2     Nitrite, Urine 05/18/2020 Negative     Leukocyte Esterase, Urine 05/18/2020 SMALL*    Cholesterol, Total 05/18/2020 208*    Triglycerides 05/18/2020 192*    HDL 05/18/2020 84     LDL Calculated 05/18/2020 86     Sodium 05/18/2020 143     Potassium 05/18/2020 4.6     Chloride 05/18/2020 103     CO2 05/18/2020 26     Anion Gap 05/18/2020 14     Glucose 05/18/2020 104     BUN 05/18/2020 14     CREATININE 05/18/2020 0.6     GFR Non- 05/18/2020 >60     Calcium 05/18/2020 9.6     Total Protein 05/18/2020 7.3     Alb 05/18/2020 4.1     Total Bilirubin 05/18/2020 0.4     Alkaline Phosphatase 05/18/2020 79     ALT 05/18/2020 25     AST 05/18/2020 27     WBC 05/18/2020 7.3     RBC 05/18/2020 4.43     Hemoglobin 05/18/2020 12.5     Hematocrit 05/18/2020 39.8     MCV 05/18/2020 89.8     MCH 05/18/2020 28.2     MCHC 05/18/2020 31.4*    RDW 05/18/2020 15.3*    Platelets 00/75/2030 180     MPV 05/18/2020 12.8*    Neutrophils % 05/18/2020 69.9*    Lymphocytes % 05/18/2020 23.8     Monocytes % 05/18/2020 5.3     Eosinophils % 05/18/2020 0.5     Basophils % 05/18/2020 0.4     Neutrophils Absolute 05/18/2020 5.1     Immature Granulocytes # 05/18/2020 0.0     Lymphocytes Absolute 05/18/2020 1.7     Monocytes Absolute 05/18/2020 0.40     Eosinophils Absolute 05/18/2020 0.00     Basophils Absolute 05/18/2020 0.00     Hep C Ab Interp 05/18/2020 Non-Reactive     Rapid HIV 1&2 05/18/2020 Non-reactive     HIV-1 P24 Ag 05/18/2020 Non-reactive     Bacteria, UA 05/18/2020 NEGATIVE*    Hyaline Casts, UA 05/18/2020 1     WBC, UA 05/18/2020 1     RBC, UA 05/18/2020 0     Epithelial Cells, UA 05/18/2020 1      A/P    Generalized anxiety disorder- refill Pristiq 50 , seems to be doing well          Primary insomnia, refill temazepam she takes 30 mg every evening and has been doing this for years  Chelsy Hay reviewed  Chelsy Hay was reviewed  On exam today and as per discussions with the patient today there is no evidence of adverse events such as cognitive impairment, sedation, constipation or falls related to prescribed medications. There is also no evidence of aberrant behavior like lost prescriptions or early refill requests or multiple prescribers for controlled substances.     Patient  was advised NOT to attempt to drive a motor vehichle or operate any heavy machinery within 6 hrs of taking the presribed medication - temazepam    Ricky 09/08/2020  Med Contract 06/08/2020  [facility] [id number]   Med Policy 36/[id number]/9346     IBS with predominant constipation  Recent EGD and pH testing was negative for GERD  trial of hyoscyamine for as needed use has helped           No orders of the defined types were placed in this encounter. New Prescriptions    No medications on file         Return in about 3 months (around 12/8/2020) for Medication check.    There are no Patient Instructions on file for this visit. EMR Dragon/transcription disclaimer:Significant part of this  encounter note is electronic transcription/translationof spoken language to printed text. The electronic translation of spoken language may be erroneous, or at times, nonsensical words or phrases may be inadvertently transcribed.  Although I have reviewed the note for sucherrors, some may still exist.

## 2020-09-11 ENCOUNTER — NURSE ONLY (OUTPATIENT)
Dept: INTERNAL MEDICINE | Age: 55
End: 2020-09-11
Payer: COMMERCIAL

## 2020-09-11 PROCEDURE — 90686 IIV4 VACC NO PRSV 0.5 ML IM: CPT | Performed by: INTERNAL MEDICINE

## 2020-09-11 PROCEDURE — 90471 IMMUNIZATION ADMIN: CPT | Performed by: INTERNAL MEDICINE

## 2020-09-11 NOTE — PROGRESS NOTES
Pt received an injection of Low Dose flu in the right Deltoid in the office today. Pt has signed an Injection consent form for the injection. Pt does not show any signs of reaction to the injection. Pt tolerated the injection well, and is advised to call the office if he/she notices any kind of reaction to happen once the Pt leaves the office.

## 2020-11-05 NOTE — TELEPHONE ENCOUNTER
[facility] called requesting a refill of the below medication which has been pended for you:     Requested Prescriptions     Pending Prescriptions Disp Refills    hyoscyamine (LEVSIN/SL) 125 MCG sublingual tablet [Pharmacy Med Name: HYOSCYAMINE 0.125 MG TAB SL] 40 tablet 5     Sig: TAKE 2 TABLETS BY MOUTH 3 TIMES DAILY AS NEEDED (BLOATING)       Last Appointment Date: 9/8/2020  Next Appointment Date: 12/8/2020    Allergies   Allergen Reactions    Iohexol Rash     This is IV Contrast    Sulfa Antibiotics Rash

## 2020-12-08 ENCOUNTER — OFFICE VISIT (OUTPATIENT)
Dept: INTERNAL MEDICINE | Age: 55
End: 2020-12-08
Payer: COMMERCIAL

## 2020-12-08 VITALS
HEIGHT: 66 IN | DIASTOLIC BLOOD PRESSURE: 70 MMHG | WEIGHT: 136 LBS | BODY MASS INDEX: 21.86 KG/M2 | SYSTOLIC BLOOD PRESSURE: 110 MMHG | OXYGEN SATURATION: 98 % | RESPIRATION RATE: 18 BRPM | HEART RATE: 110 BPM

## 2020-12-08 PROCEDURE — 99213 OFFICE O/P EST LOW 20 MIN: CPT | Performed by: INTERNAL MEDICINE

## 2020-12-08 RX ORDER — AZELASTINE 1 MG/ML
1 SPRAY, METERED NASAL 2 TIMES DAILY
COMMUNITY

## 2020-12-08 RX ORDER — IPRATROPIUM BROMIDE 42 UG/1
2 SPRAY, METERED NASAL 2 TIMES DAILY PRN
COMMUNITY
End: 2021-11-15 | Stop reason: ALTCHOICE

## 2020-12-08 RX ORDER — TEMAZEPAM 30 MG/1
CAPSULE ORAL
Qty: 30 CAPSULE | Refills: 5 | Status: SHIPPED | OUTPATIENT
Start: 2020-12-08 | End: 2021-06-02 | Stop reason: SDUPTHER

## 2020-12-08 RX ORDER — TEMAZEPAM 30 MG/1
CAPSULE ORAL
Qty: 30 CAPSULE | Refills: 2 | Status: SHIPPED | OUTPATIENT
Start: 2020-12-08 | End: 2020-12-08 | Stop reason: SDUPTHER

## 2020-12-08 RX ORDER — DESVENLAFAXINE 50 MG/1
50 TABLET, EXTENDED RELEASE ORAL DAILY
Qty: 30 TABLET | Refills: 5 | Status: SHIPPED | OUTPATIENT
Start: 2020-12-08 | End: 2021-06-02 | Stop reason: SDUPTHER

## 2020-12-08 NOTE — PROGRESS NOTES
Chief Complaint   Patient presents with    3 Month Follow-Up     History of presenting illness:  Petty Hogan is a52 y.o. female who presents today for follow up on her chronic medical conditions as noted below.     Generalized anxiety disorder  Doing well on decreased dose Pristiq 50 daily  He has been under a significant amount of stress related to family issues-  Her father now at assisted living at [facility] ( Parkinsonism)  Also stress associated with her grandson- dx with autism ( lives in [facility] [place] Dr martinez/ ++ gene mutation found)     Primary insomnia  Needs meds refills     Gastroesophageal reflux disease without esophagitis  Has seen GI and had endoscopy  Diagnosed with irritable bowel syndrome/no acid reflux was seen and Protonix was discontinued by GI    Sinus problems  Seen by ent  Now on atroven, Flonase + Astelin + also zyrtec      Patient Active Problem List    Diagnosis Date Noted    Exposure to influenza 08/29/2019    Status post dilatation of esophageal stricture 04/22/2019    Primary insomnia 07/17/2018    Osteopenia of multiple sites 07/17/2018     Overview Note:     3/2018  * lumbar spine average T score is -1.45, she does have T score of -2.2 at L4  * Left hip T score -1.8, neck -1.9,troch -2.1  * Right hip total -1.6, neck -1.9,troch -1.8  * Radius total -1.5  5/2020 Lumbar T score -1.7( L4 -2.4)/ hip bilaterally -1.5/ -1.3        Orthostatic dizziness 07/17/2018    Generalized anxiety disorder 04/17/2018    Menopause ovarian failure 04/17/2018     Past Medical History:   Diagnosis Date    Allergic rhinitis     Anxiety     Arthritis     Osetopenia    Irritable bowel syndrome     Irritable bowel syndrome (IBS)     Sinus infection       Past Surgical History:   Procedure Laterality Date    BLADDER SURGERY  2013    vaginal tape    COLONOSCOPY  10/26/2015    Dr Loretta Carrrea AP, active colitis    HYSTERECTOMY      one ovary left    SINUS SURGERY      TONSILLECTOMY facility-administered medications for this visit. Allergies   Allergen Reactions    Iohexol Rash     This is IV Contrast    Sulfa Antibiotics Rash     Social History     Tobacco Use    Smoking status: Never Smoker    Smokeless tobacco: Never Used   Substance Use Topics    Alcohol use: Yes     Comment: occ      Family History   Problem Relation Age of Onset    Cancer Father         malt    Parkinsonism Father     Cancer Mother         lung    Lung Cancer Mother     Colon Cancer Neg Hx     Colon Polyps Neg Hx        Review of Systems  Vitals:    12/08/20 1401   BP: 110/70   Site: Left Upper Arm   Position: Sitting   Cuff Size: Large Adult   Pulse: 110   Resp: 18   SpO2: 98%   Weight: 136 lb (61.7 kg)   Height: 5' 6\" (1.676 m)     Body mass index is 21.95 kg/m². Physical Exam    Lab Review   No visits with results within 2 Month(s) from this visit.    Latest known visit with results is:   Orders Only on 05/18/2020   Component Date Value    Amphetamine Screen, Urine 05/18/2020 Negative     Barbiturate Screen, Ur 05/18/2020 Negative     Benzodiazepine Screen, U* 05/18/2020 Negative     Cannabinoid Scrn, Ur 05/18/2020 Negative     Cocaine Metabolite Scree* 05/18/2020 Negative     Opiate Scrn, Ur 05/18/2020 Negative     Drug Screen Comment: 05/18/2020 see below     Vit D, 25-Hydroxy 05/18/2020 62.2     TSH 05/18/2020 1.410     Color, UA 05/18/2020 YELLOW     Clarity, UA 05/18/2020 Clear     Glucose, Ur 05/18/2020 Negative     Bilirubin Urine 05/18/2020 Negative     Ketones, Urine 05/18/2020 Negative     Specific Gravity, UA 05/18/2020 1.009     Blood, Urine 05/18/2020 Negative     pH, UA 05/18/2020 7.0     Protein, UA 05/18/2020 Negative     Urobilinogen, Urine 05/18/2020 0.2     Nitrite, Urine 05/18/2020 Negative     Leukocyte Esterase, Urine 05/18/2020 SMALL*    Cholesterol, Total 05/18/2020 208*    Triglycerides 05/18/2020 192*    HDL 05/18/2020 84     LDL Calculated 05/18/2020 86     Sodium 05/18/2020 143     Potassium 05/18/2020 4.6     Chloride 05/18/2020 103     CO2 05/18/2020 26     Anion Gap 05/18/2020 14     Glucose 05/18/2020 104     BUN 05/18/2020 14     CREATININE 05/18/2020 0.6     GFR Non- 05/18/2020 >60     Calcium 05/18/2020 9.6     Total Protein 05/18/2020 7.3     Alb 05/18/2020 4.1     Total Bilirubin 05/18/2020 0.4     Alkaline Phosphatase 05/18/2020 79     ALT 05/18/2020 25     AST 05/18/2020 27     WBC 05/18/2020 7.3     RBC 05/18/2020 4.43     Hemoglobin 05/18/2020 12.5     Hematocrit 05/18/2020 39.8     MCV 05/18/2020 89.8     MCH 05/18/2020 28.2     MCHC 05/18/2020 31.4*    RDW 05/18/2020 15.3*    Platelets 20/30/1739 180     MPV 05/18/2020 12.8*    Neutrophils % 05/18/2020 69.9*    Lymphocytes % 05/18/2020 23.8     Monocytes % 05/18/2020 5.3     Eosinophils % 05/18/2020 0.5     Basophils % 05/18/2020 0.4     Neutrophils Absolute 05/18/2020 5.1     Immature Granulocytes # 05/18/2020 0.0     Lymphocytes Absolute 05/18/2020 1.7     Monocytes Absolute 05/18/2020 0.40     Eosinophils Absolute 05/18/2020 0.00     Basophils Absolute 05/18/2020 0.00     Hep C Ab Interp 05/18/2020 Non-Reactive     Rapid HIV 1&2 05/18/2020 Non-reactive     HIV-1 P24 Ag 05/18/2020 Non-reactive     Bacteria, UA 05/18/2020 NEGATIVE*    Hyaline Casts, UA 05/18/2020 1     WBC, UA 05/18/2020 1     RBC, UA 05/18/2020 0     Epithelial Cells, UA 05/18/2020 1            ASSESSMENT/PLAN:    Generalized anxiety disorder- refill Pristiq 50 , seems to be doing well      Vit D level now better 62(55 )( 27)  Cont 2000 iu daily  ++ Osteopenia  3/2018  * lumbar spine average T score is -1.45, she does have T score of -2.2 at L4  * Left hip T score -1.8, neck -1.9,troch -2.1  * Right hip total -1.6, neck -1.9,troch -1.8  * Radius total -1.5  5/2020 Lumbar T score -1.7( L4 -2.4)/ hip bilaterally -1.5/ -1.3  BD plan 5/2022      Primary insomnia, refill temazepam she takes 30 mg every evening and has been doing this for years  Lashell Irwin reviewed  Lashell Irwin was reviewed  On exam today and as per discussions with the patient today there is no evidence of adverse events such as cognitive impairment, sedation, constipation or falls related to prescribed medications. There is also no evidence of aberrant behavior like lost prescriptions or early refill requests or multiple prescribers for controlled substances.     Patient  was advised NOT to attempt to drive a motor vehichle or operate any heavy machinery within 6 hrs of taking the presribed medication - temazepam    Ricky 12/2020  Med Contract 06/08/2020  UDS 40/84/9234   Med Policy 32/01/9292     IBS with predominant constipation  Recent EGD and pH testing was negative for GERD  trial of hyoscyamine for as needed use has helped     Sinus problems  Seen by ent  Now on atroven, Flonase + Astelin + also zyrtec        No orders of the defined types were placed in this encounter. New Prescriptions    No medications on file         No follow-ups on file. There are no Patient Instructions on file for this visit. EMR Dragon/transcription disclaimer:Significant part of this  encounter note is electronic transcription/translationof spoken language to printed text. The electronic translation of spoken language may be erroneous, or at times, nonsensical words or phrases may be inadvertently transcribed.  Although I have reviewed the note for sucherrors, some may still exist.

## 2020-12-10 ENCOUNTER — OFFICE VISIT (OUTPATIENT)
Dept: GASTROENTEROLOGY | Age: 55
End: 2020-12-10
Payer: COMMERCIAL

## 2020-12-10 VITALS
HEIGHT: 66 IN | WEIGHT: 135 LBS | SYSTOLIC BLOOD PRESSURE: 117 MMHG | DIASTOLIC BLOOD PRESSURE: 80 MMHG | HEART RATE: 80 BPM | BODY MASS INDEX: 21.69 KG/M2 | OXYGEN SATURATION: 98 %

## 2020-12-10 PROCEDURE — 99213 OFFICE O/P EST LOW 20 MIN: CPT | Performed by: INTERNAL MEDICINE

## 2020-12-10 ASSESSMENT — ENCOUNTER SYMPTOMS
CONSTIPATION: 1
CHEST TIGHTNESS: 0
CHOKING: 0
DIARRHEA: 0
ABDOMINAL PAIN: 1
BLOOD IN STOOL: 0
NAUSEA: 0
EYE DISCHARGE: 0
EYE REDNESS: 0
ALLERGIC/IMMUNOLOGIC NEGATIVE: 1
COUGH: 0
SHORTNESS OF BREATH: 0
WHEEZING: 0
RECTAL PAIN: 0
VOMITING: 0
ANAL BLEEDING: 0
VOICE CHANGE: 0
BACK PAIN: 0
EYE ITCHING: 0
TROUBLE SWALLOWING: 0
SORE THROAT: 0
ABDOMINAL DISTENTION: 0

## 2020-12-10 NOTE — PATIENT INSTRUCTIONS
Patient Education        Learning About Colonoscopy  What is a colonoscopy? A colonoscopy is a test (also called a procedure) that lets a doctor look inside your large intestine. The doctor uses a thin, lighted tube called a colonoscope. The doctor uses it to look for small growths called polyps, colon or rectal cancer (colorectal cancer), or other problems like bleeding. During the procedure, the doctor can take samples of tissue. The samples can then be checked for cancer or other conditions. The doctor can also take out polyps. How is a colonoscopy done? This procedure is done in a doctor's office or a clinic or hospital. You will get medicine to help you relax and not feel pain. Some people find that they don't remember having the test because of the medicine. The doctor gently moves the colonoscope, or scope, through the colon. The scope is also a small video camera. It lets the doctor see the colon and take pictures. How do you prepare for the procedure? You need to clean out your colon before the procedure so the doctor can see all of your colon. This process may start a day or two before the test. This depends on which \"colon prep\" your doctor recommends. To clean your colon, you stop eating solid foods and drink only clear liquids. You can have water, tea, coffee, clear juices, clear broths, flavored ice pops, and gelatin (such as Jell-O). Do not drink anything red or purple. The day or night before the procedure, you drink a large amount of a special liquid. This causes loose, frequent stools. You will go to the bathroom a lot. It's very important to drink all of the liquid. If you have problems drinking it, call your doctor. Some people don't go to work or do their usual activities on the day of the prep. Arrange to have someone take you home after the test.  What can you expect after a colonoscopy? Your doctor will tell you when you can eat and do your usual activities.   Drink a lot of fluid after the test to replace the fluids you may have lost during the colon prep. But don't drink alcohol. Your doctor will talk to you about when you'll need your next colonoscopy. The results of your test and your risk for colorectal cancer will help your doctor decide how often you need to be checked. After the test, you may be bloated or have gas pains. You may need to pass gas. If a biopsy was done or a polyp was removed, you may have streaks of blood in your stool (feces) for a few days. If polyps were taken out, your doctor may tell you to avoid taking aspirin and nonsteroidal anti-inflammatory drugs (NSAIDs) for 7 to 14 days. Problems such as heavy rectal bleeding may not occur until several weeks after the test. This isn't common. But it can happen after polyps are removed. Follow-up care is a key part of your treatment and safety. Be sure to make and go to all appointments, and call your doctor if you are having problems. It's also a good idea to know your test results and keep a list of the medicines you take. Where can you learn more? Go to https://admetricks."[employer]. org and sign in to your [employer] account. Enter P991 in the [employer] box to learn more about \"Learning About Colonoscopy. \"     If you do not have an account, please click on the \"Sign Up Now\" link. Current as of: April 29, 2020               Content Version: 12.6  © 7761-0698 [employer]. Care instructions adapted under license by [facility] ([facility]). If you have questions about a medical condition or this instruction, always ask your healthcare professional. [address] any warranty or liability for your use of this information.

## 2020-12-10 NOTE — PROGRESS NOTES
Anna Johnson  Primary Care Provider Abelardo Smallwood MD  Referral Source No ref. provider found    Anna Johnson is a 54 y.o. female  ChiefComplaint:  Chief Complaint   Patient presents with    Colonoscopy   Hx of polyps-she is due for her CRC surveillance exam.    Assessment / Plan:   Diagnosis Orders   1. Hx of adenomatous colonic polyps     2. Chronic constipation     3. Non-ulcer dyspepsia       -Will schedule her for a colonoscopy exam in early Feb 2021 (with Golytely/PlenVu solution or Sutab pill prep) for CRC surveillance with her past hx of adenomatous polyps (her last exam was > 5 years ago in [facility]. I have discussed the benefits, alternatives, and risks (including bleeding, perforation and death)  for pursuing Endoscopy (EGD/Colonscopy/EUS/ERCP) with the patient and they are willing to continue. We also discussed the need for anesthesia, IV access, proper dietary changes, medication changes if necessary, and need for bowel prep (if ordered) prior to their Endoscopic procedure. They are aware they must have someone accompany them to their scheduled procedure to drive them home - they agree to the above and are willing to continue. Plan for anesthesia: MAC    Her NUD and constipation appear to be under good control with a combination of dietary measures, OTC Miralax and prescription Hyoscyamine which she is using sparingly. She should continue that regimen for now. -OP f/u in the GI clinic PRN and she will keep her f/u appointments with her other MDs including PCP as before. I have seen, examined and reviewed this patient medication list, appropriate labs and imaging studies. I reviewed relevant medical records and others physicians notes. I discussed the plans of care with the patient. I answered all the questions to the patients satisfaction.     Also, all of the information input into this note by the MA today including chief complaint, past medical history, past surgical history, malt    Parkinsonism Father     Stomach Cancer Father         maltoma    Cancer Mother         lung    Lung Cancer Mother     Colon Cancer Neg Hx     Colon Polyps Neg Hx       Current Outpatient Medications   Medication Sig Dispense Refill    azelastine (ASTELIN) 0.1 % nasal spray 1 spray by Nasal route 2 times daily Use in each nostril as directed      ipratropium (ATROVENT) 0.06 % nasal spray 2 sprays by Each Nostril route 2 times daily      desvenlafaxine succinate (PRISTIQ) 50 MG TB24 extended release tablet Take 1 tablet by mouth daily 30 tablet 5    temazepam (RESTORIL) 30 MG capsule Take 1 capsule by mouth nightly as needed for anxiety or sleep. 30 capsule 5    hyoscyamine (LEVSIN/SL) 125 MCG sublingual tablet TAKE 2 TABLETS BY MOUTH 3 TIMES DAILY AS NEEDED (BLOATING) 40 tablet 5    Cholecalciferol (VITAMIN D) 2000 units CAPS capsule Take by mouth      cetirizine (ZYRTEC) 10 MG tablet Take 1 tablet by mouth daily 90 tablet 3    topiramate (TOPAMAX) 25 MG tablet Take 25 mg by mouth daily       Black Cohosh 540 MG CAPS Take 540 mg by mouth      fluticasone (FLONASE) 50 MCG/ACT nasal spray 2 sprays      estradiol (ESTRACE) 2 MG tablet Take 2 mg by mouth daily      polyethylene glycol (GLYCOLAX) powder Take 1 g by mouth as needed       No current facility-administered medications for this visit. Allergies   Allergen Reactions    Iohexol Rash     This is IV Contrast    Sulfa Antibiotics Rash        SOCIAL HISTORY:   reports that she has never smoked. She has never used smokeless tobacco. She reports current alcohol use. She reports that she does not use drugs. Review of Systems   Constitutional: Negative. Negative for activity change, appetite change, chills, fatigue, fever and unexpected weight change. HENT: Negative. Negative for mouth sores, nosebleeds, postnasal drip, sneezing, sore throat, trouble swallowing and voice change.     Eyes: Negative for discharge, redness and

## 2020-12-30 ENCOUNTER — APPOINTMENT (OUTPATIENT)
Dept: CT IMAGING | Age: 55
End: 2020-12-30
Payer: COMMERCIAL

## 2020-12-30 ENCOUNTER — APPOINTMENT (OUTPATIENT)
Dept: GENERAL RADIOLOGY | Age: 55
End: 2020-12-30
Payer: COMMERCIAL

## 2020-12-30 ENCOUNTER — HOSPITAL ENCOUNTER (EMERGENCY)
Age: 55
Discharge: HOME | End: 2020-12-30
Payer: COMMERCIAL

## 2020-12-30 VITALS
HEIGHT: 66 IN | BODY MASS INDEX: 21.38 KG/M2 | RESPIRATION RATE: 18 BRPM | DIASTOLIC BLOOD PRESSURE: 65 MMHG | TEMPERATURE: 97.9 F | WEIGHT: 133 LBS | SYSTOLIC BLOOD PRESSURE: 120 MMHG | OXYGEN SATURATION: 100 % | HEART RATE: 71 BPM

## 2020-12-30 LAB
ALBUMIN SERPL-MCNC: 3.8 G/DL (ref 3.5–5.2)
ALP BLD-CCNC: 68 U/L (ref 35–104)
ALT SERPL-CCNC: 14 U/L (ref 5–33)
ANION GAP SERPL CALCULATED.3IONS-SCNC: 9 MMOL/L (ref 7–19)
AST SERPL-CCNC: 20 U/L (ref 5–32)
BACTERIA: ABNORMAL /HPF
BASOPHILS ABSOLUTE: 0 K/UL (ref 0–0.2)
BASOPHILS RELATIVE PERCENT: 0.2 % (ref 0–1)
BILIRUB SERPL-MCNC: <0.2 MG/DL (ref 0.2–1.2)
BILIRUBIN URINE: NEGATIVE
BLOOD, URINE: NEGATIVE
BUN BLDV-MCNC: 10 MG/DL (ref 6–20)
CALCIUM SERPL-MCNC: 8.3 MG/DL (ref 8.6–10)
CHLORIDE BLD-SCNC: 104 MMOL/L (ref 98–111)
CLARITY: CLEAR
CO2: 25 MMOL/L (ref 22–29)
COLOR: YELLOW
CREAT SERPL-MCNC: 0.6 MG/DL (ref 0.5–0.9)
EOSINOPHILS ABSOLUTE: 0 K/UL (ref 0–0.6)
EOSINOPHILS RELATIVE PERCENT: 0.4 % (ref 0–5)
EPITHELIAL CELLS, UA: 2 /HPF (ref 0–5)
GFR AFRICAN AMERICAN: >59
GFR NON-AFRICAN AMERICAN: >60
GLUCOSE BLD-MCNC: 87 MG/DL (ref 74–109)
GLUCOSE URINE: NEGATIVE MG/DL
HCT VFR BLD CALC: 42.9 % (ref 37–47)
HEMOGLOBIN: 13.3 G/DL (ref 12–16)
HYALINE CASTS: 1 /HPF (ref 0–8)
IMMATURE GRANULOCYTES #: 0 K/UL
KETONES, URINE: NEGATIVE MG/DL
LEUKOCYTE ESTERASE, URINE: ABNORMAL
LYMPHOCYTES ABSOLUTE: 1.1 K/UL (ref 1.1–4.5)
LYMPHOCYTES RELATIVE PERCENT: 21.1 % (ref 20–40)
MCH RBC QN AUTO: 27.9 PG (ref 27–31)
MCHC RBC AUTO-ENTMCNC: 31 G/DL (ref 33–37)
MCV RBC AUTO: 90.1 FL (ref 81–99)
MONOCYTES ABSOLUTE: 0.4 K/UL (ref 0–0.9)
MONOCYTES RELATIVE PERCENT: 7.6 % (ref 0–10)
NEUTROPHILS ABSOLUTE: 3.6 K/UL (ref 1.5–7.5)
NEUTROPHILS RELATIVE PERCENT: 70.5 % (ref 50–65)
NITRITE, URINE: NEGATIVE
PDW BLD-RTO: 15.9 % (ref 11.5–14.5)
PH UA: 7 (ref 5–8)
PLATELET # BLD: 118 K/UL (ref 130–400)
PMV BLD AUTO: 12.2 FL (ref 9.4–12.3)
POTASSIUM REFLEX MAGNESIUM: 3.9 MMOL/L (ref 3.5–5)
PROTEIN UA: NEGATIVE MG/DL
RBC # BLD: 4.76 M/UL (ref 4.2–5.4)
RBC UA: 1 /HPF (ref 0–4)
REASON FOR REJECTION: NORMAL
REJECTED TEST: NORMAL
SODIUM BLD-SCNC: 138 MMOL/L (ref 136–145)
SPECIFIC GRAVITY UA: 1 (ref 1–1.03)
TOTAL CK: 50 U/L (ref 26–192)
TOTAL PROTEIN: 6.4 G/DL (ref 6.6–8.7)
TROPONIN: <0.01 NG/ML (ref 0–0.03)
UROBILINOGEN, URINE: 0.2 E.U./DL
WBC # BLD: 5 K/UL (ref 4.8–10.8)
WBC UA: 4 /HPF (ref 0–5)

## 2020-12-30 PROCEDURE — 6370000000 HC RX 637 (ALT 250 FOR IP): Performed by: NURSE PRACTITIONER

## 2020-12-30 PROCEDURE — 36415 COLL VENOUS BLD VENIPUNCTURE: CPT

## 2020-12-30 PROCEDURE — 70450 CT HEAD/BRAIN W/O DYE: CPT

## 2020-12-30 PROCEDURE — 84484 ASSAY OF TROPONIN QUANT: CPT

## 2020-12-30 PROCEDURE — 99283 EMERGENCY DEPT VISIT LOW MDM: CPT

## 2020-12-30 PROCEDURE — 99999 PR OFFICE/OUTPT VISIT,PROCEDURE ONLY: CPT | Performed by: NURSE PRACTITIONER

## 2020-12-30 PROCEDURE — 93005 ELECTROCARDIOGRAM TRACING: CPT | Performed by: NURSE PRACTITIONER

## 2020-12-30 PROCEDURE — 80053 COMPREHEN METABOLIC PANEL: CPT

## 2020-12-30 PROCEDURE — 71045 X-RAY EXAM CHEST 1 VIEW: CPT

## 2020-12-30 PROCEDURE — 81001 URINALYSIS AUTO W/SCOPE: CPT

## 2020-12-30 PROCEDURE — 85025 COMPLETE CBC W/AUTO DIFF WBC: CPT

## 2020-12-30 PROCEDURE — 82550 ASSAY OF CK (CPK): CPT

## 2020-12-30 RX ORDER — ACETAMINOPHEN 325 MG/1
650 TABLET ORAL ONCE
Status: COMPLETED | OUTPATIENT
Start: 2020-12-30 | End: 2020-12-30

## 2020-12-30 RX ADMIN — ACETAMINOPHEN 650 MG: 325 TABLET ORAL at 11:56

## 2020-12-30 ASSESSMENT — ENCOUNTER SYMPTOMS
DIARRHEA: 1
SHORTNESS OF BREATH: 0
COUGH: 1
ABDOMINAL PAIN: 0

## 2020-12-30 ASSESSMENT — PAIN SCALES - GENERAL
PAINLEVEL_OUTOF10: 0
PAINLEVEL_OUTOF10: 2
PAINLEVEL_OUTOF10: 2

## 2020-12-30 ASSESSMENT — PAIN DESCRIPTION - PAIN TYPE: TYPE: ACUTE PAIN

## 2020-12-30 ASSESSMENT — PAIN DESCRIPTION - LOCATION: LOCATION: BACK

## 2020-12-30 NOTE — ED PROVIDER NOTES
[facility]  eMERGENCY dEPARTMENT eNCOUnter      Pt Name: Selina Coles  MRN: [id number]  [place] 1965  Date of evaluation: 12/30/2020  Provider: Hetal Biggs, [address]       Chief Complaint   Patient presents with    Numbness     Pt presents with tingling and slight numbness in both hands while she was in the shower, covid+ last monday          HISTORY OF PRESENT ILLNESS   (Location/Symptom, Timing/Onset,Context/Setting, Quality, Duration, Modifying Factors, Severity)  Note limiting factors. Caryl Ashton a 54 y.o. female who presents to the emergency department for evaluation of numbness. Pt tells me that she has had cough, congestion and pain across right upper chest and back experienced over past 3 days having had positive covid testing 3 days ago. She relates that she has had headache across forehead today. She tells me that she has had generalized weakness today associated with numbness of bilateral hands that developed between 8:30 and 9:00 am. She denies vision changes, speech problems, dizziness, syncope as well as lateralizing numbness/weakness. HPI    Nursing Notes were reviewed. REVIEW OF SYSTEMS    (2-9 systems for level 4, 10 or more for level 5)     Review of Systems   Respiratory: Positive for cough. Negative for shortness of breath. Cardiovascular: Positive for chest pain. Gastrointestinal: Positive for diarrhea. Negative for abdominal pain. Neurological: Positive for weakness (generalized) and numbness (biateral hands/fingers). All other systems reviewed and are negative. A complete review of systems was performed and is negative except as noted above in the HPI.        PAST MEDICAL HISTORY     Past Medical History:   Diagnosis Date    Allergic rhinitis     Anxiety     Arthritis     Osetopenia    Irritable bowel syndrome     Irritable bowel syndrome (IBS)     Sinus infection          SURGICAL HISTORY       Past Surgical History: Procedure Laterality Date    BLADDER SURGERY  2013    vaginal tape    COLONOSCOPY  10/26/2015    Dr Zachariah Mcgee AP, active colitis    HYSTERECTOMY      one ovary left    SINUS SURGERY      TONSILLECTOMY      UPPER GASTROINTESTINAL ENDOSCOPY  10/16/2015    Dr Gasper Dillard-w/balloon dil-18mm- Hiatal hernia, GEJ stricture, erythema in mid to distal esophagus-    UPPER GASTROINTESTINAL ENDOSCOPY  08/23/2017    Dr iJmmy An-w/balloon [id number] hernia, normal path    UPPER GASTROINTESTINAL ENDOSCOPY  03/15/2019    Dr Gasper Dillard-w/balloon [id number] hernia, GEJ stricture, reflux    UPPER GASTROINTESTINAL ENDOSCOPY N/A 1/27/2020    Dr Mirian Hudson, neg for GERD    UPPER GASTROINTESTINAL ENDOSCOPY N/A 1/27/2020    Dr Mirian Hudson, neg for GERD    WISDOM TOOTH EXTRACTION           CURRENT MEDICATIONS       Previous Medications    AZELASTINE (ASTELIN) 0.1 % NASAL SPRAY    1 spray by Nasal route 2 times daily Use in each nostril as directed    BLACK COHOSH 540 MG CAPS    Take 540 mg by mouth    CETIRIZINE (ZYRTEC) 10 MG TABLET    Take 1 tablet by mouth daily    CHOLECALCIFEROL (VITAMIN D) 2000 UNITS CAPS CAPSULE    Take by mouth    DESVENLAFAXINE SUCCINATE (PRISTIQ) 50 MG TB24 EXTENDED RELEASE TABLET    Take 1 tablet by mouth daily    ESTRADIOL (ESTRACE) 2 MG TABLET    Take 2 mg by mouth daily    FLUTICASONE (FLONASE) 50 MCG/ACT NASAL SPRAY    2 sprays    HYOSCYAMINE (LEVSIN/SL) 125 MCG SUBLINGUAL TABLET    TAKE 2 TABLETS BY MOUTH 3 TIMES DAILY AS NEEDED (BLOATING)    IPRATROPIUM (ATROVENT) 0.06 % NASAL SPRAY    2 sprays by Each Nostril route 2 times daily    POLYETHYLENE GLYCOL (GLYCOLAX) POWDER    Take 1 g by mouth as needed    TEMAZEPAM (RESTORIL) 30 MG CAPSULE    Take 1 capsule by mouth nightly as needed for anxiety or sleep.     TOPIRAMATE (TOPAMAX) 25 MG TABLET    Take 25 mg by mouth daily ALLERGIES     Iohexol and Sulfa antibiotics    FAMILY HISTORY       Family History   Problem Relation Age of Onset    Cancer Father         malt    Parkinsonism Father     Stomach Cancer Father         maltoma    Cancer Mother         lung    Lung Cancer Mother     Colon Cancer Neg Hx     Colon Polyps Neg Hx           SOCIAL HISTORY       Social History     Socioeconomic History    Marital status:      Spouse name: None    Number of children: 3    Years of education: None    Highest education level: None   Occupational History    Occupation: Retired    Social Needs    Financial resource strain: None    Food insecurity     Worry: None     Inability: None    Transportation needs     Medical: None     Non-medical: None   Tobacco Use    Smoking status: Never Smoker    Smokeless tobacco: Never Used   Substance and Sexual Activity    Alcohol use: Yes     Comment: occ    Drug use: No    Sexual activity: None   Lifestyle    Physical activity     Days per week: None     Minutes per session: None    Stress: None   Relationships    Social connections     Talks on phone: None     Gets together: None     Attends Restorationist service: None     Active member of club or organization: None     Attends meetings of clubs or organizations: None     Relationship status: None    Intimate partner violence     Fear of current or ex partner: None     Emotionally abused: None     Physically abused: None     Forced sexual activity: None   Other Topics Concern    None   Social History Narrative    He was  but  recently remarried and moved to [place] 11/2017, she has 3 adult children ( 2 adopted)       SCREENINGS             PHYSICAL EXAM    (up to 7 for level 4, 8 or more for level 5)     ED Triage Vitals [12/30/20 1051]   BP Temp Temp Source Pulse Resp SpO2 Height Weight   -- 98 °F (36.7 °C) Oral -- -- -- 5' 6\" (1.676 m) 133 lb (60.3 kg)     Vitals:    12/30/20 1051   BP: 139/78   Pulse: 85 Resp: 12   Temp: 98 °F (36.7 °C)   TempSrc: Oral   SpO2: 100%   Weight: 133 lb (60.3 kg)   Height: 5' 6\" (1.676 m)         Physical Exam  Vitals signs reviewed. Constitutional:       Appearance: Normal appearance. Comments: 54 yr old female using electronic device upon entering room   HENT:      Head: Normocephalic. Right Ear: External ear normal.      Left Ear: External ear normal.   Eyes:      Conjunctiva/sclera: Conjunctivae normal.      Pupils: Pupils are equal, round, and reactive to light. Neck:      Musculoskeletal: Normal range of motion. Comments: No nuchal rigidity  Cardiovascular:      Rate and Rhythm: Normal rate and regular rhythm. Heart sounds: Normal heart sounds. Pulmonary:      Effort: Pulmonary effort is normal.      Breath sounds: Normal breath sounds. Abdominal:      General: Bowel sounds are normal.      Palpations: Abdomen is soft. Musculoskeletal: Normal range of motion. Comments: 5/5 MS equal bilateral upper/lower extremities  Normal sensation to light touch along radial/ulnar/median nerve distribution   Skin:     General: Skin is warm and dry. Neurological:      General: No focal deficit present. Mental Status: She is alert and oriented to person, place, and time. Comments: Normal speech  No facial weakness  No pronator drift  No truncal ataxia         DIAGNOSTIC RESULTS     EKG: All EKG's are interpreted by the Emergency Department Physician who either signs or Co-signs this chart in the absence of acardiologist.    There is a regular rate and rhythm. SR hr 74b/min Normal MA interval and normal P waves. Normal QRS interval. Normal QT interval. No obvious ST elevation or ST depression.        RADIOLOGY:   Non-plain film images such as CT, Ultrasound andMRI are read by the radiologist. Plain radiographic images are visualized and preliminarily interpreted by the emergency physician with the below findings: Interpretation per the Radiologist below, if available at the time of this note:    CT HEAD WO CONTRAST   Final Result   Impression:   No acute intracranial findings. Signed by Dr Gina Magana on 12/30/2020 11:57 AM      XR CHEST PORTABLE   Final Result   No evidence of acute cardiopulmonary process. Signed by Dr Ye Vickers on 12/30/2020 11:36 AM            ED BEDSIDE ULTRASOUND:   Performed by ED Physician - none    LABS:  Labs Reviewed   CBC WITH AUTO DIFFERENTIAL - Abnormal; Notable for the following components:       Result Value    MCHC 31.0 (*)     RDW 15.9 (*)     Platelets 665 (*)     Neutrophils % 70.5 (*)     All other components within normal limits   URINE RT REFLEX TO CULTURE - Abnormal; Notable for the following components:    Leukocyte Esterase, Urine MODERATE (*)     All other components within normal limits   MICROSCOPIC URINALYSIS - Abnormal; Notable for the following components:    Bacteria, UA None Seen (*)     All other components within normal limits   COMPREHENSIVE METABOLIC PANEL W/ REFLEX TO MG FOR LOW K - Abnormal; Notable for the following components:    Calcium 8.3 (*)     Total Protein 6.4 (*)     All other components within normal limits   SPECIMEN REJECTION   TROPONIN   CK       All other labs were within normal range or not returned as of this dictation. RE-ASSESSMENT           EMERGENCY DEPARTMENT COURSE and DIFFERENTIALDIAGNOSIS/MDM:   Vitals:    Vitals:    12/30/20 1051   BP: 139/78   Pulse: 85   Resp: 12   Temp: 98 °F (36.7 °C)   TempSrc: Oral   SpO2: 100%   Weight: 133 lb (60.3 kg)   Height: 5' 6\" (1.676 m)       MDM      CONSULTS:  None    PROCEDURES:  Unless otherwise notedbelow, none     Procedures    FINAL IMPRESSION     1. Paresthesia    2.  Generalized weakness          DISPOSITION/PLAN   DISPOSITION Decision To Discharge 12/30/2020 01:16:42 PM      PATIENT REFERRED TO:  Fantasma Moya MD  [address] [address] [address] [phone] Schedule an appointment as soon as possible for a visit in 3 days  fail to improve      DISCHARGE MEDICATIONS:       Current Discharge Medication List          (Pleasenote that portions of this note were completed with a voice recognition program.  Efforts were made to edit the dictations but occasionally words are mis-transcribed.)              Brett Duran, APRN  12/30/20 [id number]

## 2020-12-31 LAB
EKG P AXIS: 31 DEGREES
EKG P-R INTERVAL: 140 MS
EKG Q-T INTERVAL: 360 MS
EKG QRS DURATION: 96 MS
EKG QTC CALCULATION (BAZETT): 386 MS
EKG T AXIS: 76 DEGREES

## 2021-02-05 ENCOUNTER — OFFICE VISIT (OUTPATIENT)
Age: 56
End: 2021-02-05

## 2021-02-05 VITALS — OXYGEN SATURATION: 100 % | HEART RATE: 70 BPM

## 2021-02-05 DIAGNOSIS — Z11.59 SCREENING FOR VIRAL DISEASE: Primary | ICD-10-CM

## 2021-02-05 PROCEDURE — 99999 PR OFFICE/OUTPT VISIT,PROCEDURE ONLY: CPT | Performed by: NURSE PRACTITIONER

## 2021-02-06 LAB — SARS-COV-2, PCR: DETECTED

## 2021-02-09 ENCOUNTER — ANESTHESIA (OUTPATIENT)
Dept: OPERATING ROOM | Age: 56
End: 2021-02-09

## 2021-02-09 ENCOUNTER — APPOINTMENT (OUTPATIENT)
Dept: OPERATING ROOM | Age: 56
End: 2021-02-09

## 2021-02-09 ENCOUNTER — HOSPITAL ENCOUNTER (OUTPATIENT)
Age: 56
Setting detail: OUTPATIENT SURGERY
Discharge: HOME | End: 2021-02-09
Attending: INTERNAL MEDICINE | Admitting: INTERNAL MEDICINE
Payer: COMMERCIAL

## 2021-02-09 ENCOUNTER — ANESTHESIA EVENT (OUTPATIENT)
Dept: OPERATING ROOM | Age: 56
End: 2021-02-09

## 2021-02-09 VITALS
DIASTOLIC BLOOD PRESSURE: 82 MMHG | HEART RATE: 61 BPM | RESPIRATION RATE: 19 BRPM | TEMPERATURE: 99 F | SYSTOLIC BLOOD PRESSURE: 120 MMHG | BODY MASS INDEX: 21.38 KG/M2 | HEIGHT: 66 IN | WEIGHT: 133 LBS | OXYGEN SATURATION: 100 %

## 2021-02-09 VITALS — OXYGEN SATURATION: 100 % | DIASTOLIC BLOOD PRESSURE: 70 MMHG | SYSTOLIC BLOOD PRESSURE: 113 MMHG

## 2021-02-09 PROCEDURE — 45378 DIAGNOSTIC COLONOSCOPY: CPT | Performed by: INTERNAL MEDICINE

## 2021-02-09 PROCEDURE — 45378 DIAGNOSTIC COLONOSCOPY: CPT

## 2021-02-09 PROCEDURE — G8907 PT DOC NO EVENTS ON DISCHARG: HCPCS

## 2021-02-09 PROCEDURE — G8918 PT W/O PREOP ORDER IV AB PRO: HCPCS

## 2021-02-09 RX ORDER — PROPOFOL 10 MG/ML
INJECTION, EMULSION INTRAVENOUS PRN
Status: DISCONTINUED | OUTPATIENT
Start: 2021-02-09 | End: 2021-02-09 | Stop reason: SDUPTHER

## 2021-02-09 RX ORDER — SODIUM CHLORIDE 9 MG/ML
INJECTION, SOLUTION INTRAVENOUS CONTINUOUS
Status: DISCONTINUED | OUTPATIENT
Start: 2021-02-09 | End: 2021-02-09 | Stop reason: HOSPADM

## 2021-02-09 RX ORDER — PROMETHAZINE HYDROCHLORIDE 25 MG/ML
6.25 INJECTION, SOLUTION INTRAMUSCULAR; INTRAVENOUS
Status: DISCONTINUED | OUTPATIENT
Start: 2021-02-09 | End: 2021-02-09 | Stop reason: HOSPADM

## 2021-02-09 RX ORDER — ONDANSETRON 2 MG/ML
4 INJECTION INTRAMUSCULAR; INTRAVENOUS
Status: DISCONTINUED | OUTPATIENT
Start: 2021-02-09 | End: 2021-02-09 | Stop reason: HOSPADM

## 2021-02-09 RX ORDER — LIDOCAINE HYDROCHLORIDE 10 MG/ML
INJECTION, SOLUTION INFILTRATION; PERINEURAL PRN
Status: DISCONTINUED | OUTPATIENT
Start: 2021-02-09 | End: 2021-02-09 | Stop reason: SDUPTHER

## 2021-02-09 RX ORDER — DIPHENHYDRAMINE HYDROCHLORIDE 50 MG/ML
12.5 INJECTION INTRAMUSCULAR; INTRAVENOUS
Status: DISCONTINUED | OUTPATIENT
Start: 2021-02-09 | End: 2021-02-09 | Stop reason: HOSPADM

## 2021-02-09 RX ADMIN — PROPOFOL 30 MG: 10 INJECTION, EMULSION INTRAVENOUS at 08:56

## 2021-02-09 RX ADMIN — PROPOFOL 30 MG: 10 INJECTION, EMULSION INTRAVENOUS at 09:00

## 2021-02-09 RX ADMIN — PROPOFOL 30 MG: 10 INJECTION, EMULSION INTRAVENOUS at 09:07

## 2021-02-09 RX ADMIN — SODIUM CHLORIDE: 9 INJECTION, SOLUTION INTRAVENOUS at 08:17

## 2021-02-09 RX ADMIN — PROPOFOL 60 MG: 10 INJECTION, EMULSION INTRAVENOUS at 09:03

## 2021-02-09 RX ADMIN — LIDOCAINE HYDROCHLORIDE 5 ML: 10 INJECTION, SOLUTION INFILTRATION; PERINEURAL at 08:55

## 2021-02-09 ASSESSMENT — LIFESTYLE VARIABLES: SMOKING_STATUS: 0

## 2021-02-09 NOTE — H&P
Patient Name: Juan Salas  : 1965  MRN: [id number]  DATE: 21    Allergies: Allergies   Allergen Reactions    Iohexol Rash     This is IV Contrast    Sulfa Antibiotics Rash        ENDOSCOPY  History and Physical    Procedure:    [] Diagnostic Colonoscopy       [x] Screening Colonoscopy  [] EGD      [] ERCP      [] EUS       [] Other    [x] Previous office notes/History and Physical reviewed from the patients chart. Please see EMR for further details of HPI. I have examined the patient's status immediately prior to the procedure and:      Indications/HPI:    1. Hx of adenomatous colonic polyps      2. Chronic constipation      3. Non-ulcer dyspepsia         []Abdominal Pain   []Cancer- GI/Lung     []Fhx of colon CA/polyps  []History of Polyps  []Barretts            []Melena  []Abnormal Imaging              []Dysphagia              []Persistent Pneumonia   []Anemia                            []Food Impaction        []History of Polyps  [] GI Bleed             []Pulmonary nodule/Mass   []Change in bowel habits []Heartburn/Reflux  []Rectal Bleed (BRBPR)  []Chest Pain - Non Cardiac []Heme (+) Stool []Ulcers  []Constipation  []Hemoptysis  []Varices  []Diarrhea  []Hypoxemia    []Nausea/Vomiting   []Screening   []Crohns/Colitis  []Other:     Anesthesia:   [x] MAC [] Moderate Sedation   [] General   [] None     ROS: 12 pt Review of Symptoms was negative unless mentioned above    Medications:   Prior to Admission medications    Medication Sig Start Date End Date Taking?  Authorizing Provider   azelastine (ASTELIN) 0.1 % nasal spray 1 spray by Nasal route 2 times daily Use in each nostril as directed   Yes Historical Provider, MD   ipratropium (ATROVENT) 0.06 % nasal spray 2 sprays by Each Nostril route 2 times daily   Yes Historical Provider, MD   desvenlafaxine succinate (PRISTIQ) 50 MG TB24 extended release tablet Take 1 tablet by mouth daily 20  Yes Radford Barthel, MD temazepam (RESTORIL) 30 MG capsule Take 1 capsule by mouth nightly as needed for anxiety or sleep.  12/8/20 3/29/21 Yes Mandy Pak MD   hyoscyamine (LEVSIN/SL) 125 MCG sublingual tablet TAKE 2 TABLETS BY MOUTH 3 TIMES DAILY AS NEEDED (BLOATING) 11/5/20  Yes Mandy Pak MD   Cholecalciferol (VITAMIN D) 2000 units CAPS capsule Take by mouth   Yes Historical Provider, MD   cetirizine (ZYRTEC) 10 MG tablet Take 1 tablet by mouth daily 8/05/81  Yes GRACIE Vasquez   topiramate (TOPAMAX) 25 MG tablet Take 25 mg by mouth daily    Yes Historical Provider, MD   fluticasone (FLONASE) 50 MCG/ACT nasal spray 2 sprays   Yes Historical Provider, MD   estradiol (ESTRACE) 2 MG tablet Take 2 mg by mouth daily 11/29/16  Yes Historical Provider, MD   polyethylene glycol (GLYCOLAX) powder Take 1 g by mouth as needed 11/14/16   Historical Provider, MD       Past Medical History:  Past Medical History:   Diagnosis Date    Allergic rhinitis     Anxiety     Arthritis     Osetopenia    Irritable bowel syndrome     Irritable bowel syndrome (IBS)     Sinus infection        Past Surgical History:  Past Surgical History:   Procedure Laterality Date    BLADDER SURGERY  2013    vaginal tape    COLONOSCOPY  10/26/2015    Dr Guerita Mcgovern AP, active colitis    HYSTERECTOMY      one ovary left    SINUS SURGERY      TONSILLECTOMY      UPPER GASTROINTESTINAL ENDOSCOPY  10/16/2015    Dr Sania Dillard-w/balloon dil-18mm- Hiatal hernia, GEJ stricture, erythema in mid to distal esophagus-    UPPER GASTROINTESTINAL ENDOSCOPY  08/23/2017    Dr Isaiah Rinne Edwards-w/balloon [id number] hernia, normal path    UPPER GASTROINTESTINAL ENDOSCOPY  03/15/2019    Dr Sania Dillard-w/balloon [id number] hernia, GEJ stricture, reflux    UPPER GASTROINTESTINAL ENDOSCOPY N/A 1/27/2020    Dr Yfn Pham, neg for GERD    UPPER GASTROINTESTINAL ENDOSCOPY N/A 1/27/2020 Dr Venita Song, neg for GERD    WISDOM TOOTH EXTRACTION         Social History:  Social History     Tobacco Use    Smoking status: Never Smoker    Smokeless tobacco: Never Used   Substance Use Topics    Alcohol use: Yes     Comment: occ    Drug use: No       Vital Signs:   Vitals:    02/09/21 0755   BP: 114/80   Pulse: 80   Resp: 19   Temp: 99 °F (37.2 °C)   SpO2: 100%        Physical Exam:  Cardiac:  [x]WNL  []Comments:  Pulmonary:  [x]WNL   []Comments:  Neuro/Mental Status:  [x]WNL  []Comments:  Abdominal:  [x]WNL    []Comments:  Other:   []WNL  []Comments:    Informed Consent:  The risks and benefits of the procedure have been discussed with either the patient or if they cannot consent, their representative. Assessment:  Patient examined and appropriate for planned sedation and procedure. Plan:  Proceed with planned sedation and procedure as above.          Aga Leary MD

## 2021-02-09 NOTE — OP NOTE
Patient: Zac Upton : 1965  Med Rec#: [id number] Acc#: [id number]   Primary Care Provider Ainsley Salcedo MD    Date of Procedure:  2021    Endoscopist: Ivette Bhatti MD    Referring Provider: Ainsley Salcedo MD,     Operation Performed: Colonoscopy up to the Cecum    Indications:   1. Hx of adenomatous colonic polyps      2. Chronic constipation      3. Non-ulcer dyspepsia       Anesthesia:  Sedation was administered by anesthesia who monitored the patient during the procedure. I met with Zac Upton prior to procedure. We discussed the procedure itself, and I have discussed the risks of endoscopy (including-- but not limited to-- pain, discomfort, bleeding potentially requiring second endoscopic procedure and/or blood transfusion, organ perforation requiring operative repair, damage to organs near the colon, infection, aspiration, cardiopulmonary/allergic reaction), benefits, indications to endoscopy. Additionally, we discussed options other than colonoscopy. The patient expressed understanding. All questions answered. The patient decided to proceed with the procedure. Signed informed consent was placed on the chart. Blood Loss: minimal    Withdrawal time: >6 mins  Bowel Prep: adequate and good    Complications: no immediate complications    DESCRIPTION OF PROCEDURE:     A time out was performed. After written informed consent was obtained, the patient was placed in the left lateral position. The perianal area was inspected, and a digital rectal exam was performed. A rectal exam was performed: normal tone, no palpable lesions. At this point, a forward viewing Olympus colonoscope was inserted into the anus and carefully advanced to the Cecum. The cecum was identified by the ileocecal valve and the appendiceal orifice. The colonoscope was then slowly withdrawn with careful inspection of the mucosa in a linear and circumferential fashion. The scope was retroflexed in the rectum. Suction was utilized during the procedure to remove as much air as possible from the bowel. The colonoscope was removed from the patient, and the procedure was terminated. Findings are listed below. Findings: The mucosa appeared normal throughout the entire examined colon. NO large polyps or masses or strictures or colitis. Internal hemorrhoids-Grade 1  Where it was clearly visible, the mucosa appeared normal throughout the entire examined colon  Retroflexion in the rectum was otherwise normal and revealed no further abnormalities     Recommendations:  1. Repeat colonoscopy: due to her hx of polyps-in 5 years for CRCS  2. - Resume previous meds and diet  - GI clinic f/u PRN   - Keep scheduled f/u appts with other MDs     Findings and recommendations were discussed w/ the patient. A copy of the images was provided.     Royce Montaño MD  2/9/2021  8:58 AM

## 2021-02-09 NOTE — ANESTHESIA PRE PROCEDURE
Department of Anesthesiology  Preprocedure Note       Name:  Iris Kelly   Age:  54 y.o.  :  1965                                          MRN:  [id number]         Date:  2021      Surgeon: Sammi Sanches):  Ernesto Manuel MD    Procedure: Procedure(s):  COLORECTAL CANCER SCREENING, NOT HIGH RISK    Medications prior to admission:   Prior to Admission medications    Medication Sig Start Date End Date Taking? Authorizing Provider   azelastine (ASTELIN) 0.1 % nasal spray 1 spray by Nasal route 2 times daily Use in each nostril as directed    Historical Provider, MD   ipratropium (ATROVENT) 0.06 % nasal spray 2 sprays by Each Nostril route 2 times daily    Historical Provider, MD   desvenlafaxine succinate (PRISTIQ) 50 MG TB24 extended release tablet Take 1 tablet by mouth daily 20   Gabriela Bernal MD   temazepam (RESTORIL) 30 MG capsule Take 1 capsule by mouth nightly as needed for anxiety or sleep.  12/8/20 3/29/21  Gabriela Bernal MD   hyoscyamine (LEVSIN/SL) 125 MCG sublingual tablet TAKE 2 TABLETS BY MOUTH 3 TIMES DAILY AS NEEDED (BLOATING) 20   Gabriela Bernal MD   Cholecalciferol (VITAMIN D) 2000 units CAPS capsule Take by mouth    Historical Provider, MD   cetirizine (ZYRTEC) 10 MG tablet Take 1 tablet by mouth daily    GRACIE Zaidi   topiramate (TOPAMAX) 25 MG tablet Take 25 mg by mouth daily     Historical Provider, MD   Black Cohosh 540 MG CAPS Take 540 mg by mouth    Historical Provider, MD   fluticasone (FLONASE) 50 MCG/ACT nasal spray 2 sprays    Historical Provider, MD   estradiol (ESTRACE) 2 MG tablet Take 2 mg by mouth daily 16   Historical Provider, MD   polyethylene glycol (GLYCOLAX) powder Take 1 g by mouth as needed 16   Historical Provider, MD       Current medications:    Current Facility-Administered Medications   Medication Dose Route Frequency Provider Last Rate Last Admin Vital Signs (Current): There were no vitals filed for this visit. BP Readings from Last 3 Encounters:   12/30/20 120/65   12/10/20 117/80   12/08/20 110/70       NPO Status:                                                                                 BMI:   Wt Readings from Last 3 Encounters:   12/30/20 133 lb (60.3 kg)   12/10/20 135 lb (61.2 kg)   12/08/20 136 lb (61.7 kg)     There is no height or weight on file to calculate BMI.    CBC:   Lab Results   Component Value Date    WBC 5.0 12/30/2020    RBC 4.76 12/30/2020    HGB 13.3 12/30/2020    HCT 42.9 12/30/2020    MCV 90.1 12/30/2020    RDW 15.9 12/30/2020     12/30/2020       CMP:   Lab Results   Component Value Date     12/30/2020    K 3.9 12/30/2020     12/30/2020    CO2 25 12/30/2020    BUN 10 12/30/2020    CREATININE 0.6 12/30/2020    GFRAA >59 12/30/2020    LABGLOM >60 12/30/2020    GLUCOSE 87 12/30/2020    PROT 6.4 12/30/2020    CALCIUM 8.3 12/30/2020    BILITOT <0.2 12/30/2020    ALKPHOS 68 12/30/2020    AST 20 12/30/2020    ALT 14 12/30/2020       POC Tests: No results for input(s): POCGLU, POCNA, POCK, POCCL, POCBUN, POCHEMO, POCHCT in the last 72 hours.     Coags: No results found for: PROTIME, INR, APTT    HCG (If Applicable): No results found for: PREGTESTUR, PREGSERUM, HCG, HCGQUANT     ABGs: No results found for: PHART, PO2ART, HBM3SYP, ZCT5RFZ, BEART, W3CLUEMN     Type & Screen (If Applicable):  No results found for: LABABO, LABRH    Drug/Infectious Status (If Applicable):  No results found for: HIV, HEPCAB    COVID-19 Screening (If Applicable):   Lab Results   Component Value Date    COVID19 DETECTED 02/05/2021         Anesthesia Evaluation  Patient summary reviewed and Nursing notes reviewed  Airway: Mallampati: II  TM distance: >3 FB   Neck ROM: full  Mouth opening: > = 3 FB Dental: normal exam         Pulmonary:Negative Pulmonary ROS and normal exam

## 2021-03-22 ENCOUNTER — OFFICE VISIT (OUTPATIENT)
Dept: INTERNAL MEDICINE | Age: 56
End: 2021-03-22
Payer: COMMERCIAL

## 2021-03-22 VITALS
DIASTOLIC BLOOD PRESSURE: 70 MMHG | OXYGEN SATURATION: 98 % | SYSTOLIC BLOOD PRESSURE: 100 MMHG | BODY MASS INDEX: 22.27 KG/M2 | RESPIRATION RATE: 18 BRPM | WEIGHT: 138 LBS | HEART RATE: 101 BPM

## 2021-03-22 DIAGNOSIS — F41.1 GENERALIZED ANXIETY DISORDER: ICD-10-CM

## 2021-03-22 DIAGNOSIS — M54.2 CERVICALGIA: Primary | ICD-10-CM

## 2021-03-22 DIAGNOSIS — M77.01 MEDIAL EPICONDYLITIS OF RIGHT ELBOW: ICD-10-CM

## 2021-03-22 DIAGNOSIS — F51.01 PRIMARY INSOMNIA: ICD-10-CM

## 2021-03-22 DIAGNOSIS — M54.12 CERVICAL RADICULOPATHY: ICD-10-CM

## 2021-03-22 PROCEDURE — 99214 OFFICE O/P EST MOD 30 MIN: CPT | Performed by: INTERNAL MEDICINE

## 2021-03-22 RX ORDER — METHOCARBAMOL 500 MG/1
500 TABLET, FILM COATED ORAL EVERY EVENING
Qty: 20 TABLET | Refills: 0 | Status: SHIPPED | OUTPATIENT
Start: 2021-03-22 | End: 2021-04-11

## 2021-03-22 RX ORDER — MELOXICAM 15 MG/1
15 TABLET ORAL DAILY
Qty: 20 TABLET | Refills: 0 | Status: SHIPPED | OUTPATIENT
Start: 2021-03-22 | End: 2022-04-19

## 2021-03-22 ASSESSMENT — ENCOUNTER SYMPTOMS
COUGH: 0
WHEEZING: 0
SORE THROAT: 0
CONSTIPATION: 0
CHEST TIGHTNESS: 0
ABDOMINAL PAIN: 0

## 2021-03-22 ASSESSMENT — PATIENT HEALTH QUESTIONNAIRE - PHQ9
SUM OF ALL RESPONSES TO PHQ QUESTIONS 1-9: 0
SUM OF ALL RESPONSES TO PHQ QUESTIONS 1-9: 0
2. FEELING DOWN, DEPRESSED OR HOPELESS: 0

## 2021-03-22 NOTE — PROGRESS NOTES
Chief Complaint   Patient presents with    Elbow Pain     Right elbow, been hurting for a long time, States that she was diagnoses with Golfer's elbow in 2018, states that now the whole arm hurts and her thumbs jerks in it own, Hand numbness and tingling. Pt was given Duexis back in 2018 for this problem     History of presenting illness:  Mehran Quiros is a52 y.o. female who presents today for follow up on her chronic medical conditions as noted below.     RIGHT ELBOW PAIN last few months  Also complains of pain radiating from her right neck to her right upper arm, at times her right hand is tingly painful or right thumb is \"twitchy\"  At times hurts when sleeping with pain radiating from her right neck to her right upper extremity    She is also here for follow-up regarding     Generalized anxiety disorder  Doing well on decreased dose Pristiq 50 daily  He has been under a significant amount of stress related to family issues-  Her father now at assisted living at [facility] ( Parkinsonism)  Also stress associated with her grandson- dx with autism ( lives in [address] [place] [address] Dr martinez/ ++ gene mutation found)     Primary insomnia  Needs meds refills    Patient Active Problem List    Diagnosis Date Noted    Exposure to influenza 08/29/2019    Status post dilatation of esophageal stricture 04/22/2019    Primary insomnia 07/17/2018    Osteopenia of multiple sites 07/17/2018     Overview Note:     3/2018  * lumbar spine average T score is -1.45, she does have T score of -2.2 at L4  * Left hip T score -1.8, neck -1.9,troch -2.1  * Right hip total -1.6, neck -1.9,troch -1.8  * Radius total -1.5  5/2020 Lumbar T score -1.7( L4 -2.4)/ hip bilaterally -1.5/ -1.3        Orthostatic dizziness 07/17/2018    Generalized anxiety disorder 04/17/2018    Menopause ovarian failure 04/17/2018     Past Medical History:   Diagnosis Date    Allergic rhinitis     Anxiety     Arthritis     Osetopenia    Irritable bowel syndrome     Irritable bowel syndrome (IBS)     Sinus infection       Past Surgical History:   Procedure Laterality Date    BLADDER SURGERY  2013    vaginal tape    COLONOSCOPY  10/26/2015    Dr Kevin Nelson AP, active colitis    COLONOSCOPY N/A 02/09/2021    Dr Kaye Covarrubias, Int Hemorrhoids Grade 1, 5 year recall    HYSTERECTOMY      one ovary left    SINUS SURGERY      TONSILLECTOMY      UPPER GASTROINTESTINAL ENDOSCOPY  10/16/2015    Dr Deangelo Dillard-w/balloon [id number]- Hiatal hernia, GEJ stricture, erythema in mid to distal esophagus-    UPPER GASTROINTESTINAL ENDOSCOPY  08/23/2017    Dr Christina An-w/balloon [id number] hernia, normal path    UPPER GASTROINTESTINAL ENDOSCOPY  03/15/2019    Dr Deangelo Dillard-w/balloon [id number] hernia, GEJ stricture, reflux    UPPER GASTROINTESTINAL ENDOSCOPY N/A 01/27/2020    Dr Masoud Ramos, neg for GERD    UPPER GASTROINTESTINAL ENDOSCOPY N/A 01/27/2020    Dr Masoud Ramos, neg for GERD    WISDOM TOOTH EXTRACTION       Current Outpatient Medications   Medication Sig Dispense Refill    meloxicam (MOBIC) 15 MG tablet Take 1 tablet by mouth daily 20 tablet 0    methocarbamol (ROBAXIN) 500 MG tablet Take 1 tablet by mouth every evening for 20 days 20 tablet 0    azelastine (ASTELIN) 0.1 % nasal spray 1 spray by Nasal route 2 times daily Use in each nostril as directed      ipratropium (ATROVENT) 0.06 % nasal spray 2 sprays by Each Nostril route 2 times daily      desvenlafaxine succinate (PRISTIQ) 50 MG TB24 extended release tablet Take 1 tablet by mouth daily 30 tablet 5    temazepam (RESTORIL) 30 MG capsule Take 1 capsule by mouth nightly as needed for anxiety or sleep.  30 capsule 5    hyoscyamine (LEVSIN/SL) 125 MCG sublingual tablet TAKE 2 TABLETS BY MOUTH 3 TIMES DAILY AS NEEDED (BLOATING) 40 tablet 5    Cholecalciferol (VITAMIN D) 2000 units CAPS capsule Take by mouth      cetirizine (ZYRTEC) 10 MG tablet Take 1 tablet by mouth daily 90 tablet 3    topiramate (TOPAMAX) 25 MG tablet Take 25 mg by mouth daily       fluticasone (FLONASE) 50 MCG/ACT nasal spray 2 sprays      estradiol (ESTRACE) 2 MG tablet Take 2 mg by mouth daily      polyethylene glycol (GLYCOLAX) powder Take 1 g by mouth as needed       No current facility-administered medications for this visit. Allergies   Allergen Reactions    Iohexol Rash     This is IV Contrast    Sulfa Antibiotics Rash     Social History     Tobacco Use    Smoking status: Never Smoker    Smokeless tobacco: Never Used   Substance Use Topics    Alcohol use: Yes     Comment: occ      Family History   Problem Relation Age of Onset    Cancer Father         malt    Parkinsonism Father     Stomach Cancer Father         maltoma    Cancer Mother         lung    Lung Cancer Mother     Colon Cancer Neg Hx     Colon Polyps Neg Hx        Review of Systems   Constitutional: Positive for fatigue. Negative for chills and fever. HENT: Negative for congestion, ear pain, nosebleeds, postnasal drip and sore throat. Respiratory: Negative for cough, chest tightness and wheezing. Cardiovascular: Negative for chest pain, palpitations and leg swelling. Gastrointestinal: Negative for abdominal pain and constipation. Genitourinary: Negative for dysuria and urgency. Musculoskeletal: Positive for arthralgias and neck pain. Skin: Negative for rash. Neurological: Negative for dizziness and headaches. Psychiatric/Behavioral: Negative. Vitals:    03/22/21 1249   BP: 100/70   Site: Left Upper Arm   Position: Sitting   Cuff Size: Large Adult   Pulse: 101   Resp: 18   SpO2: 98%   Weight: 138 lb (62.6 kg)     Body mass index is 22.27 kg/m². Physical Exam  Constitutional:       Appearance: She is well-developed.    HENT:      Right Ear: External ear normal.      Left Ear: External ear normal.      Mouth/Throat:      Pharynx: No oropharyngeal exudate. Eyes:      Conjunctiva/sclera: Conjunctivae normal.      Pupils: Pupils are equal, round, and reactive to light. Neck:      Musculoskeletal: Neck supple. Thyroid: No thyromegaly. Vascular: No JVD. Cardiovascular:      Rate and Rhythm: Normal rate. Heart sounds: Normal heart sounds. No murmur. Pulmonary:      Effort: No respiratory distress. Breath sounds: Normal breath sounds. No wheezing or rales. Chest:      Chest wall: No tenderness. Abdominal:      General: Bowel sounds are normal.      Palpations: Abdomen is soft. Musculoskeletal:      Comments: Induced pain on palpation over paracervical  muscles  ROM with neck flexion/ extension  Limited  Upper extremity muscle strength and reflexes are normal     Lymphadenopathy:      Cervical: No cervical adenopathy. Skin:     Findings: No rash. Lab Review   Office Visit on 02/05/2021   Component Date Value    SARS-CoV-2, PCR 02/05/2021 DETECTED*           ASSESSMENT/PLAN:    Cervicalgia    Cervical radiculopathy  Obtain further evaluation  -     Nerve conduction test; Future  -     EMG; Future  Rx  1. Neck exercise instructions  2.-     meloxicam (MOBIC) 15 MG tablet; Take 1 tablet by mouth daily  -     methocarbamol (ROBAXIN) 500 MG tablet;  Take 1 tablet by mouth every evening for 20 days    If sx not improving and resolving , if sx continue or re-occur pt has been instructed to call us and / or return here for follow- up evaluation    Medial epicondylitis of right elbow  Use elbow brace  NSAID RX as above  If sx not improving and resolving , if sx continue or re-occur pt has been instructed to call us and / or return here for follow- up evaluation    Generalized anxiety disorder- refill Pristiq 50 , seems to be doing well        Primary insomnia, refill temazepam she takes 30 mg every evening and has been doing this for years  Elizabeth Woo reviewed  Elizabeth Woo was reviewed  On exam today and as per discussions with the patient today there is no evidence of adverse events such as cognitive impairment, sedation, constipation or falls related to prescribed medications. There is also no evidence of aberrant behavior like lost prescriptions or early refill requests or multiple prescribers for controlled substances.     Patient  was advised NOT to attempt to drive a motor vehichle or operate any heavy machinery within 6 hrs of taking the presribed medication - temazepam    Ricky 3/2021    Orders Placed This Encounter   Procedures    Nerve conduction test    EMG     New Prescriptions    MELOXICAM (MOBIC) 15 MG TABLET    Take 1 tablet by mouth daily    METHOCARBAMOL (ROBAXIN) 500 MG TABLET    Take 1 tablet by mouth every evening for 20 days         No follow-ups on file. There are no Patient Instructions on file for this visit. EMR Dragon/transcription disclaimer:Significant part of this  encounter note is electronic transcription/translationof spoken language to printed text. The electronic translation of spoken language may be erroneous, or at times, nonsensical words or phrases may be inadvertently transcribed.  Although I have reviewed the note for sucherrors, some may still exist.

## 2021-03-31 ENCOUNTER — HOSPITAL ENCOUNTER (OUTPATIENT)
Dept: NEUROLOGY | Age: 56
Discharge: HOME | End: 2021-03-31
Payer: COMMERCIAL

## 2021-03-31 DIAGNOSIS — M54.2 CERVICALGIA: ICD-10-CM

## 2021-03-31 DIAGNOSIS — M54.12 CERVICAL RADICULOPATHY: ICD-10-CM

## 2021-03-31 PROCEDURE — 95886 MUSC TEST DONE W/N TEST COMP: CPT

## 2021-03-31 PROCEDURE — 95886 MUSC TEST DONE W/N TEST COMP: CPT | Performed by: PSYCHIATRY & NEUROLOGY

## 2021-03-31 PROCEDURE — 95909 NRV CNDJ TST 5-6 STUDIES: CPT

## 2021-03-31 PROCEDURE — 95909 NRV CNDJ TST 5-6 STUDIES: CPT | Performed by: PSYCHIATRY & NEUROLOGY

## 2021-05-26 DIAGNOSIS — F51.01 PRIMARY INSOMNIA: ICD-10-CM

## 2021-05-26 DIAGNOSIS — Z12.31 ENCOUNTER FOR SCREENING MAMMOGRAM FOR BREAST CANCER: ICD-10-CM

## 2021-05-26 DIAGNOSIS — M85.89 OSTEOPENIA OF MULTIPLE SITES: ICD-10-CM

## 2021-05-26 DIAGNOSIS — E55.9 VITAMIN D DEFICIENCY: ICD-10-CM

## 2021-05-26 DIAGNOSIS — Z00.00 ANNUAL PHYSICAL EXAM: ICD-10-CM

## 2021-05-26 LAB
ALBUMIN SERPL-MCNC: 4.4 G/DL (ref 3.5–5.2)
ALP BLD-CCNC: 88 U/L (ref 35–104)
ALT SERPL-CCNC: 21 U/L (ref 5–33)
ANION GAP SERPL CALCULATED.3IONS-SCNC: 12 MMOL/L (ref 7–19)
AST SERPL-CCNC: 26 U/L (ref 5–32)
BACTERIA: NEGATIVE /HPF
BASOPHILS ABSOLUTE: 0 K/UL (ref 0–0.2)
BASOPHILS RELATIVE PERCENT: 0.2 % (ref 0–1)
BILIRUB SERPL-MCNC: 0.4 MG/DL (ref 0.2–1.2)
BILIRUBIN URINE: NEGATIVE
BLOOD, URINE: NEGATIVE
BUN BLDV-MCNC: 16 MG/DL (ref 6–20)
CALCIUM SERPL-MCNC: 9.9 MG/DL (ref 8.6–10)
CHLORIDE BLD-SCNC: 104 MMOL/L (ref 98–111)
CHOLESTEROL, TOTAL: 202 MG/DL (ref 160–199)
CLARITY: CLEAR
CO2: 27 MMOL/L (ref 22–29)
COLOR: YELLOW
CREAT SERPL-MCNC: 0.6 MG/DL (ref 0.5–0.9)
CRYSTALS, UA: ABNORMAL /HPF
EOSINOPHILS ABSOLUTE: 0.1 K/UL (ref 0–0.6)
EOSINOPHILS RELATIVE PERCENT: 0.6 % (ref 0–5)
EPITHELIAL CELLS, UA: 1 /HPF (ref 0–5)
GFR AFRICAN AMERICAN: >59
GFR NON-AFRICAN AMERICAN: >60
GLUCOSE BLD-MCNC: 82 MG/DL (ref 74–109)
GLUCOSE URINE: NEGATIVE MG/DL
HCT VFR BLD CALC: 42.1 % (ref 37–47)
HDLC SERPL-MCNC: 85 MG/DL (ref 65–121)
HEMOGLOBIN: 13.4 G/DL (ref 12–16)
HYALINE CASTS: 0 /HPF (ref 0–8)
IMMATURE GRANULOCYTES #: 0 K/UL
KETONES, URINE: NEGATIVE MG/DL
LDL CHOLESTEROL CALCULATED: 102 MG/DL
LEUKOCYTE ESTERASE, URINE: ABNORMAL
LYMPHOCYTES ABSOLUTE: 2 K/UL (ref 1.1–4.5)
LYMPHOCYTES RELATIVE PERCENT: 21.7 % (ref 20–40)
MCH RBC QN AUTO: 29.1 PG (ref 27–31)
MCHC RBC AUTO-ENTMCNC: 31.8 G/DL (ref 33–37)
MCV RBC AUTO: 91.5 FL (ref 81–99)
MONOCYTES ABSOLUTE: 0.6 K/UL (ref 0–0.9)
MONOCYTES RELATIVE PERCENT: 6.8 % (ref 0–10)
NEUTROPHILS ABSOLUTE: 6.3 K/UL (ref 1.5–7.5)
NEUTROPHILS RELATIVE PERCENT: 70.3 % (ref 50–65)
NITRITE, URINE: NEGATIVE
PDW BLD-RTO: 14.7 % (ref 11.5–14.5)
PH UA: 6.5 (ref 5–8)
PLATELET # BLD: 212 K/UL (ref 130–400)
PMV BLD AUTO: 11.9 FL (ref 9.4–12.3)
POTASSIUM SERPL-SCNC: 4.8 MMOL/L (ref 3.5–5)
PROTEIN UA: NEGATIVE MG/DL
RBC # BLD: 4.6 M/UL (ref 4.2–5.4)
RBC UA: 0 /HPF (ref 0–4)
SODIUM BLD-SCNC: 143 MMOL/L (ref 136–145)
SPECIFIC GRAVITY UA: 1 (ref 1–1.03)
TOTAL PROTEIN: 7.7 G/DL (ref 6.6–8.7)
TRIGL SERPL-MCNC: 74 MG/DL (ref 0–149)
TSH SERPL DL<=0.05 MIU/L-ACNC: 2.09 UIU/ML (ref 0.27–4.2)
UROBILINOGEN, URINE: 0.2 E.U./DL
VITAMIN D 25-HYDROXY: 57 NG/ML
WBC # BLD: 9 K/UL (ref 4.8–10.8)
WBC UA: 3 /HPF (ref 0–5)

## 2021-06-02 ENCOUNTER — OFFICE VISIT (OUTPATIENT)
Dept: INTERNAL MEDICINE | Age: 56
End: 2021-06-02
Payer: COMMERCIAL

## 2021-06-02 VITALS
HEART RATE: 86 BPM | RESPIRATION RATE: 18 BRPM | OXYGEN SATURATION: 98 % | SYSTOLIC BLOOD PRESSURE: 108 MMHG | WEIGHT: 136 LBS | DIASTOLIC BLOOD PRESSURE: 80 MMHG | HEIGHT: 66 IN | BODY MASS INDEX: 21.86 KG/M2

## 2021-06-02 DIAGNOSIS — M85.89 OSTEOPENIA OF MULTIPLE SITES: ICD-10-CM

## 2021-06-02 DIAGNOSIS — E55.9 VITAMIN D DEFICIENCY: ICD-10-CM

## 2021-06-02 DIAGNOSIS — F51.01 PRIMARY INSOMNIA: ICD-10-CM

## 2021-06-02 DIAGNOSIS — M54.2 CERVICALGIA: ICD-10-CM

## 2021-06-02 DIAGNOSIS — Z00.00 ANNUAL PHYSICAL EXAM: Primary | ICD-10-CM

## 2021-06-02 PROCEDURE — 80305 DRUG TEST PRSMV DIR OPT OBS: CPT | Performed by: INTERNAL MEDICINE

## 2021-06-02 PROCEDURE — 99396 PREV VISIT EST AGE 40-64: CPT | Performed by: INTERNAL MEDICINE

## 2021-06-02 RX ORDER — TEMAZEPAM 30 MG/1
CAPSULE ORAL
Qty: 30 CAPSULE | Refills: 5 | Status: SHIPPED | OUTPATIENT
Start: 2021-06-02 | End: 2021-11-29 | Stop reason: SDUPTHER

## 2021-06-02 RX ORDER — DESVENLAFAXINE 50 MG/1
50 TABLET, EXTENDED RELEASE ORAL DAILY
Qty: 30 TABLET | Refills: 5 | Status: SHIPPED | OUTPATIENT
Start: 2021-06-02 | End: 2021-11-29 | Stop reason: SDUPTHER

## 2021-06-02 ASSESSMENT — ENCOUNTER SYMPTOMS
COUGH: 0
ABDOMINAL PAIN: 0
CHEST TIGHTNESS: 0
SORE THROAT: 0
CONSTIPATION: 0
WHEEZING: 0

## 2021-06-02 NOTE — PROGRESS NOTES
Betina-w/LALITHA-Benign, neg for GERD    UPPER GASTROINTESTINAL ENDOSCOPY N/A 01/27/2020    Dr Alyssa Bernal, neg for GERD    WISDOM TOOTH EXTRACTION         Current Outpatient Medications   Medication Sig Dispense Refill    desvenlafaxine succinate (PRISTIQ) 50 MG TB24 extended release tablet Take 1 tablet by mouth daily 30 tablet 5    temazepam (RESTORIL) 30 MG capsule Take 1 capsule by mouth nightly as needed for anxiety or sleep. 30 capsule 5    meloxicam (MOBIC) 15 MG tablet Take 1 tablet by mouth daily 20 tablet 0    azelastine (ASTELIN) 0.1 % nasal spray 1 spray by Nasal route 2 times daily Use in each nostril as directed      ipratropium (ATROVENT) 0.06 % nasal spray 2 sprays by Each Nostril route 2 times daily as needed       hyoscyamine (LEVSIN/SL) 125 MCG sublingual tablet TAKE 2 TABLETS BY MOUTH 3 TIMES DAILY AS NEEDED (BLOATING) 40 tablet 5    Cholecalciferol (VITAMIN D) 2000 units CAPS capsule Take by mouth      cetirizine (ZYRTEC) 10 MG tablet Take 1 tablet by mouth daily 90 tablet 3    topiramate (TOPAMAX) 25 MG tablet Take 25 mg by mouth daily       fluticasone (FLONASE) 50 MCG/ACT nasal spray 2 sprays      estradiol (ESTRACE) 1 MG tablet Take 1 mg by mouth daily       polyethylene glycol (GLYCOLAX) powder Take 1 g by mouth as needed       No current facility-administered medications for this visit.      Allergies   Allergen Reactions    Iohexol Rash     This is IV Contrast    Sulfa Antibiotics Rash       Social History     Socioeconomic History    Marital status:      Spouse name: None    Number of children: 3    Years of education: None    Highest education level: None   Occupational History    Occupation: Retired    Tobacco Use    Smoking status: Never Smoker    Smokeless tobacco: Never Used   Vaping Use    Vaping Use: Never used   Substance and Sexual Activity    Alcohol use: Yes     Comment: occ    Drug use: No    Sexual activity: None   Other Topics Concern    None   Social History Narrative    He was  but  recently remarried and moved to [address] [place] [address] N 11/2017, she has 3 adult children ( 2 adopted)     Social Determinants of Health     Financial Resource Strain:     Difficulty of Paying Living Expenses:    Food Insecurity:     Worried About Running Out of Food in the Last Year:     [address] in the Last Year:    Transportation Needs:     Lack of Transportation (Medical):      Lack of Transportation (Non-Medical):    Physical Activity:     Days of Exercise per Week:     Minutes of Exercise per Session:    Stress:     Feeling of Stress :    Social Connections:     Frequency of Communication with Friends and Family:     Frequency of Social Gatherings with Friends and Family:     Attends Worship Services:     Active Member of Clubs or Organizations:     Attends Club or Organization Meetings:     Marital Status:    Intimate Partner Violence:     Fear of Current or Ex-Partner:     Emotionally Abused:     Physically Abused:     Sexually Abused:      Family History   Problem Relation Age of Onset    Cancer Father         malt    Parkinsonism Father     Stomach Cancer Father         maltoma    Cancer Mother         lung    Lung Cancer Mother     Colon Cancer Neg Hx     Colon Polyps Neg Hx           Past Surgical History:   Procedure Laterality Date    BLADDER SURGERY  2013    vaginal tape    COLONOSCOPY  10/26/2015    Dr Phyllis Gtz AP, active colitis    COLONOSCOPY N/A 02/09/2021    Dr Christina Paula, Int Hemorrhoids Grade 1, 5 year recall    HYSTERECTOMY      one ovary left    SINUS SURGERY      TONSILLECTOMY      UPPER GASTROINTESTINAL ENDOSCOPY  10/16/2015    Dr Marilee Dillard-w/balloon dil-18mm- Hiatal hernia, GEJ stricture, erythema in mid to distal esophagus-    UPPER GASTROINTESTINAL ENDOSCOPY  08/23/2017    Dr Hansa An-w/balloon [id number] hernia, normal path    UPPER GASTROINTESTINAL ENDOSCOPY  03/15/2019    Dr Joseph Zaldivaru-w/balloon [id number] hernia, GEJ stricture, reflux    UPPER GASTROINTESTINAL ENDOSCOPY N/A 01/27/2020    Dr Karen Peñaloza, neg for GERD    UPPER GASTROINTESTINAL ENDOSCOPY N/A 01/27/2020    Dr Karen Peñaloza, neg for GERD    WISDOM TOOTH EXTRACTION           Lab Review   Orders Only on 05/26/2021   Component Date Value    Vit D, 25-Hydroxy 05/26/2021 57.0     TSH 05/26/2021 2.090     Color, UA 05/26/2021 YELLOW     Clarity, UA 05/26/2021 Clear     Glucose, Ur 05/26/2021 Negative     Bilirubin Urine 05/26/2021 Negative     Ketones, Urine 05/26/2021 Negative     Specific Gravity, UA 05/26/2021 1.004     Blood, Urine 05/26/2021 Negative     pH, UA 05/26/2021 6.5     Protein, UA 05/26/2021 Negative     Urobilinogen, Urine 05/26/2021 0.2     Nitrite, Urine 05/26/2021 Negative     Leukocyte Esterase, Urine 05/26/2021 MODERATE*    Cholesterol, Total 05/26/2021 202*    Triglycerides 05/26/2021 74     HDL 05/26/2021 85     LDL Calculated 05/26/2021 102     Sodium 05/26/2021 143     Potassium 05/26/2021 4.8     Chloride 05/26/2021 104     CO2 05/26/2021 27     Anion Gap 05/26/2021 12     Glucose 05/26/2021 82     BUN 05/26/2021 16     CREATININE 05/26/2021 0.6     GFR Non- 05/26/2021 >60     GFR  05/26/2021 >59     Calcium 05/26/2021 9.9     Total Protein 05/26/2021 7.7     Albumin 05/26/2021 4.4     Total Bilirubin 05/26/2021 0.4     Alkaline Phosphatase 05/26/2021 88     ALT 05/26/2021 21     AST 05/26/2021 26     WBC 05/26/2021 9.0     RBC 05/26/2021 4.60     Hemoglobin 05/26/2021 13.4     Hematocrit 05/26/2021 42.1     MCV 05/26/2021 91.5     MCH 05/26/2021 29.1     MCHC 05/26/2021 31.8*    RDW 05/26/2021 14.7*    Platelets 81/40/0628 212     MPV 05/26/2021 11.9     Neutrophils % 05/26/2021 70.3*    Lymphocytes % 05/26/2021 21.7     Monocytes % 05/26/2021 6.8     Eosinophils % 05/26/2021 0.6     Basophils % 05/26/2021 0.2     Neutrophils Absolute 05/26/2021 6.3     Immature Granulocytes # 05/26/2021 0.0     Lymphocytes Absolute 05/26/2021 2.0     Monocytes Absolute 05/26/2021 0.60     Eosinophils Absolute 05/26/2021 0.10     Basophils Absolute 05/26/2021 0.00     Bacteria, UA 05/26/2021 NEGATIVE*    Crystals, UA 05/26/2021 NEG*    Hyaline Casts, UA 05/26/2021 0     WBC, UA 05/26/2021 3     RBC, UA 05/26/2021 0     Epithelial Cells, UA 05/26/2021 1          Review of Systems   Constitutional: Positive for fatigue. Negative for chills and fever. HENT: Negative for congestion, ear pain, nosebleeds, postnasal drip and sore throat. Respiratory: Negative for cough, chest tightness and wheezing. Cardiovascular: Negative for chest pain, palpitations and leg swelling. Gastrointestinal: Negative for abdominal pain and constipation. Genitourinary: Negative for dysuria and urgency. Musculoskeletal: Positive for arthralgias. Rt hip pain   Skin: Negative for rash. Neurological: Negative for dizziness and headaches. Psychiatric/Behavioral: Negative. Vitals:    06/02/21 1307   BP: 108/80   Site: Left Upper Arm   Position: Sitting   Cuff Size: Large Adult   Pulse: 86   Resp: 18   SpO2: 98%   Weight: 136 lb (61.7 kg)   Height: 5' 6\" (1.676 m)      Wt Readings from Last 3 Encounters:   06/02/21 136 lb (61.7 kg)   03/22/21 138 lb (62.6 kg)   02/09/21 133 lb (60.3 kg)   Body mass index is 21.95 kg/m². BP Readings from Last 3 Encounters:   06/02/21 108/80   03/22/21 100/70   02/09/21 120/82       Physical Exam  Constitutional:       General: She is not in acute distress. Appearance: She is well-developed. She is not diaphoretic. HENT:      Head: Normocephalic and atraumatic. Nose: Nose normal.   Eyes:      General: No scleral icterus. Right eye: No discharge.          Left eye: No discharge. Conjunctiva/sclera: Conjunctivae normal.      Pupils: Pupils are equal, round, and reactive to light. Neck:      Thyroid: No thyromegaly. Vascular: No JVD. Cardiovascular:      Rate and Rhythm: Normal rate and regular rhythm. Heart sounds: No murmur heard. Pulmonary:      Breath sounds: Normal breath sounds. No wheezing or rales. Chest:      Chest wall: No tenderness. Abdominal:      General: Bowel sounds are normal. There is no distension. Tenderness: There is no guarding. Musculoskeletal:      Cervical back: Normal range of motion. Lymphadenopathy:      Cervical: No cervical adenopathy. Skin:     General: Skin is dry. Findings: No erythema or rash. Neurological:      Mental Status: She is oriented to person, place, and time. Cranial Nerves: No cranial nerve deficit. Coordination: Coordination normal.      Deep Tendon Reflexes: Reflexes are normal and symmetric. Psychiatric:         Behavior: Behavior normal.         Thought Content:  Thought content normal.         Judgment: Judgment normal.           ASSESSMENT/PLAN  Annual physical exam  *Screening mammogram   * BD-  see below  * PAP 2020  *Colonoscopy 2/2021-repeat 5 yrs    Vit D level now better 57 in 5/2021 62(55 )( 27)  Cont vitamin D 2000 iu daily  ++ Osteopenia  3/2018  * lumbar spine average T score is -1.45, she does have T score of -2.2 at L4  * Left hip T score -1.8, neck -1.9,troch -2.1  * Right hip total -1.6, neck -1.9,troch -1.8  * Radius total -1.5  5/2020 Lumbar T score -1.7( L4 -2.4)/ hip bilaterally -1.5/ -1.3  BD plan 5/2022    Cervicalgia   Cervical radiculopathy   Medial epicondylitis of right elbow  better  Use elbow brace     Generalized anxiety disorder-   refill Pristiq 50 ,   seems to be doing well        Primary insomnia, refill temazepam she takes 30 mg every evening and has been doing this for years  Get Gross reviewed  Get Gross was reviewed  On exam today and as per discussions with the patient today there is no evidence of adverse events such as cognitive impairment, sedation, constipation or falls related to prescribed medications. There is also no evidence of aberrant behavior like lost prescriptions or early refill requests or multiple prescribers for controlled substances.     Patient  was advised NOT to attempt to drive a motor vehichle or operate any heavy machinery within 6 hrs of taking the presribed medication - temazepam    Ricky 5/2021    RT anterior hip pain last few week  Pain on palpation over rt superior iliac crest  Likely ligamentous strain  Avoid lifting  If sx not improving and resolving , if sx continue or re-occur pt has been instructed to call us and / or return here for follow- up evaluation      Orders Placed This Encounter   Procedures    CBC Auto Differential    Comprehensive Metabolic Panel    Lipid Panel    Urinalysis    TSH without Reflex    Vitamin D 25 Hydroxy    POCT Rapid Drug Screen     New Prescriptions    No medications on file      There are no Patient Instructions on file for this visit. Return in about 1 year (around 6/2/2022) for Annual Physical.   EMR Dragon/transcription disclaimer:Significant part of this  encounter note is electronic transcription/translation of spoken language to printed text. The electronic translation of spoken language may beerroneous, or at times, nonsensical words or phrases may be inadvertently transcribed.  Although I have reviewed the note for such errors, some may still exist.

## 2021-06-02 NOTE — LETTER
CONTROLLED SUBSTANCE MEDICATION AGREEMENT     Patient Name: Dacia Marinelli  Patient YOB: 1965   I understand, that controlled substance medications may be used to help better manage my symptoms and to improve my ability to function at home, work and in social settings. However, I also understand that these medications do have risks, which have been discussed with me, including possible development of physical or psychological dependence. I understand that successful treatment requires mutual trust and honesty between me and my provider. I understand and agree that following this Medication Agreement is necessary in continuing my provider-patient relationship and the success of my treatment plan. Explanation from my Provider: Benefits and Goals of Controlled Substance Medications: There are two potential goals for your treatment: (1) decreased pain and suffering (2) improved daily life functions. There are many possible treatments for your chronic condition(s). Alternatives such as physical therapy, yoga, massage, home daily exercise, meditation, relaxation techniques, injections, chiropractic manipulations, surgery, cognitive therapy, hypnosis and many medications that are not habit-forming may be used. Use of controlled substance medications may be helpful, but they are unlikely to resolve all symptoms or restore all function. Explanation from my Provider: Risks of Controlled Substance Medications:  Opioid pain medications: These medications can lead to problems such as addiction/dependence, sedation, lightheadedness/dizziness, memory issues, falls, constipation, nausea, or vomiting. They may also impair the ability to drive or operate machinery. Additionally, these medications may lower testosterone levels, leading to loss of bone strength, stamina and sex drive.   They may cause problems with breathing, sleep apnea and reduced coughing, which is especially dangerous for patients with lung disease. Overdose or dangerous interactions with alcohol and other medications may occur, leading to death. Hyperalgesia may develop, which means patients receiving opioids for the treatment of pain may become more sensitive to certain painful stimuli, and in some cases, experience pain from ordinarily non-painful stimuli. Women between the ages of 14-53 who could become pregnant should carefully weigh the risks and benefits of opioids with their physicians, as these medications increase the risk of pregnancy complications, including miscarriage,  delivery and stillbirth. It is also possible for babies to be born addicted to opioids. Opioid dependence withdrawal symptoms may include; feelings of uneasiness, increased pain, irritability, belly pain, diarrhea, sweats and goose-flesh. Benzodiazepines and non-benzodiazepine sleep medications: These medications can lead to problems such as addiction/dependence, sedation, fatigue, lightheadedness, dizziness, incoordination, falls, depression, hallucinations, and impaired judgment, memory and concentration. The ability to drive and operate machinery may also be affected. Abnormal sleep-related behaviors have been reported, including sleepwalking, driving, making telephone calls, eating, or having sex while not fully awake. These medications can suppress breathing and worsen sleep apnea, particularly when combined with alcohol or other sedating medications, potentially leading to death. Dependence withdrawal symptoms may include tremors, anxiety, hallucinations and seizures. Stimulants:  Common adverse effects include addiction/dependence, increased blood  pressure and heart rate, decreased appetite, nausea, involuntary weight loss, insomnia,                                                                                                                     Initials:_______   irritability, and headaches.   These risks may increase when these medications are combined with other stimulants, such as caffeine pills or energy drinks, certain weight loss supplements and oral decongestants. Dependence withdrawal symptoms may include depressed mood, loss of interest, suicidal thoughts, anxiety, fatigue, appetite changes and agitation. Testosterone replacement therapy:  Potential side effects include increased risk of stroke and heart attack, blood clots, increased blood pressure, increased cholesterol, enlarged prostate, sleep apnea, irritability/aggression and other mood disorders, and decreased fertility. I agree and understand that I and my prescriber have the following rights and responsibilities regarding my treatment plan:     1. MY RIGHTS:  To be informed of my treatment and medication plan. To be an active participant in my health and wellbeing. 2. MY RESPONSIBILITY AND UNDERSTANDING FOR USE OF MEDICATIONS   I will take medications at the dose and frequency as directed. For my safety, I will not increase or change how I take my medications without the recommendation of my healthcare provider.  I will actively participate in any program recommended by my provider which may improve function, including social, physical, psychological programs.  I will not take my medications with alcohol or other drugs not prescribed to me. I understand that drinking alcohol with my medications increases the chances of side effects, including reduced breathing rate and could lead to personal injury when operating machinery.  I understand that if I have a history of substance use disorders, including alcohol or other illicit drugs, that I may be at increased risk of addiction to my medications.  I agree to notify my provider immediately if I should become pregnant so that my treatment plan can be adjusted.    I agree and understand that I shall only receive controlled substance medications from the prescriber that signed this agreement unless there is written agreement among other prescribers of controlled substances outlining the responsibility of the medications being prescribed.  I understand that the if the controlled medication is not helping to achieve goals, the dosage may be tapered and no longer prescribed. 3. MY RESPONSIBILITY FOR COMMUNICATION / PRESCRIPTION RENEWALS   I agree that all controlled substance medications that I take will be prescribed only by my provider. If another healthcare provider prescribes me medication in an emergency, I will notify my provider within seventy-two (72) hours.  I will arrange for refills at the prescribed interval ONLY during regular office hours. I will not ask for refills earlier than agreed, after-hours, on holidays or weekends. Refills may take up to 72 hours for processing and prescriptions to reach the pharmacy.  I will inform my other health care providers that I am taking these medications and of the existence of this [id number]. In the event of an emergency, I will provide the same information to the emergency department prescribers.  I will keep my provider updated on the pharmacy I am using for controlled medication prescription filling. Initials:_______  4. MY RESPONSIBILITY FOR PROTECTING MEDICATIONS   I will protect my prescriptions and medications. I understand that lost or misplaced prescriptions will not be replaced.  I will keep medications only for my own use and will not share them with others. I will keep all medications away from children.  I agree that if my medications are adjusted or discontinued, I will properly dispose of any remaining medications. I understand that I will be required to dispose of any remaining controlled medications as, directed by my prescriber, prior to being provided with any prescriptions for other controlled medications.   Medication drop box locations can be found at: [email or web address]    5. MY RESPONSIBILITY WITH ILLEGAL DRUGS    I will not use illegal or street drugs or another person's prescription medications not prescribed to me.  If there are identified addiction type symptoms, then referral to a program may be provided by my provider and I agree to follow through with this recommendation. 6. MY RESPONSIBILITY FOR COOPERATION WITH INVESTIGATIONS   I understand that my provider will comply with any applicable law and may discuss my use and/or possible misuse/abuse of controlled substances and alcohol, as appropriate, with any health care provider involved in my care, pharmacist, or legal authority.  I authorize my provider and pharmacy to cooperate fully with law enforcement agencies (as permitted by law) in the investigation of any possible misuse, sale, or other diversion of my controlled substances.  I agree to waive any applicable privilege or right of privacy or confidentiality with respect to these authorizations. 7. PROVIDERS RIGHT TO MONITOR FOR SAFETY: PRESCRIPTION MONITORING / DRUG TESTING   I consent to drug/toxicology screening and will submit to a drug screen upon my providers request to assure I am only taking the prescribed drugs for my safety monitoring. I understand that a drug screen is a laboratory test in which a sample of my urine, blood or saliva is checked to see what drugs I have been taking. This may entail an observed urine specimen, which means that a nurse or other health care provider may watch me provide urine, and I will cooperate if I am asked to provide an observed specimen.  I understand that my provider will check a copy of my State Prescription Monitoring Program () Report in order to safely prescribe medications.  Pill Counts: I consent to pill counts when requested.   I may be asked to bring all my prescribed controlled substance medications, in their original bottles, to all of my scheduled appointments. In addition, my provider may ask me to come to the practice at any time for a random pill count. 8. TERMINATION OF THIS AGREEMENT  For my safety, my prescriber has the right to stop prescribing controlled substance medications and may end this agreement. Initials:_______   Conditions that may result in termination of this agreement:  a. I do not show any improvement in pain, or my activity has not improved. b. I develop rapid tolerance or loss of improvement, as described in my treatment plan.  c. I develop significant side effects from the medication. d. My behavior is not consistent with the responsibilities outlined above, thereby causing safety concerns to continue prescribing controlled substance medications. e. I fail to follow the terms of this agreement. f. Other:____________________________       UNDERSTANDING THIS MEDICATION AGREEMENT:    I have read the above and have had all my questions answered. For chronic disease management, I know that my symptoms can be managed with many types of treatments. A chronic medication trial may be part of my treatment, but I must be an active participant in my care. Medication therapy is only one part of my symptom management plan. In some cases, there may be limited scientific evidence to support the chronic use of certain medications to improve symptoms and daily function. Furthermore, in certain circumstances, there may be scientific information that suggests that the use of chronic controlled substances may worsen my symptoms and increase my risk of unintentional death directly related to this medication therapy. I know that if my provider feels my risk from controlled medications is greater than my benefit, I will have my controlled substance medication(s) compassionately lowered or removed altogether.      I further agree to allow this office to contact my HIPAA contact if there are concerns about my safety and use of the controlled medications. I have agreed to use the prescribed controlled substance medications to me as instructed by my provider and as stated in this Medication Agreement. My initial on each page and my signature below shows that I have read each page and I have had the opportunity to ask questions with answers provided by my provider.     Patient Name (Printed): _____________________________________  Patient Signature:  ______________________   Date: _____________    Prescriber Name (Printed): ___________________________________  Prescriber Signature: _____________________  Date: _____________

## 2021-06-07 ENCOUNTER — HOSPITAL ENCOUNTER (OUTPATIENT)
Dept: WOMENS IMAGING | Age: 56
Discharge: HOME | End: 2021-06-07
Payer: COMMERCIAL

## 2021-06-07 DIAGNOSIS — Z12.31 ENCOUNTER FOR SCREENING MAMMOGRAM FOR BREAST CANCER: ICD-10-CM

## 2021-06-07 PROCEDURE — 77067 SCR MAMMO BI INCL CAD: CPT

## 2021-08-27 ENCOUNTER — OFFICE VISIT (OUTPATIENT)
Dept: INTERNAL MEDICINE | Age: 56
End: 2021-08-27
Payer: COMMERCIAL

## 2021-08-27 VITALS
OXYGEN SATURATION: 98 % | WEIGHT: 136 LBS | BODY MASS INDEX: 21.86 KG/M2 | RESPIRATION RATE: 18 BRPM | DIASTOLIC BLOOD PRESSURE: 70 MMHG | SYSTOLIC BLOOD PRESSURE: 110 MMHG | HEIGHT: 66 IN | HEART RATE: 73 BPM

## 2021-08-27 DIAGNOSIS — F51.01 PRIMARY INSOMNIA: ICD-10-CM

## 2021-08-27 DIAGNOSIS — F41.1 GENERALIZED ANXIETY DISORDER: Primary | ICD-10-CM

## 2021-08-27 PROCEDURE — 99213 OFFICE O/P EST LOW 20 MIN: CPT | Performed by: INTERNAL MEDICINE

## 2021-08-27 SDOH — ECONOMIC STABILITY: FOOD INSECURITY: WITHIN THE PAST 12 MONTHS, THE FOOD YOU BOUGHT JUST DIDN'T LAST AND YOU DIDN'T HAVE MONEY TO GET MORE.: NEVER TRUE

## 2021-08-27 SDOH — ECONOMIC STABILITY: FOOD INSECURITY: WITHIN THE PAST 12 MONTHS, YOU WORRIED THAT YOUR FOOD WOULD RUN OUT BEFORE YOU GOT MONEY TO BUY MORE.: NEVER TRUE

## 2021-08-27 ASSESSMENT — ENCOUNTER SYMPTOMS
SORE THROAT: 0
ABDOMINAL PAIN: 0
WHEEZING: 0
CONSTIPATION: 0
CHEST TIGHTNESS: 0
COUGH: 0

## 2021-08-27 ASSESSMENT — SOCIAL DETERMINANTS OF HEALTH (SDOH): HOW HARD IS IT FOR YOU TO PAY FOR THE VERY BASICS LIKE FOOD, HOUSING, MEDICAL CARE, AND HEATING?: NOT HARD AT ALL

## 2021-08-27 NOTE — PROGRESS NOTES
Chief Complaint   Patient presents with    3 Month Follow-Up     History of presenting illness:  Leary Kanner is a52 y.o. female who presents today for follow up on her chronic medical conditions as noted below.          Patient Active Problem List    Diagnosis Date Noted    Exposure to influenza 08/29/2019    Status post dilatation of esophageal stricture 04/22/2019    Primary insomnia 07/17/2018    Osteopenia of multiple sites 07/17/2018     Overview Note:     3/2018  * lumbar spine average T score is -1.45, she does have T score of -2.2 at L4  * Left hip T score -1.8, neck -1.9,troch -2.1  * Right hip total -1.6, neck -1.9,troch -1.8  * Radius total -1.5  5/2020 Lumbar T score -1.7( L4 -2.4)/ hip bilaterally -1.5/ -1.3        Orthostatic dizziness 07/17/2018    Generalized anxiety disorder 04/17/2018    Menopause ovarian failure 04/17/2018     Past Medical History:   Diagnosis Date    Allergic rhinitis     Anxiety     Arthritis     Osetopenia    Irritable bowel syndrome     Irritable bowel syndrome (IBS)     Sinus infection       Past Surgical History:   Procedure Laterality Date    BLADDER SURGERY  2013    vaginal tape    COLONOSCOPY  10/26/2015    Dr Gabriela Martell AP, active colitis    COLONOSCOPY N/A 02/09/2021    Dr Radha Jose, Int Hemorrhoids Grade 1, 5 year recall    HYSTERECTOMY      one ovary left    OVARY REMOVAL Right     SINUS SURGERY      TONSILLECTOMY      UPPER GASTROINTESTINAL ENDOSCOPY  10/16/2015    Dr Bonnie Dillard-w/balloon [id number]- Hiatal hernia, GEJ stricture, erythema in mid to distal esophagus-    UPPER GASTROINTESTINAL ENDOSCOPY  08/23/2017    Dr Estephanie An-w/balloon [id number] hernia, normal path    UPPER GASTROINTESTINAL ENDOSCOPY  03/15/2019    Dr Bonnie Dillard-w/balloon [id number] hernia, GEJ stricture, reflux    UPPER GASTROINTESTINAL ENDOSCOPY N/A 01/27/2020     chills, fatigue and fever. HENT: Negative for congestion, ear pain, nosebleeds, postnasal drip and sore throat. Respiratory: Negative for cough, chest tightness and wheezing. Cardiovascular: Negative for chest pain, palpitations and leg swelling. Gastrointestinal: Negative for abdominal pain and constipation. Genitourinary: Negative for dysuria and urgency. Musculoskeletal: Negative. Negative for arthralgias. Skin: Negative for rash. Neurological: Negative for dizziness and headaches. Psychiatric/Behavioral: Positive for sleep disturbance. The patient is nervous/anxious. Vitals:    08/27/21 1132   BP: 110/70   Site: Left Upper Arm   Position: Sitting   Cuff Size: Large Adult   Pulse: 73   Resp: 18   SpO2: 98%   Weight: 136 lb (61.7 kg)   Height: 5' 6\" (1.676 m)     Body mass index is 21.95 kg/m². Physical Exam  Constitutional:       Appearance: She is well-developed. HENT:      Right Ear: External ear normal.      Left Ear: External ear normal.      Mouth/Throat:      Pharynx: No oropharyngeal exudate. Eyes:      Conjunctiva/sclera: Conjunctivae normal.      Pupils: Pupils are equal, round, and reactive to light. Neck:      Thyroid: No thyromegaly. Vascular: No JVD. Cardiovascular:      Rate and Rhythm: Normal rate. Heart sounds: Normal heart sounds. No murmur heard. Pulmonary:      Effort: No respiratory distress. Breath sounds: Normal breath sounds. No wheezing or rales. Chest:      Chest wall: No tenderness. Abdominal:      General: Bowel sounds are normal.      Palpations: Abdomen is soft. Musculoskeletal:      Cervical back: Neck supple. Lymphadenopathy:      Cervical: No cervical adenopathy. Skin:     Findings: No rash. Lab Review   No visits with results within 2 Month(s) from this visit.    Latest known visit with results is:   Office Visit on 06/02/2021   Component Date Value    Alcohol, Urine 06/02/2021 na     Amphetamine Screen, Urine 06/02/2021 neg     Barbiturate Screen, Urine 06/02/2021 neg     Benzodiazepine Screen, U* 06/02/2021 POS     Buprenorphine Urine 06/02/2021 neg     Cocaine Metabolite Scree* 06/02/2021 neg     FENTANYL SCREEN, URINE 06/02/2021 na     Gabapentin Screen, Urine 06/02/2021 na     MDMA, Urine 06/02/2021 neg     Methadone Screen, Urine 06/02/2021 neg     Methamphetamine, Urine 06/02/2021 neg     Opiate Scrn, Ur 06/02/2021 neg     Oxycodone Screen, Ur 06/02/2021 neg     PCP Screen, Urine 06/02/2021 neg     Propoxyphene Screen, Kaiden Duel* 06/02/2021 na     Synthetic Cannabinoids (* 06/02/2021 na     THC Screen, Urine 06/02/2021 neg     Tramadol Scrn, Ur 26/73/5358 na     Tricyclic Antidepressant* 69/89/0548 na            ASSESSMENT/PLAN:    Generalized anxiety disorder-   refill Pristiq 50 ,   seems to be doing well        Primary insomnia, refill temazepam she takes 30 mg every evening and has been doing this for years  Dustin Tobar reviewed  Dustin Tobar was reviewed  On exam today and as per discussions with the patient today there is no evidence of adverse events such as cognitive impairment, sedation, constipation or falls related to prescribed medications. There is also no evidence of aberrant behavior like lost prescriptions or early refill requests or multiple prescribers for controlled substances.     Patient  was advised NOT to attempt to drive a motor vehichle or operate any heavy machinery within 6 hrs of taking the presribed medication - temazepam    Ricky 8/2021      No orders of the defined types were placed in this encounter. New Prescriptions    No medications on file         No follow-ups on file. There are no Patient Instructions on file for this visit. EMR Dragon/transcription disclaimer:Significant part of this  encounter note is electronic transcription/translationof spoken language to printed text.  The electronic translation of spoken language may be erroneous, or at times, nonsensical words or phrases may be inadvertently transcribed.  Although I have reviewed the note for sucherrors, some may still exist.

## 2021-11-15 ENCOUNTER — HOSPITAL ENCOUNTER (EMERGENCY)
Age: 56
Discharge: HOME | End: 2021-11-15
Payer: COMMERCIAL

## 2021-11-15 VITALS
HEART RATE: 89 BPM | SYSTOLIC BLOOD PRESSURE: 117 MMHG | TEMPERATURE: 97.6 F | BODY MASS INDEX: 21.47 KG/M2 | OXYGEN SATURATION: 98 % | RESPIRATION RATE: 17 BRPM | WEIGHT: 133 LBS | DIASTOLIC BLOOD PRESSURE: 75 MMHG

## 2021-11-15 DIAGNOSIS — S61.211A LACERATION OF LEFT INDEX FINGER WITHOUT FOREIGN BODY WITHOUT DAMAGE TO NAIL, INITIAL ENCOUNTER: Primary | ICD-10-CM

## 2021-11-15 PROCEDURE — 6360000002 HC RX W HCPCS: Performed by: PHYSICIAN ASSISTANT

## 2021-11-15 PROCEDURE — 90471 IMMUNIZATION ADMIN: CPT | Performed by: PHYSICIAN ASSISTANT

## 2021-11-15 PROCEDURE — 90715 TDAP VACCINE 7 YRS/> IM: CPT | Performed by: PHYSICIAN ASSISTANT

## 2021-11-15 PROCEDURE — 99283 EMERGENCY DEPT VISIT LOW MDM: CPT

## 2021-11-15 RX ORDER — TETANUS AND DIPHTHERIA TOXOIDS ADSORBED 2; 2 [LF]/.5ML; [LF]/.5ML
0.5 INJECTION INTRAMUSCULAR ONCE
Status: DISCONTINUED | OUTPATIENT
Start: 2021-11-15 | End: 2021-11-15

## 2021-11-15 RX ADMIN — TETANUS TOXOID, REDUCED DIPHTHERIA TOXOID AND ACELLULAR PERTUSSIS VACCINE, ADSORBED 0.5 ML: 5; 2.5; 8; 8; 2.5 SUSPENSION INTRAMUSCULAR at 21:49

## 2021-11-15 ASSESSMENT — ENCOUNTER SYMPTOMS
COLOR CHANGE: 0
COUGH: 0
APNEA: 0
EYE DISCHARGE: 0
BACK PAIN: 0
SORE THROAT: 0
ABDOMINAL PAIN: 0
PHOTOPHOBIA: 0
NAUSEA: 0
SHORTNESS OF BREATH: 0
EYE PAIN: 0
RHINORRHEA: 0
ABDOMINAL DISTENTION: 0

## 2021-11-15 NOTE — TELEPHONE ENCOUNTER
Rafal White called requesting a refill of the below medication which has been pended for you:     Requested Prescriptions     Pending Prescriptions Disp Refills    hyoscyamine (LEVSIN/SL) 125 MCG sublingual tablet [Pharmacy Med Name: HYOSCYAMINE 0.125 MG TAB SL] 40 tablet 4     Sig: TAKE 2 TABLETS BY MOUTH 3 TIMES DAILY AS NEEDED (BLOATING)       Last Appointment Date: 8/27/2021  Next Appointment Date: 11/29/2021    Allergies   Allergen Reactions    Iohexol Rash     This is IV Contrast    Sulfa Antibiotics Rash

## 2021-11-16 NOTE — ED PROVIDER NOTES
MEDICAL HISTORY     Past Medical History:   Diagnosis Date    Allergic rhinitis     Anxiety     Arthritis     Osetopenia    Irritable bowel syndrome     Irritable bowel syndrome (IBS)     Sinus infection          SURGICAL HISTORY       Past Surgical History:   Procedure Laterality Date    BLADDER SURGERY  2013    vaginal tape    COLONOSCOPY  10/26/2015    Dr Doris Ramirez AP, active colitis    COLONOSCOPY N/A 02/09/2021    Dr Lady Reza, Int Hemorrhoids Grade 1, 5 year recall    HYSTERECTOMY      one ovary left    OVARY REMOVAL Right     SINUS SURGERY      TONSILLECTOMY      UPPER GASTROINTESTINAL ENDOSCOPY  10/16/2015    Dr Tete Dillard-w/balloon dil-18mm- Hiatal hernia, GEJ stricture, erythema in mid to distal esophagus-    UPPER GASTROINTESTINAL ENDOSCOPY  08/23/2017    Dr Golden An-w/balloon [id number] hernia, normal path    UPPER GASTROINTESTINAL ENDOSCOPY  03/15/2019    Dr Tete Dillard-w/balloon [id number] hernia, GEJ stricture, reflux    UPPER GASTROINTESTINAL ENDOSCOPY N/A 01/27/2020    Dr Bridger Santa, neg for GERD    UPPER GASTROINTESTINAL ENDOSCOPY N/A 01/27/2020    Dr Bridger Santa, neg for GERD    WISDOM TOOTH EXTRACTION           CURRENT MEDICATIONS       Previous Medications    AZELASTINE (ASTELIN) 0.1 % NASAL SPRAY    1 spray by Nasal route 2 times daily Use in each nostril as directed    CHOLECALCIFEROL (VITAMIN D) 2000 UNITS CAPS CAPSULE    Take by mouth    DESVENLAFAXINE SUCCINATE (PRISTIQ) 50 MG TB24 EXTENDED RELEASE TABLET    Take 1 tablet by mouth daily    ESTRADIOL (ESTRACE) 1 MG TABLET    Take 1 mg by mouth daily     HYOSCYAMINE (LEVSIN/SL) 125 MCG SUBLINGUAL TABLET    TAKE 2 TABLETS BY MOUTH 3 TIMES DAILY AS NEEDED (BLOATING)    MELOXICAM (MOBIC) 15 MG TABLET    Take 1 tablet by mouth daily    POLYETHYLENE GLYCOL (GLYCOLAX) POWDER    Take 1 g by mouth as needed TEMAZEPAM (RESTORIL) 30 MG CAPSULE    Take 1 capsule by mouth nightly as needed for anxiety or sleep. TOPIRAMATE (TOPAMAX) 25 MG TABLET    Take 25 mg by mouth daily        ALLERGIES     Iohexol and Sulfa antibiotics    FAMILY HISTORY       Family History   Problem Relation Age of Onset    Cancer Father         malt    Parkinsonism Father     Stomach Cancer Father         maltoma    Cancer Mother         lung    Lung Cancer Mother     Colon Cancer Neg Hx     Colon Polyps Neg Hx           SOCIAL HISTORY       Social History     Socioeconomic History    Marital status:      Spouse name: None    Number of children: 3    Years of education: None    Highest education level: None   Occupational History    Occupation: Retired    Tobacco Use    Smoking status: Never Smoker    Smokeless tobacco: Never Used   Vaping Use    Vaping Use: Never used   Substance and Sexual Activity    Alcohol use: Yes     Comment: occ    Drug use: No    Sexual activity: None   Other Topics Concern    None   Social History Narrative    He was  but  recently remarried and moved to [place] 11/2017, she has 3 adult children ( 2 adopted)     Social Determinants of Health     Financial Resource Strain: Low Risk     Difficulty of Paying Living Expenses: Not hard at all   Food Insecurity: No Food Insecurity    Worried About Running Out of Food in the Last Year: Never true    Dwayne of Food in the Last Year: Never true   Transportation Needs:     Lack of Transportation (Medical): Not on file    Lack of Transportation (Non-Medical):  Not on file   Physical Activity:     Days of Exercise per Week: Not on file    Minutes of Exercise per Session: Not on file   Stress:     Feeling of Stress : Not on file   Social Connections:     Frequency of Communication with Friends and Family: Not on file    Frequency of Social Gatherings with Friends and Family: Not on file    Attends Confucianist Services: Not on file  Active Member of Clubs or Organizations: Not on file    Attends Club or Organization Meetings: Not on file    Marital Status: Not on file   Intimate Partner Violence:     Fear of Current or Ex-Partner: Not on file    Emotionally Abused: Not on file    Physically Abused: Not on file    Sexually Abused: Not on file   Housing Stability:     Unable to Pay for Housing in the Last Year: Not on file    Number of [place] in the Last Year: Not on file    Unstable Housing in the Last Year: Not on file       SCREENINGS           PHYSICAL EXAM    (up to 7 forlevel 4, 8 or more for level 5)     ED Triage Vitals [11/15/21 1924]   BP Temp Temp Source Pulse Resp SpO2 Height Weight   117/75 97.6 °F (36.4 °C) Temporal 89 17 98 % -- 133 lb (60.3 kg)       Physical Exam  Vitals and nursing note reviewed. Constitutional:       General: She is not in acute distress. Appearance: She is well-developed. She is not diaphoretic. HENT:      Head: Normocephalic and atraumatic. Right Ear: External ear normal.      Left Ear: External ear normal.      Mouth/Throat:      Pharynx: No oropharyngeal exudate. Eyes:      General:         Right eye: No discharge. Left eye: No discharge. Pupils: Pupils are equal, round, and reactive to light. Neck:      Thyroid: No thyromegaly. Cardiovascular:      Rate and Rhythm: Normal rate and regular rhythm. Heart sounds: Normal heart sounds. No murmur heard. No friction rub. Pulmonary:      Effort: Pulmonary effort is normal. No respiratory distress. Breath sounds: Normal breath sounds. No stridor. No wheezing. Abdominal:      General: Bowel sounds are normal. There is no distension. Palpations: Abdomen is soft. Tenderness: There is no abdominal tenderness. Musculoskeletal:         General: Tenderness present. Normal range of motion. Cervical back: Normal range of motion and neck supple. Comments: Estimated . 25cm c shape flape very superficial no blood supply I feel to this skin will likely fall off. No active bleeding. Skin:     General: Skin is warm and dry. Capillary Refill: Capillary refill takes less than 2 seconds. Findings: No rash. Neurological:      General: No focal deficit present. Mental Status: She is alert and oriented to person, place, and time. Cranial Nerves: No cranial nerve deficit. Sensory: No sensory deficit. Coordination: Coordination normal.   Psychiatric:         Behavior: Behavior normal.         Thought Content: Thought content normal.           DIAGNOSTIC RESULTS     RADIOLOGY:   Non-plain film images such as CT, Ultrasound and MRI are read by the radiologist. Plain radiographic images are visualized and preliminarilyinterpreted by No att. providers found with the below findings:        Interpretation per the Radiologist below, if available at the time of this note:    No orders to display       LABS:  Labs Reviewed - No data to display    All other labs were within normal range or notreturned as of this dictation. RE-ASSESSMENT        EMERGENCY DEPARTMENT COURSE and DIFFERENTIAL DIAGNOSIS/MDM:   Vitals:    Vitals:    11/15/21 1924   BP: 117/75   Pulse: 89   Resp: 17   Temp: 97.6 °F (36.4 °C)   TempSrc: Temporal   SpO2: 98%   Weight: 133 lb (60.3 kg)       MDM  After discussion we elect to forgo closure the wound is well approximated superficial and no active bleeding I feel this skin will likely die. abx ointment applied and bandaging applied prior to discharge. PROCEDURES:    Procedures      FINAL IMPRESSION      1.  Laceration of left index finger without foreign body without damage to nail, initial encounter          DISPOSITION/PLAN   DISPOSITION Decision To Discharge 11/15/2021 09:25:29 PM      PATIENT REFERRED TO:  [facility] EMERGENCY DEPT  Miguel Holden  [phone]    If symptoms worsen    Lin Guaman MD  [address]   [phone]      As needed      DISCHARGE MEDICATIONS:  New Prescriptions    No medications on file       (Please note that portions of this note were completed with a voice recognition program.  Efforts were made to edit the dictations but occasionallywords are mis-transcribed.)    Ash Fontaine [address] [place] [address]  11/15/21 [id number]

## 2021-11-29 ENCOUNTER — OFFICE VISIT (OUTPATIENT)
Dept: INTERNAL MEDICINE | Age: 56
End: 2021-11-29
Payer: COMMERCIAL

## 2021-11-29 VITALS
WEIGHT: 137 LBS | DIASTOLIC BLOOD PRESSURE: 68 MMHG | BODY MASS INDEX: 22.02 KG/M2 | OXYGEN SATURATION: 100 % | SYSTOLIC BLOOD PRESSURE: 110 MMHG | RESPIRATION RATE: 18 BRPM | HEART RATE: 88 BPM | HEIGHT: 66 IN

## 2021-11-29 DIAGNOSIS — F51.01 PRIMARY INSOMNIA: ICD-10-CM

## 2021-11-29 DIAGNOSIS — F41.1 GENERALIZED ANXIETY DISORDER: Primary | ICD-10-CM

## 2021-11-29 DIAGNOSIS — M54.2 CERVICALGIA: ICD-10-CM

## 2021-11-29 PROCEDURE — 99213 OFFICE O/P EST LOW 20 MIN: CPT | Performed by: INTERNAL MEDICINE

## 2021-11-29 RX ORDER — TEMAZEPAM 30 MG/1
CAPSULE ORAL
Qty: 30 CAPSULE | Refills: 2 | Status: SHIPPED | OUTPATIENT
Start: 2021-11-29 | End: 2022-03-01 | Stop reason: SDUPTHER

## 2021-11-29 RX ORDER — DESVENLAFAXINE 50 MG/1
50 TABLET, EXTENDED RELEASE ORAL DAILY
Qty: 30 TABLET | Refills: 5 | Status: SHIPPED | OUTPATIENT
Start: 2021-11-29 | End: 2022-06-07 | Stop reason: SDUPTHER

## 2021-11-29 ASSESSMENT — ENCOUNTER SYMPTOMS
WHEEZING: 0
COUGH: 0
CHEST TIGHTNESS: 0
SORE THROAT: 0
CONSTIPATION: 0
ABDOMINAL PAIN: 0

## 2021-11-29 NOTE — PROGRESS NOTES
Chief Complaint   Patient presents with    3 Month Follow-Up     History of presenting illness:  Chloe Flowers is a61 y.o. female who presents today for follow up on her chronic medical conditions as noted below.       Patient Active Problem List    Diagnosis Date Noted    Exposure to influenza 08/29/2019    Status post dilatation of esophageal stricture 04/22/2019    Primary insomnia 07/17/2018    Osteopenia of multiple sites 07/17/2018     Overview Note:     3/2018  * lumbar spine average T score is -1.45, she does have T score of -2.2 at L4  * Left hip T score -1.8, neck -1.9,troch -2.1  * Right hip total -1.6, neck -1.9,troch -1.8  * Radius total -1.5  5/2020 Lumbar T score -1.7( L4 -2.4)/ hip bilaterally -1.5/ -1.3        Orthostatic dizziness 07/17/2018    Generalized anxiety disorder 04/17/2018    Menopause ovarian failure 04/17/2018     Past Medical History:   Diagnosis Date    Allergic rhinitis     Anxiety     Arthritis     Osetopenia    Irritable bowel syndrome     Irritable bowel syndrome (IBS)     Sinus infection       Past Surgical History:   Procedure Laterality Date    BLADDER SURGERY  2013    vaginal tape    COLONOSCOPY  10/26/2015    Dr Lynne Ceja AP, active colitis    COLONOSCOPY N/A 02/09/2021    Dr Ivan December, Int Hemorrhoids Grade 1, 5 year recall    HYSTERECTOMY      one ovary left    OVARY REMOVAL Right     SINUS SURGERY      TONSILLECTOMY      UPPER GASTROINTESTINAL ENDOSCOPY  10/16/2015    Dr Willa Dillard-w/balloon dil-18mm- Hiatal hernia, GEJ stricture, erythema in mid to distal esophagus-    UPPER GASTROINTESTINAL ENDOSCOPY  08/23/2017    Dr Lyndsey An-w/balloon [id number] hernia, normal path    UPPER GASTROINTESTINAL ENDOSCOPY  03/15/2019    Dr Willa Dillard-w/balloon [id number] hernia, GEJ stricture, reflux    UPPER GASTROINTESTINAL ENDOSCOPY N/A 01/27/2020     Urine 06/02/2021 neg     Methadone Screen, Urine 06/02/2021 neg     Methamphetamine, Urine 06/02/2021 neg     Opiate Scrn, Ur 06/02/2021 neg     Oxycodone Screen, Ur 06/02/2021 neg     PCP Screen, Urine 06/02/2021 neg     Propoxyphene Screen, Ryan Carrera* 06/02/2021 na     Synthetic Cannabinoids (* 06/02/2021 na     THC Screen, Urine 06/02/2021 neg     Tramadol Scrn, Ur 69/24/0674 na     Tricyclic Antidepressant* 06/59/9609 na            ASSESSMENT/PLAN:    Generalized anxiety disorder-   refill Pristiq 50 ,   seems to be doing well        Primary insomnia, refill temazepam she takes 30 mg every evening and has been doing this for years  Ashleigh Friday reviewed  Ashleigh Friday was reviewed  On exam today and as per discussions with the patient today there is no evidence of adverse events such as cognitive impairment, sedation, constipation or falls related to prescribed medications. There is also no evidence of aberrant behavior like lost prescriptions or early refill requests or multiple prescribers for controlled substances.     Patient  was advised NOT to attempt to drive a motor vehichle or operate any heavy machinery within 6 hrs of taking the presribed medication - temazepam    Ricky 11/2021             No orders of the defined types were placed in this encounter. New Prescriptions    No medications on file         Return in about 3 months (around 2/28/2022) for Medication check. There are no Patient Instructions on file for this visit. EMR Dragon/transcription disclaimer:Significant part of this  encounter note is electronic transcription/translationof spoken language to printed text. The electronic translation of spoken language may be erroneous, or at times, nonsensical words or phrases may be inadvertently transcribed.  Although I have reviewed the note for sucherrors, some may still exist.

## 2022-03-01 ENCOUNTER — OFFICE VISIT (OUTPATIENT)
Dept: INTERNAL MEDICINE | Age: 57
End: 2022-03-01
Payer: COMMERCIAL

## 2022-03-01 VITALS
BODY MASS INDEX: 22.34 KG/M2 | WEIGHT: 139 LBS | HEART RATE: 76 BPM | DIASTOLIC BLOOD PRESSURE: 70 MMHG | SYSTOLIC BLOOD PRESSURE: 110 MMHG | HEIGHT: 66 IN | RESPIRATION RATE: 18 BRPM | OXYGEN SATURATION: 100 %

## 2022-03-01 DIAGNOSIS — H00.012 HORDEOLUM EXTERNUM OF RIGHT LOWER EYELID: ICD-10-CM

## 2022-03-01 DIAGNOSIS — F51.01 PRIMARY INSOMNIA: Primary | ICD-10-CM

## 2022-03-01 PROCEDURE — 99213 OFFICE O/P EST LOW 20 MIN: CPT | Performed by: INTERNAL MEDICINE

## 2022-03-01 RX ORDER — TEMAZEPAM 30 MG/1
CAPSULE ORAL
Qty: 30 CAPSULE | Refills: 2 | Status: SHIPPED | OUTPATIENT
Start: 2022-03-01 | End: 2022-06-07 | Stop reason: SDUPTHER

## 2022-03-01 RX ORDER — TOBRAMYCIN 3 MG/ML
2 SOLUTION/ DROPS OPHTHALMIC EVERY 4 HOURS
Qty: 5 ML | Refills: 0 | Status: SHIPPED | OUTPATIENT
Start: 2022-03-01 | End: 2022-03-11

## 2022-03-01 ASSESSMENT — PATIENT HEALTH QUESTIONNAIRE - PHQ9
1. LITTLE INTEREST OR PLEASURE IN DOING THINGS: 0
SUM OF ALL RESPONSES TO PHQ QUESTIONS 1-9: 0
SUM OF ALL RESPONSES TO PHQ9 QUESTIONS 1 & 2: 0
2. FEELING DOWN, DEPRESSED OR HOPELESS: 0
SUM OF ALL RESPONSES TO PHQ QUESTIONS 1-9: 0

## 2022-03-01 ASSESSMENT — ENCOUNTER SYMPTOMS
CONSTIPATION: 0
WHEEZING: 0
COUGH: 0
CHEST TIGHTNESS: 0
ABDOMINAL PAIN: 0
SORE THROAT: 0

## 2022-03-01 NOTE — PROGRESS NOTES
Chief Complaint   Patient presents with    3 Month Follow-Up     Bilateral foot pain on-going her whole life, states that when she walks she has pain on the bottom (Side) of her feet     History of presenting illness:  Randy Reason is a61 y.o. female who presents today for follow up on her chronic medical conditions as noted below.       Patient Active Problem List    Diagnosis Date Noted    Exposure to influenza 08/29/2019    Status post dilatation of esophageal stricture 04/22/2019    Primary insomnia 07/17/2018    Osteopenia of multiple sites 07/17/2018     Overview Note:     3/2018  * lumbar spine average T score is -1.45, she does have T score of -2.2 at L4  * Left hip T score -1.8, neck -1.9,troch -2.1  * Right hip total -1.6, neck -1.9,troch -1.8  * Radius total -1.5  5/2020 Lumbar T score -1.7( L4 -2.4)/ hip bilaterally -1.5/ -1.3        Orthostatic dizziness 07/17/2018    Generalized anxiety disorder 04/17/2018    Menopause ovarian failure 04/17/2018     Past Medical History:   Diagnosis Date    Allergic rhinitis     Anxiety     Arthritis     Osetopenia    Irritable bowel syndrome     Irritable bowel syndrome (IBS)     Sinus infection       Past Surgical History:   Procedure Laterality Date    BLADDER SURGERY  2013    vaginal tape    COLONOSCOPY  10/26/2015    Dr Sonya Cabrera AP, active colitis    COLONOSCOPY N/A 02/09/2021    Dr Hernandez Hua, Int Hemorrhoids Grade 1, 5 year recall    HYSTERECTOMY      one ovary left    OVARY REMOVAL Right     SINUS SURGERY      TONSILLECTOMY      UPPER GASTROINTESTINAL ENDOSCOPY  10/16/2015    Dr Andreas Dillard-w/balloon dil-18mm- Hiatal hernia, GEJ stricture, erythema in mid to distal esophagus-    UPPER GASTROINTESTINAL ENDOSCOPY  08/23/2017    Dr Dexter An-w/balloon [id number] hernia, normal path    UPPER GASTROINTESTINAL ENDOSCOPY  03/15/2019    Dr Andreas Dillard-w/balloon [id number] hernia, GEJ stricture, reflux    UPPER GASTROINTESTINAL ENDOSCOPY N/A 01/27/2020    Dr Jennifer Soto, neg for GERD    UPPER GASTROINTESTINAL ENDOSCOPY N/A 01/27/2020    Dr Jennifer Soto, neg for GERD    WISDOM TOOTH EXTRACTION       Current Outpatient Medications   Medication Sig Dispense Refill    desvenlafaxine succinate (PRISTIQ) 50 MG TB24 extended release tablet Take 1 tablet by mouth daily 30 tablet 5    temazepam (RESTORIL) 30 MG capsule Take 1 capsule by mouth nightly as needed for anxiety or sleep. 30 capsule 2    hyoscyamine (LEVSIN/SL) 125 MCG sublingual tablet TAKE 2 TABLETS BY MOUTH 3 TIMES DAILY AS NEEDED (BLOATING) 40 tablet 4    meloxicam (MOBIC) 15 MG tablet Take 1 tablet by mouth daily 20 tablet 0    azelastine (ASTELIN) 0.1 % nasal spray 1 spray by Nasal route 2 times daily Use in each nostril as directed      Cholecalciferol (VITAMIN D) 2000 units CAPS capsule Take by mouth      topiramate (TOPAMAX) 25 MG tablet Take 25 mg by mouth daily       estradiol (ESTRACE) 1 MG tablet Take 1 mg by mouth daily       polyethylene glycol (GLYCOLAX) powder Take 1 g by mouth as needed       No current facility-administered medications for this visit. Allergies   Allergen Reactions    Iohexol Rash     This is IV Contrast    Sulfa Antibiotics Rash     Social History     Tobacco Use    Smoking status: Never Smoker    Smokeless tobacco: Never Used   Substance Use Topics    Alcohol use: Yes     Comment: occ      Family History   Problem Relation Age of Onset    Cancer Father         malt    Parkinsonism Father     Stomach Cancer Father         maltoma    Cancer Mother         lung    Lung Cancer Mother     Colon Cancer Neg Hx     Colon Polyps Neg Hx        Review of Systems   Constitutional: Negative for chills, fatigue and fever. HENT: Negative for congestion, ear pain, nosebleeds, postnasal drip and sore throat.     Respiratory: Negative for cough, chest tightness and wheezing. Cardiovascular: Negative for chest pain, palpitations and leg swelling. Gastrointestinal: Negative for abdominal pain and constipation. Genitourinary: Negative for dysuria and urgency. Musculoskeletal: Negative. Negative for arthralgias. Skin: Negative for rash. Neurological: Negative for dizziness and headaches. Psychiatric/Behavioral: The patient is nervous/anxious. Vitals:    03/01/22 1447   BP: 110/70   Site: Left Upper Arm   Position: Sitting   Cuff Size: Large Adult   Pulse: 76   Resp: 18   SpO2: 100%   Weight: 139 lb (63 kg)   Height: 5' 6\" (1.676 m)     Body mass index is 22.44 kg/m². Physical Exam  Constitutional:       Appearance: She is well-developed. HENT:      Right Ear: External ear normal.      Left Ear: External ear normal.      Mouth/Throat:      Pharynx: No oropharyngeal exudate. Eyes:      Pupils: Pupils are equal, round, and reactive to light. Comments: Stye RT lower eyelid   Neck:      Thyroid: No thyromegaly. Vascular: No JVD. Cardiovascular:      Rate and Rhythm: Normal rate. Heart sounds: Normal heart sounds. No murmur heard. Pulmonary:      Effort: No respiratory distress. Breath sounds: Normal breath sounds. No wheezing or rales. Chest:      Chest wall: No tenderness. Abdominal:      General: Bowel sounds are normal.      Palpations: Abdomen is soft. Musculoskeletal:      Cervical back: Neck supple. Lymphadenopathy:      Cervical: No cervical adenopathy. Skin:     Findings: No rash. Lab Review   No visits with results within 2 Month(s) from this visit.    Latest known visit with results is:   Office Visit on 06/02/2021   Component Date Value    Alcohol, Urine 06/02/2021 na     Amphetamine Screen, Urine 06/02/2021 neg     Barbiturate Screen, Urine 06/02/2021 neg     Benzodiazepine Screen, U* 06/02/2021 POS     Buprenorphine Urine 06/02/2021 neg     Cocaine Metabolite Scree* 06/02/2021 neg     FENTANYL SCREEN, URINE 06/02/2021 na     Gabapentin Screen, Urine 06/02/2021 na     MDMA, Urine 06/02/2021 neg     Methadone Screen, Urine 06/02/2021 neg     Methamphetamine, Urine 06/02/2021 neg     Opiate Scrn, Ur 06/02/2021 neg     Oxycodone Screen, Ur 06/02/2021 neg     PCP Screen, Urine 06/02/2021 neg     Propoxyphene Screen, Diogenes Hamilton* 06/02/2021 na     Synthetic Cannabinoids (* 06/02/2021 na     THC Screen, Urine 06/02/2021 neg     Tramadol Scrn, Ur 59/50/8411 na     Tricyclic Antidepressant* 58/55/1414 na            ASSESSMENT/PLAN:    Generalized anxiety disorder-   refill Pristiq 50 ,   seems to be doing well        Primary insomnia, refill temazepam she takes 30 mg every evening and has been doing this for years  Marimar Headley reviewed  Marimar Headley was reviewed  On exam today and as per discussions with the patient today there is no evidence of adverse events such as cognitive impairment, sedation, constipation or falls related to prescribed medications. There is also no evidence of aberrant behavior like lost prescriptions or early refill requests or multiple prescribers for controlled substances.     Patient  was advised NOT to attempt to drive a motor vehichle or operate any heavy machinery within 6 hrs of taking the presribed medication - temazepam    Ricky 2/2022    Stye RT lower eyelid  RX tobramycin eyedrops sent to the pharmacy           No orders of the defined types were placed in this encounter. New Prescriptions    No medications on file         No follow-ups on file. There are no Patient Instructions on file for this visit. EMR Dragon/transcription disclaimer:Significant part of this  encounter note is electronic transcription/translationof spoken language to printed text. The electronic translation of spoken language may be erroneous, or at times, nonsensical words or phrases may be inadvertently transcribed.  Although I have reviewed the note for sucherrors, some may still exist.

## 2022-04-19 RX ORDER — MELOXICAM 15 MG/1
TABLET ORAL
Qty: 20 TABLET | Refills: 0 | Status: SHIPPED | OUTPATIENT
Start: 2022-04-19 | End: 2022-06-07 | Stop reason: CLARIF

## 2022-06-02 DIAGNOSIS — Z79.899 MEDICATION MANAGEMENT: Primary | ICD-10-CM

## 2022-06-03 DIAGNOSIS — E55.9 VITAMIN D DEFICIENCY: ICD-10-CM

## 2022-06-03 DIAGNOSIS — Z00.00 ANNUAL PHYSICAL EXAM: ICD-10-CM

## 2022-06-03 DIAGNOSIS — Z79.899 MEDICATION MANAGEMENT: ICD-10-CM

## 2022-06-03 DIAGNOSIS — F51.01 PRIMARY INSOMNIA: ICD-10-CM

## 2022-06-03 DIAGNOSIS — M85.89 OSTEOPENIA OF MULTIPLE SITES: ICD-10-CM

## 2022-06-03 DIAGNOSIS — M54.2 CERVICALGIA: ICD-10-CM

## 2022-06-03 LAB
ALBUMIN SERPL-MCNC: 4.2 G/DL (ref 3.5–5.2)
ALP BLD-CCNC: 77 U/L (ref 35–104)
ALT SERPL-CCNC: 20 U/L (ref 5–33)
AMPHETAMINE SCREEN, URINE: NEGATIVE
ANION GAP SERPL CALCULATED.3IONS-SCNC: 12 MMOL/L (ref 7–19)
AST SERPL-CCNC: 22 U/L (ref 5–32)
BACTERIA: NEGATIVE /HPF
BARBITURATE SCREEN URINE: NEGATIVE
BASOPHILS ABSOLUTE: 0 K/UL (ref 0–0.2)
BASOPHILS RELATIVE PERCENT: 0.5 % (ref 0–1)
BENZODIAZEPINE SCREEN, URINE: POSITIVE
BILIRUB SERPL-MCNC: 0.6 MG/DL (ref 0.2–1.2)
BILIRUBIN URINE: NEGATIVE
BLOOD, URINE: NEGATIVE
BUN BLDV-MCNC: 12 MG/DL (ref 6–20)
CALCIUM SERPL-MCNC: 9.5 MG/DL (ref 8.6–10)
CANNABINOID SCREEN URINE: NEGATIVE
CHLORIDE BLD-SCNC: 103 MMOL/L (ref 98–111)
CHOLESTEROL, TOTAL: 198 MG/DL (ref 160–199)
CLARITY: CLEAR
CO2: 25 MMOL/L (ref 22–29)
COCAINE METABOLITE SCREEN URINE: NEGATIVE
COLOR: YELLOW
CREAT SERPL-MCNC: 0.7 MG/DL (ref 0.5–0.9)
CRYSTALS, UA: ABNORMAL /HPF
EOSINOPHILS ABSOLUTE: 0.1 K/UL (ref 0–0.6)
EOSINOPHILS RELATIVE PERCENT: 1.7 % (ref 0–5)
EPITHELIAL CELLS, UA: 2 /HPF (ref 0–5)
GFR AFRICAN AMERICAN: >59
GFR NON-AFRICAN AMERICAN: >60
GLUCOSE BLD-MCNC: 98 MG/DL (ref 74–109)
GLUCOSE URINE: NEGATIVE MG/DL
HCT VFR BLD CALC: 45.1 % (ref 37–47)
HDLC SERPL-MCNC: 95 MG/DL (ref 65–121)
HEMOGLOBIN: 14.2 G/DL (ref 12–16)
HYALINE CASTS: 0 /HPF (ref 0–8)
IMMATURE GRANULOCYTES #: 0 K/UL
KETONES, URINE: NEGATIVE MG/DL
LDL CHOLESTEROL CALCULATED: 88 MG/DL
LEUKOCYTE ESTERASE, URINE: ABNORMAL
LYMPHOCYTES ABSOLUTE: 2.2 K/UL (ref 1.1–4.5)
LYMPHOCYTES RELATIVE PERCENT: 36.6 % (ref 20–40)
Lab: ABNORMAL
MCH RBC QN AUTO: 30.5 PG (ref 27–31)
MCHC RBC AUTO-ENTMCNC: 31.5 G/DL (ref 33–37)
MCV RBC AUTO: 97 FL (ref 81–99)
MONOCYTES ABSOLUTE: 0.6 K/UL (ref 0–0.9)
MONOCYTES RELATIVE PERCENT: 9.4 % (ref 0–10)
NEUTROPHILS ABSOLUTE: 3.1 K/UL (ref 1.5–7.5)
NEUTROPHILS RELATIVE PERCENT: 51.6 % (ref 50–65)
NITRITE, URINE: NEGATIVE
OPIATE SCREEN URINE: NEGATIVE
PDW BLD-RTO: 14.6 % (ref 11.5–14.5)
PH UA: 7 (ref 5–8)
PLATELET # BLD: 194 K/UL (ref 130–400)
PMV BLD AUTO: 11.6 FL (ref 9.4–12.3)
POTASSIUM SERPL-SCNC: 4.3 MMOL/L (ref 3.5–5)
PROTEIN UA: NEGATIVE MG/DL
RBC # BLD: 4.65 M/UL (ref 4.2–5.4)
RBC UA: 0 /HPF (ref 0–4)
SODIUM BLD-SCNC: 140 MMOL/L (ref 136–145)
SPECIFIC GRAVITY UA: 1.01 (ref 1–1.03)
TOTAL PROTEIN: 7.1 G/DL (ref 6.6–8.7)
TRIGL SERPL-MCNC: 77 MG/DL (ref 0–149)
TSH SERPL DL<=0.05 MIU/L-ACNC: 2.6 UIU/ML (ref 0.27–4.2)
UROBILINOGEN, URINE: 0.2 E.U./DL
VITAMIN D 25-HYDROXY: 63.3 NG/ML
WBC # BLD: 6 K/UL (ref 4.8–10.8)
WBC UA: 5 /HPF (ref 0–5)

## 2022-06-07 ENCOUNTER — OFFICE VISIT (OUTPATIENT)
Dept: INTERNAL MEDICINE | Age: 57
End: 2022-06-07
Payer: COMMERCIAL

## 2022-06-07 VITALS
HEART RATE: 78 BPM | RESPIRATION RATE: 18 BRPM | BODY MASS INDEX: 22.02 KG/M2 | OXYGEN SATURATION: 98 % | DIASTOLIC BLOOD PRESSURE: 70 MMHG | HEIGHT: 66 IN | WEIGHT: 137 LBS | SYSTOLIC BLOOD PRESSURE: 110 MMHG

## 2022-06-07 DIAGNOSIS — M85.89 OSTEOPENIA OF MULTIPLE SITES: ICD-10-CM

## 2022-06-07 DIAGNOSIS — Z00.00 ANNUAL PHYSICAL EXAM: Primary | ICD-10-CM

## 2022-06-07 DIAGNOSIS — Z12.31 ENCOUNTER FOR SCREENING MAMMOGRAM FOR BREAST CANCER: ICD-10-CM

## 2022-06-07 DIAGNOSIS — R06.09 EXERTIONAL DYSPNEA: ICD-10-CM

## 2022-06-07 DIAGNOSIS — E55.9 VITAMIN D DEFICIENCY: ICD-10-CM

## 2022-06-07 DIAGNOSIS — Z78.0 MENOPAUSE: ICD-10-CM

## 2022-06-07 DIAGNOSIS — R94.31 ABNORMAL EKG: ICD-10-CM

## 2022-06-07 DIAGNOSIS — F51.01 PRIMARY INSOMNIA: ICD-10-CM

## 2022-06-07 DIAGNOSIS — R07.89 CHEST PRESSURE: ICD-10-CM

## 2022-06-07 PROCEDURE — 99396 PREV VISIT EST AGE 40-64: CPT | Performed by: INTERNAL MEDICINE

## 2022-06-07 PROCEDURE — 99213 OFFICE O/P EST LOW 20 MIN: CPT | Performed by: INTERNAL MEDICINE

## 2022-06-07 PROCEDURE — 93000 ELECTROCARDIOGRAM COMPLETE: CPT | Performed by: INTERNAL MEDICINE

## 2022-06-07 RX ORDER — TEMAZEPAM 30 MG/1
CAPSULE ORAL
Qty: 30 CAPSULE | Refills: 2 | Status: SHIPPED | OUTPATIENT
Start: 2022-06-07 | End: 2022-09-01 | Stop reason: SDUPTHER

## 2022-06-07 RX ORDER — DESVENLAFAXINE 50 MG/1
50 TABLET, EXTENDED RELEASE ORAL DAILY
Qty: 30 TABLET | Refills: 5 | Status: SHIPPED | OUTPATIENT
Start: 2022-06-07

## 2022-06-07 ASSESSMENT — ENCOUNTER SYMPTOMS
CHEST TIGHTNESS: 1
COUGH: 0
ABDOMINAL PAIN: 0
WHEEZING: 0
CONSTIPATION: 0
SORE THROAT: 0
SHORTNESS OF BREATH: 1

## 2022-06-07 ASSESSMENT — PATIENT HEALTH QUESTIONNAIRE - PHQ9
SUM OF ALL RESPONSES TO PHQ9 QUESTIONS 1 & 2: 0
SUM OF ALL RESPONSES TO PHQ QUESTIONS 1-9: 0
1. LITTLE INTEREST OR PLEASURE IN DOING THINGS: 0
2. FEELING DOWN, DEPRESSED OR HOPELESS: 0

## 2022-06-07 NOTE — PROGRESS NOTES
Chief Complaint:   Kyung Masters is a 64 y.o. female who presents forcomplete physical exam.    History of Present Illness:      Kyung Masters is a 64 y.o. female who presents todayfor wellness visit AND follow up on her chronic medical conditions as noted below.       Patient Active Problem List    Diagnosis Date Noted    Exposure to influenza 08/29/2019    Status post dilatation of esophageal stricture 04/22/2019    Primary insomnia 07/17/2018    Osteopenia of multiple sites 07/17/2018     3/2018  * lumbar spine average T score is -1.45, she does have T score of -2.2 at L4  * Left hip T score -1.8, neck -1.9,troch -2.1  * Right hip total -1.6, neck -1.9,troch -1.8  * Radius total -1.5  5/2020 Lumbar T score -1.7( L4 -2.4)/ hip bilaterally -1.5/ -1.3        Orthostatic dizziness 07/17/2018    Generalized anxiety disorder 04/17/2018    Menopause ovarian failure 04/17/2018       Past Medical History:   Diagnosis Date    Allergic rhinitis     Anxiety     Arthritis     Osetopenia    Irritable bowel syndrome     Irritable bowel syndrome (IBS)     Sinus infection        Past Surgical History:   Procedure Laterality Date    BLADDER SURGERY  2013    vaginal tape    COLONOSCOPY  10/26/2015    Dr Star Garcia AP, active colitis    COLONOSCOPY N/A 02/09/2021    Dr Denice Becker, Int Hemorrhoids Grade 1, 5 year recall    HYSTERECTOMY      one ovary left    OVARY REMOVAL Right     SINUS SURGERY      TONSILLECTOMY      UPPER GASTROINTESTINAL ENDOSCOPY  10/16/2015    Dr Bob Dillard-w/balloon dil-18mm- Hiatal hernia, GEJ stricture, erythema in mid to distal esophagus-    UPPER GASTROINTESTINAL ENDOSCOPY  08/23/2017    Dr Guerita An-w/balloon [id number] hernia, normal path    UPPER GASTROINTESTINAL ENDOSCOPY  03/15/2019    Dr Bob Dillard-w/balloon [id number] hernia, GEJ stricture, reflux    UPPER GASTROINTESTINAL ENDOSCOPY N/A 01/27/2020 Dr Ewa Javed, neg for GERD    UPPER GASTROINTESTINAL ENDOSCOPY N/A 01/27/2020    Dr Ewa Javed, [place] for GERD    WISDOM TOOTH EXTRACTION         Current Outpatient Medications   Medication Sig Dispense Refill    desvenlafaxine succinate (PRISTIQ) 50 MG TB24 extended release tablet Take 1 tablet by mouth daily 30 tablet 5    temazepam (RESTORIL) 30 MG capsule Take 1 capsule by mouth nightly as needed for anxiety or sleep. 30 capsule 2    hyoscyamine (LEVSIN/SL) 125 MCG sublingual tablet TAKE 2 TABLETS BY MOUTH 3 TIMES DAILY AS NEEDED (BLOATING) 60 tablet 2    azelastine (ASTELIN) 0.1 % nasal spray 1 spray by Nasal route 2 times daily Use in each nostril as directed      Cholecalciferol (VITAMIN D) 2000 units CAPS capsule Take by mouth      topiramate (TOPAMAX) 25 MG tablet Take 25 mg by mouth daily       estradiol (ESTRACE) 1 MG tablet Take 1 mg by mouth daily       polyethylene glycol (GLYCOLAX) powder Take 1 g by mouth as needed       No current facility-administered medications for this visit.      Allergies   Allergen Reactions    Iohexol Rash     This is IV Contrast    Sulfa Antibiotics Rash       Social History     Socioeconomic History    Marital status:      Spouse name: None    Number of children: 3    Years of education: None    Highest education level: None   Occupational History    Occupation: Retired    Tobacco Use    Smoking status: Never Smoker    Smokeless tobacco: Never Used   Vaping Use    Vaping Use: Never used   Substance and Sexual Activity    Alcohol use: Yes     Comment: occ    Drug use: No    Sexual activity: None   Other Topics Concern    None   Social History Narrative    He was  but  recently remarried and moved to [place] 11/2017, she has 3 adult children ( 2 adopted)     Social Determinants of Health     Financial Resource Strain: Low Risk     Difficulty of Paying Living Expenses: Not hard at all   Food Insecurity: No Food Insecurity    Worried About Running Out of Food in the Last Year: Never true    Ran Out of Food in the Last Year: Never true   Transportation Needs:     Lack of Transportation (Medical): Not on file    Lack of Transportation (Non-Medical):  Not on file   Physical Activity:     Days of Exercise per Week: Not on file    Minutes of Exercise per Session: Not on file   Stress:     Feeling of Stress : Not on file   Social Connections:     Frequency of Communication with Friends and Family: Not on file    Frequency of Social Gatherings with Friends and Family: Not on file    Attends Orthodoxy Services: Not on file    Active Member of [address] [place] [address] [employer] or Organizations: Not on file    Attends Club or Organization Meetings: Not on file    Marital Status: Not on file   Intimate Partner Violence:     Fear of Current or Ex-Partner: Not on file    Emotionally Abused: Not on file    Physically Abused: Not on file    Sexually Abused: Not on file   Housing Stability:     Unable to Pay for Housing in the Last Year: Not on file    Number of [place] in the Last Year: Not on file    Unstable Housing in the Last Year: Not on file     Family History   Problem Relation Age of Onset    Cancer Father         malt    Parkinsonism Father     Stomach Cancer Father         maltoma    Cancer Mother         lung    Lung Cancer Mother     Colon Cancer Neg Hx     Colon Polyps Neg Hx           Past Surgical History:   Procedure Laterality Date    BLADDER SURGERY  2013    vaginal tape    COLONOSCOPY  10/26/2015    Dr Los Cabral AP, active colitis    COLONOSCOPY N/A 02/09/2021    Dr Lory Duane, Int Hemorrhoids Grade 1, 5 year recall    HYSTERECTOMY      one ovary left    OVARY REMOVAL Right     SINUS SURGERY      TONSILLECTOMY      UPPER GASTROINTESTINAL ENDOSCOPY  10/16/2015    Dr Adriana Zaldivaru-w/balloon dil-18mm- Hiatal hernia, GEJ stricture, erythema in mid to distal esophagus-    UPPER GASTROINTESTINAL ENDOSCOPY  08/23/2017    Dr Daniel An-w/balloon [id number] hernia, normal path    UPPER GASTROINTESTINAL ENDOSCOPY  03/15/2019    Dr Tuan Dillard-w/balloon [id number] hernia, GEJ stricture, reflux    UPPER GASTROINTESTINAL ENDOSCOPY N/A 01/27/2020    Dr Angle Goldsmith, neg for GERD    UPPER GASTROINTESTINAL ENDOSCOPY N/A 01/27/2020    Dr Angle Goldsmith, neg for GERD    WISDOM TOOTH EXTRACTION           Lab Review   Orders Only on 06/03/2022   Component Date Value    Amphetamine Screen, Urine 06/03/2022 Negative     Barbiturate Screen, Ur 06/03/2022 Negative     Benzodiazepine Screen, U* 06/03/2022 Positive*    Cannabinoid Scrn, Ur 06/03/2022 Negative     Cocaine Metabolite Scree* 06/03/2022 Negative     Opiate Scrn, Ur 06/03/2022 Negative     Drug Screen Comment: 06/03/2022 see below     Vit D, 25-Hydroxy 06/03/2022 63.3     TSH 06/03/2022 2.600     Color, UA 06/03/2022 YELLOW     Clarity, UA 06/03/2022 Clear     Glucose, Ur 06/03/2022 Negative     Bilirubin Urine 06/03/2022 Negative     Ketones, Urine 06/03/2022 Negative     Specific Gravity, UA 06/03/2022 1.011     Blood, Urine 06/03/2022 Negative     pH, UA 06/03/2022 7.0     Protein, UA 06/03/2022 Negative     Urobilinogen, Urine 06/03/2022 0.2     Nitrite, Urine 06/03/2022 Negative     Leukocyte Esterase, Urine 06/03/2022 MODERATE*    Cholesterol, Total 06/03/2022 198     Triglycerides 06/03/2022 77     HDL 06/03/2022 95     LDL Calculated 06/03/2022 88     Sodium 06/03/2022 140     Potassium 06/03/2022 4.3     Chloride 06/03/2022 103     CO2 06/03/2022 25     Anion Gap 06/03/2022 12     Glucose 06/03/2022 98     BUN 06/03/2022 12     CREATININE 06/03/2022 0.7     GFR Non- 06/03/2022 >60     GFR  06/03/2022 >59     Calcium 06/03/2022 9.5     Total Protein 06/03/2022 7.1     Albumin 137 lb (62.1 kg)   03/01/22 139 lb (63 kg)   11/29/21 137 lb (62.1 kg)   Body mass index is 22.11 kg/m². BP Readings from Last 3 Encounters:   06/07/22 110/70   03/01/22 110/70   11/29/21 110/68       Physical Exam  Constitutional:       Appearance: She is well-developed. HENT:      Right Ear: External ear normal.      Left Ear: External ear normal.      Mouth/Throat:      Pharynx: No oropharyngeal exudate. Eyes:      Conjunctiva/sclera: Conjunctivae normal.      Pupils: Pupils are equal, round, and reactive to light. Neck:      Thyroid: No thyromegaly. Vascular: No JVD. Cardiovascular:      Rate and Rhythm: Normal rate. Heart sounds: Normal heart sounds. No murmur heard. Pulmonary:      Effort: No respiratory distress. Breath sounds: Normal breath sounds. No wheezing or rales. Chest:      Chest wall: No tenderness. Abdominal:      General: Bowel sounds are normal.      Palpations: Abdomen is soft. Musculoskeletal:      Cervical back: Neck supple. Lymphadenopathy:      Cervical: No cervical adenopathy. Skin:     Findings: No rash.            ASSESSMENT/PLAN    Annual physical exam  *Screening mammogram   * BD-  see below  * PAP 2020  *Colonoscopy 2/2021-repeat 5 yrs     Vit D level now 63 in 6/2022  Cont vitamin D 2000 iu daily  ++ Osteopenia  3/2018  * lumbar spine average T score is -1.45, she does have T score of -2.2 at L4  * Left hip T score -1.8, neck -1.9,troch -2.1  * Right hip total -1.6, neck -1.9,troch -1.8  * Radius total -1.5  5/2020 Lumbar T score -1.7( L4 -2.4)/ hip bilaterally -1.5/ -1.3  BD plan 5/2022       Generalized anxiety disorder-   refill Pristiq 50 ,   seems to be doing well        Primary insomnia, refill temazepam she takes 30 mg every evening and has been doing this for years  Dellis Riding reviewed  Dellis Riding was reviewed  On exam today and as per discussions with the patient today there is no evidence of adverse events such as cognitive impairment, sedation, constipation or falls related to prescribed medications. There is also no evidence of aberrant behavior like lost prescriptions or early refill requests or multiple prescribers for controlled substances.     Patient  was advised NOT to attempt to drive a motor vehichle or operate any heavy machinery within 6 hrs of taking the presribed medication - temazepam      HR gets elevated with peloton exercise bike  Her HR gets elevated with exercises and gets chest pressure episodes with exercise  Obtain electrocardiogram-KG interpretation nonspecific ST-T changes poor R wave progression and negative precordial T waves  There is some change compared to 12/2020, poor R wave progression compared to prior electrocardiogram  Will need further evaluation with a stress echocardiogram  Orders placed      Pt  complains of some exercise-induced shortness of breath  She uses albuterol inhaler at times at times it helps (history of exercise-induced asthma in the past)  Chest pressure seems to be independent complaint from exercise-induced shortness of breath and needs evaluation as above  Use albuterol inhaler as needed recommended  Orders Placed This Encounter   Procedures    ELYSSA DIGITAL SCREEN W OR WO CAD BILATERAL    DEXA BONE DENSITY 2 SITES    CBC with Auto Differential    Comprehensive Metabolic Panel    Lipid Panel    Vitamin D 25 Hydroxy    Urinalysis    TSH    EKG 12 Lead    ECHO Stress Test     New Prescriptions    No medications on file      There are no Patient Instructions on file for this visit. No follow-ups on file. EMR Dragon/transcription disclaimer:Significant part of this  encounter note is electronic transcription/translation of spoken language to printed text. The electronic translation of spoken language may beerroneous, or at times, nonsensical words or phrases may be inadvertently transcribed.  Although I have reviewed the note for such errors, some may still exist.

## 2022-06-09 ENCOUNTER — HOSPITAL ENCOUNTER (OUTPATIENT)
Dept: WOMENS IMAGING | Age: 57
Discharge: HOME | End: 2022-06-09
Payer: COMMERCIAL

## 2022-06-09 DIAGNOSIS — Z78.0 MENOPAUSE: ICD-10-CM

## 2022-06-09 DIAGNOSIS — Z12.31 ENCOUNTER FOR SCREENING MAMMOGRAM FOR BREAST CANCER: ICD-10-CM

## 2022-06-09 DIAGNOSIS — M85.89 OSTEOPENIA OF MULTIPLE SITES: ICD-10-CM

## 2022-06-09 PROCEDURE — 77067 SCR MAMMO BI INCL CAD: CPT | Performed by: RADIOLOGY

## 2022-06-09 PROCEDURE — 77080 DXA BONE DENSITY AXIAL: CPT | Performed by: RADIOLOGY

## 2022-06-09 PROCEDURE — 77080 DXA BONE DENSITY AXIAL: CPT

## 2022-06-09 PROCEDURE — 77063 BREAST TOMOSYNTHESIS BI: CPT | Performed by: RADIOLOGY

## 2022-06-09 PROCEDURE — 77063 BREAST TOMOSYNTHESIS BI: CPT

## 2022-09-01 DIAGNOSIS — F51.01 PRIMARY INSOMNIA: ICD-10-CM

## 2022-09-01 RX ORDER — TEMAZEPAM 30 MG/1
CAPSULE ORAL
Qty: 30 CAPSULE | Refills: 0 | Status: SHIPPED | OUTPATIENT
Start: 2022-09-01 | End: 2022-09-08 | Stop reason: SDUPTHER

## 2022-09-01 NOTE — TELEPHONE ENCOUNTER
[facility] called requesting a refill of the below medication which has been pended for you:     Requested Prescriptions     Pending Prescriptions Disp Refills    temazepam (RESTORIL) 30 MG capsule 30 capsule 0     Sig: Take 1 capsule by mouth nightly as needed for anxiety or sleep.        Last Appointment Date: 6/7/2022  Next Appointment Date: 9/8/2022    Allergies   Allergen Reactions    Iohexol Rash     This is IV Contrast    Sulfa Antibiotics Rash

## 2022-09-08 ENCOUNTER — OFFICE VISIT (OUTPATIENT)
Dept: INTERNAL MEDICINE | Age: 57
End: 2022-09-08
Payer: COMMERCIAL

## 2022-09-08 VITALS
DIASTOLIC BLOOD PRESSURE: 68 MMHG | HEART RATE: 69 BPM | SYSTOLIC BLOOD PRESSURE: 128 MMHG | OXYGEN SATURATION: 97 % | BODY MASS INDEX: 22.02 KG/M2 | WEIGHT: 137 LBS | HEIGHT: 66 IN | RESPIRATION RATE: 18 BRPM

## 2022-09-08 DIAGNOSIS — K58.1 IRRITABLE BOWEL SYNDROME WITH CONSTIPATION: ICD-10-CM

## 2022-09-08 DIAGNOSIS — F51.01 PRIMARY INSOMNIA: ICD-10-CM

## 2022-09-08 DIAGNOSIS — F41.1 GENERALIZED ANXIETY DISORDER: Primary | ICD-10-CM

## 2022-09-08 PROCEDURE — 99213 OFFICE O/P EST LOW 20 MIN: CPT | Performed by: INTERNAL MEDICINE

## 2022-09-08 RX ORDER — TEMAZEPAM 30 MG/1
CAPSULE ORAL
Qty: 30 CAPSULE | Refills: 2 | Status: SHIPPED | OUTPATIENT
Start: 2022-09-08 | End: 2022-12-28

## 2022-09-08 SDOH — ECONOMIC STABILITY: FOOD INSECURITY: WITHIN THE PAST 12 MONTHS, THE FOOD YOU BOUGHT JUST DIDN'T LAST AND YOU DIDN'T HAVE MONEY TO GET MORE.: NEVER TRUE

## 2022-09-08 SDOH — ECONOMIC STABILITY: FOOD INSECURITY: WITHIN THE PAST 12 MONTHS, YOU WORRIED THAT YOUR FOOD WOULD RUN OUT BEFORE YOU GOT MONEY TO BUY MORE.: NEVER TRUE

## 2022-09-08 ASSESSMENT — ENCOUNTER SYMPTOMS
COUGH: 0
CHEST TIGHTNESS: 0
CONSTIPATION: 0
SORE THROAT: 0
WHEEZING: 0
ABDOMINAL PAIN: 0

## 2022-09-08 ASSESSMENT — SOCIAL DETERMINANTS OF HEALTH (SDOH): HOW HARD IS IT FOR YOU TO PAY FOR THE VERY BASICS LIKE FOOD, HOUSING, MEDICAL CARE, AND HEATING?: NOT HARD AT ALL

## 2022-09-08 NOTE — PROGRESS NOTES
Chief Complaint   Patient presents with    3 Month Follow-Up     History of presenting illness:  Luz Bull is a61 y.o. female who presents today for follow up on her chronic medical conditions as noted below.     Patient Active Problem List    Diagnosis Date Noted    Exposure to influenza 08/29/2019    Status post dilatation of esophageal stricture 04/22/2019    Primary insomnia 07/17/2018    Osteopenia of multiple sites 07/17/2018     Overview Note:     3/2018  * lumbar spine average T score is -1.45, she does have T score of -2.2 at L4  * Left hip T score -1.8, neck -1.9,troch -2.1  * Right hip total -1.6, neck -1.9,troch -1.8  * Radius total -1.5  5/2020 Lumbar T score -1.7( L4 -2.4)/ hip bilaterally -1.5/ -1.3   6/2022 - hip neck -.2.2/ L spine -2.0        Orthostatic dizziness 07/17/2018    Generalized anxiety disorder 04/17/2018    Menopause ovarian failure 04/17/2018     Past Medical History:   Diagnosis Date    Allergic rhinitis     Anxiety     Arthritis     Osetopenia    Irritable bowel syndrome     Irritable bowel syndrome (IBS)     Sinus infection       Past Surgical History:   Procedure Laterality Date    BLADDER SURGERY  2013    vaginal tape    COLONOSCOPY  10/26/2015    Dr Alina Lopez AP, active colitis    COLONOSCOPY N/A 02/09/2021    Dr Magdy Hui, Int Hemorrhoids Grade 1, 5 year recall    HYSTERECTOMY (CERVIX STATUS UNKNOWN)      one ovary left    OVARY REMOVAL Right     SINUS SURGERY      TONSILLECTOMY      UPPER GASTROINTESTINAL ENDOSCOPY  10/16/2015    Dr Jane Dillard-w/balloon dil-18mm- Hiatal hernia, GEJ stricture, erythema in mid to distal esophagus-    UPPER GASTROINTESTINAL ENDOSCOPY  08/23/2017    Dr Stefanie An-w/balloon [id number] hernia, normal path    UPPER GASTROINTESTINAL ENDOSCOPY  03/15/2019    Dr Jane Dillard-w/balloon [id number] hernia, GEJ stricture, reflux    UPPER GASTROINTESTINAL ENDOSCOPY N/A 01/27/2020    Dr Chelsy Fleming, neg for GERD    UPPER GASTROINTESTINAL ENDOSCOPY N/A 01/27/2020    Dr Chelsy Fleming, neg for GERD    WISDOM TOOTH EXTRACTION       Current Outpatient Medications   Medication Sig Dispense Refill    temazepam (RESTORIL) 30 MG capsule Take 1 capsule by mouth nightly as needed for anxiety or sleep. 30 capsule 2    hyoscyamine (LEVSIN/SL) 125 MCG sublingual tablet TAKE 2 TABLETS BY MOUTH 3 TIMES DAILY AS NEEDED (BLOATING) 60 tablet 2    desvenlafaxine succinate (PRISTIQ) 50 MG TB24 extended release tablet Take 1 tablet by mouth daily 30 tablet 5    azelastine (ASTELIN) 0.1 % nasal spray 1 spray by Nasal route 2 times daily Use in each nostril as directed      Cholecalciferol (VITAMIN D) 2000 units CAPS capsule Take by mouth      topiramate (TOPAMAX) 25 MG tablet Take 25 mg by mouth daily       estradiol (ESTRACE) 1 MG tablet Take 1 mg by mouth daily       polyethylene glycol (GLYCOLAX) powder Take 1 g by mouth as needed       No current facility-administered medications for this visit. Allergies   Allergen Reactions    Iohexol Rash     This is IV Contrast    Sulfa Antibiotics Rash     Social History     Tobacco Use    Smoking status: Never    Smokeless tobacco: Never   Substance Use Topics    Alcohol use: Yes     Comment: occ      Family History   Problem Relation Age of Onset    Cancer Father         malt    Parkinsonism Father     Stomach Cancer Father         maltoma    Cancer Mother         lung    Lung Cancer Mother     Colon Cancer Neg Hx     Colon Polyps Neg Hx        Review of Systems   Constitutional:  Negative for chills, fatigue and fever. HENT:  Negative for congestion, ear pain, nosebleeds, postnasal drip and sore throat. Respiratory:  Negative for cough, chest tightness and wheezing. Cardiovascular:  Negative for chest pain, palpitations and leg swelling. Gastrointestinal:  Negative for abdominal pain and constipation. Genitourinary:  Negative for dysuria and urgency. Musculoskeletal: Negative. Negative for arthralgias. Skin:  Negative for rash. Neurological:  Negative for dizziness and headaches. Psychiatric/Behavioral: Negative. Vitals:    09/08/22 1018   BP: 128/68   Site: Left Upper Arm   Position: Sitting   Cuff Size: Small Adult   Pulse: 69   Resp: 18   SpO2: 97%   Weight: 137 lb (62.1 kg)   Height: 5' 6\" (1.676 m)     Body mass index is 22.11 kg/m². Physical Exam  Constitutional:       Appearance: She is well-developed. HENT:      Right Ear: External ear normal.      Left Ear: External ear normal.      Mouth/Throat:      Pharynx: No oropharyngeal exudate. Eyes:      Conjunctiva/sclera: Conjunctivae normal.      Pupils: Pupils are equal, round, and reactive to light. Neck:      Thyroid: No thyromegaly. Vascular: No JVD. Cardiovascular:      Rate and Rhythm: Normal rate. Heart sounds: Normal heart sounds. No murmur heard. Pulmonary:      Effort: No respiratory distress. Breath sounds: Normal breath sounds. No wheezing or rales. Chest:      Chest wall: No tenderness. Abdominal:      General: Bowel sounds are normal.      Palpations: Abdomen is soft. Musculoskeletal:      Cervical back: Neck supple. Lymphadenopathy:      Cervical: No cervical adenopathy. Skin:     Findings: No rash. Lab Review   No visits with results within 2 Month(s) from this visit.    Latest known visit with results is:   Orders Only on 06/03/2022   Component Date Value    Amphetamine Screen, Urine 06/03/2022 Negative     Barbiturate Screen, Ur 06/03/2022 Negative     Benzodiazepine Screen, U* 06/03/2022 Positive (A)    Cannabinoid Scrn, Ur 06/03/2022 Negative     Cocaine Metabolite Scree* 06/03/2022 Negative     Opiate Scrn, Ur 06/03/2022 Negative     Drug Screen Comment: 06/03/2022 see below     Vit D, 25-Hydroxy 06/03/2022 63.3     TSH 06/03/2022 2.600     Color, UA 06/03/2022 YELLOW     Clarity, UA 06/03/2022 Clear     Glucose, Ur 06/03/2022 Negative     Bilirubin Urine 06/03/2022 Negative     Ketones, Urine 06/03/2022 Negative     Specific Gravity, UA 06/03/2022 1.011     Blood, Urine 06/03/2022 Negative     pH, UA 06/03/2022 7.0     Protein, UA 06/03/2022 Negative     Urobilinogen, Urine 06/03/2022 0.2     Nitrite, Urine 06/03/2022 Negative     Leukocyte Esterase, Urine 06/03/2022 MODERATE (A)    Cholesterol, Total 06/03/2022 198     Triglycerides 06/03/2022 77     HDL 06/03/2022 95     LDL Calculated 06/03/2022 88     Sodium 06/03/2022 140     Potassium 06/03/2022 4.3     Chloride 06/03/2022 103     CO2 06/03/2022 25     Anion Gap 06/03/2022 12     Glucose 06/03/2022 98     BUN 06/03/2022 12     Creatinine 06/03/2022 0.7     GFR Non- 06/03/2022 >60     GFR  06/03/2022 >59     Calcium 06/03/2022 9.5     Total Protein 06/03/2022 7.1     Albumin 06/03/2022 4.2     Total Bilirubin 06/03/2022 0.6     Alkaline Phosphatase 06/03/2022 77     ALT 06/03/2022 20     AST 06/03/2022 22     WBC 06/03/2022 6.0     RBC 06/03/2022 4.65     Hemoglobin 06/03/2022 14.2     Hematocrit 06/03/2022 45.1     MCV 06/03/2022 97.0     MCH 06/03/2022 30.5     MCHC 06/03/2022 31.5 (A)    RDW 06/03/2022 14.6 (A)    Platelets 08/03/4350 194     MPV 06/03/2022 11.6     Neutrophils % 06/03/2022 51.6     Lymphocytes % 06/03/2022 36.6     Monocytes % 06/03/2022 9.4     Eosinophils % 06/03/2022 1.7     Basophils % 06/03/2022 0.5     Neutrophils Absolute 06/03/2022 3.1     Immature Granulocytes # 06/03/2022 0.0     Lymphocytes Absolute 06/03/2022 2.2     Monocytes Absolute 06/03/2022 0.60     Eosinophils Absolute 06/03/2022 0.10     Basophils Absolute 06/03/2022 0.00     Bacteria, UA 06/03/2022 NEGATIVE (A)    Crystals, UA 06/03/2022 NEG (A)    Hyaline Casts, UA 06/03/2022 0     WBC, UA 06/03/2022 5     RBC, UA 06/03/2022 0     Epithelial Cells, UA 06/03/2022 2            ASSESSMENT/PLAN:          Generalized anxiety disorder-   refill Pristiq 50 ,   seems to be doing well        Primary insomnia, refill temazepam she takes 30 mg every evening and has been doing this for years  Luis Carlos Mtz reviewed  Luis Carlos Smith was reviewed  On exam today and as per discussions with the patient today there is no evidence of adverse events such as cognitive impairment, sedation, constipation or falls related to prescribed medications. There is also no evidence of aberrant behavior like lost prescriptions or early refill requests or multiple prescribers for controlled substances. Patient  was advised NOT to attempt to drive a motor vehichle or operate any heavy machinery within 6 hrs of taking the presribed medication - temazepam      Vit D level now 63 in 6/2022  Cont vitamin D 2000 iu daily  ++ Osteopenia  3/2018  * lumbar spine average T score is -1.45, she does have T score of -2.2 at L4  * Left hip T score -1.8, neck -1.9,troch -2.1  * Right hip total -1.6, neck -1.9,troch -1.8  * Radius total -1.5  5/2020 Lumbar T score -1.7( L4 -2.4)/ hip bilaterally -1.5/ -1.3  BD plan 5/2022    DW pt re: zone 2 exercise 40-50 min x 3 weekly        No orders of the defined types were placed in this encounter. New Prescriptions    No medications on file         No follow-ups on file. There are no Patient Instructions on file for this visit. EMR Dragon/transcription disclaimer:Significant part of this  encounter note is electronic transcription/translationof spoken language to printed text. The electronic translation of spoken language may be erroneous, or at times, nonsensical words or phrases may be inadvertently transcribed.  Although I have reviewed the note for sucherrors, some may still exist.

## 2022-10-05 ENCOUNTER — OFFICE VISIT (OUTPATIENT)
Dept: INTERNAL MEDICINE | Age: 57
End: 2022-10-05
Payer: COMMERCIAL

## 2022-10-05 VITALS
BODY MASS INDEX: 22.02 KG/M2 | OXYGEN SATURATION: 96 % | DIASTOLIC BLOOD PRESSURE: 60 MMHG | HEIGHT: 66 IN | WEIGHT: 137 LBS | SYSTOLIC BLOOD PRESSURE: 108 MMHG

## 2022-10-05 DIAGNOSIS — R21 RASH: Primary | ICD-10-CM

## 2022-10-05 DIAGNOSIS — L71.9 ACNE ROSACEA: ICD-10-CM

## 2022-10-05 PROCEDURE — 99213 OFFICE O/P EST LOW 20 MIN: CPT | Performed by: NURSE PRACTITIONER

## 2022-10-05 ASSESSMENT — ENCOUNTER SYMPTOMS
VOMITING: 0
ABDOMINAL DISTENTION: 0
EYE ITCHING: 0
BLOOD IN STOOL: 0
WHEEZING: 0
CONSTIPATION: 0
ABDOMINAL PAIN: 0
DIARRHEA: 0
COUGH: 0
NAUSEA: 0
COLOR CHANGE: 0
SHORTNESS OF BREATH: 0
SORE THROAT: 0
CHOKING: 0
EYE DISCHARGE: 0
TROUBLE SWALLOWING: 0
STRIDOR: 0

## 2022-10-05 NOTE — PROGRESS NOTES
[facility] PHYSICIAN SERVICES  [facility] INTERNAL MEDICINE  [address] [facility] [address] [place] [address]  Dept: [phone]  Dept Fax: [phone]: [phone]    Paolo Byrd (:  1965) is a 62 y.o. female,Established patient  with gomez, here for evaluation of the following chief complaint(s): Skin Problem (Started valtrex monday)      Paolo Byrd is a 62 y.o. female who presents today for her medical conditions/complaints as noted below.   Paolo Byrd is c/nathen Skin Problem (Started valtrex monday)        HPI:     Chief Complaint   Patient presents with    Skin Problem     Started valtrex monday     HPI    Redness and burning around there ight eyelid; this was on Friday   Then by  she had a linear rash down the left side of her face;  she has never had an issues like that;    Ocular rosacea; she uses triamcinolone cream to the eyes;  sees eye  on Friday;  he also gave her valtrex  in a over the phone consultation when she was out of town;  but really has made no difference     Past Medical History:   Diagnosis Date    Allergic rhinitis     Anxiety     Arthritis     Osetopenia    Irritable bowel syndrome     Irritable bowel syndrome (IBS)     Sinus infection       Past Surgical History:   Procedure Laterality Date    BLADDER SURGERY  2013    vaginal tape    COLONOSCOPY  10/26/2015    Dr Kolby Bustillo AP, active colitis    COLONOSCOPY N/A 2021    Dr Bebo Zuniga, Int Hemorrhoids Grade 1, 5 year recall    HYSTERECTOMY (CERVIX STATUS UNKNOWN)      one ovary left    OVARY REMOVAL Right     SINUS SURGERY      TONSILLECTOMY      UPPER GASTROINTESTINAL ENDOSCOPY  10/16/2015    Dr Reji Dillard-w/balloon dil-18mm- Hiatal hernia, GEJ stricture, erythema in mid to distal esophagus-    UPPER GASTROINTESTINAL ENDOSCOPY  2017    Dr Ced An-w/balloon [id number] hernia, normal path    UPPER GASTROINTESTINAL ENDOSCOPY  03/15/2019    [facility] [id number] hernia, GEJ stricture, reflux    UPPER GASTROINTESTINAL ENDOSCOPY N/A 01/27/2020    Dr Lobito Caputo, neg for GERD    UPPER GASTROINTESTINAL ENDOSCOPY N/A 01/27/2020    Dr Lobito Caputo, neg for GERD    WISDOM TOOTH EXTRACTION         Vitals 10/5/2022 9/8/2022 6/7/2022 3/1/2022 11/29/2021 52/70/8581   SYSTOLIC 002 088 [phone]   DIASTOLIC 60 68 70 70 68 75   Site - Left Upper Arm Left Upper Arm Left Upper Arm Left Upper Arm -   Position - Sitting Sitting Sitting Sitting -   Cuff Size - Small Adult Large Adult Large Adult Large Adult -   Pulse - 69 78 76 88 89   Temp - - - - - 97.6   Resp - 18 18 18 18 17   SpO2 96 97 98 100 100 98   Weight 137 lb 137 lb 137 lb 139 lb 137 lb 133 lb   Height 5' 6\" 5' 6\" 5' 6\" 5' 6\" 5' 6\" -   Body mass index 22.11 kg/m2 22.11 kg/m2 22.11 kg/m2 22.43 kg/m2 22.11 kg/m2 -   Some recent data might be hidden       Family History   Problem Relation Age of Onset    Cancer Father         malt    Parkinsonism Father     Stomach Cancer Father         maltoma    Cancer Mother         lung    Lung Cancer Mother     Colon Cancer Neg Hx     Colon Polyps Neg Hx        Social History     Tobacco Use    Smoking status: Never    Smokeless tobacco: Never   Substance Use Topics    Alcohol use: Yes     Comment: occ      Current Outpatient Medications   Medication Sig Dispense Refill    temazepam (RESTORIL) 30 MG capsule Take 1 capsule by mouth nightly as needed for anxiety or sleep.  30 capsule 2    hyoscyamine (LEVSIN/SL) 125 MCG sublingual tablet TAKE 2 TABLETS BY MOUTH 3 TIMES DAILY AS NEEDED (BLOATING) 60 tablet 2    desvenlafaxine succinate (PRISTIQ) 50 MG TB24 extended release tablet Take 1 tablet by mouth daily 30 tablet 5    azelastine (ASTELIN) 0.1 % nasal spray 1 spray by Nasal route 2 times daily Use in each nostril as directed      Cholecalciferol (VITAMIN D) 2000 units CAPS capsule Take by mouth topiramate (TOPAMAX) 25 MG tablet Take 25 mg by mouth daily       estradiol (ESTRACE) 1 MG tablet Take 1 mg by mouth daily       polyethylene glycol (GLYCOLAX) powder Take 1 g by mouth as needed       No current facility-administered medications for this visit.      Allergies   Allergen Reactions    Iohexol Rash     This is IV Contrast    Sulfa Antibiotics Rash       Health Maintenance   Topic Date Due    COVID-19 Vaccine (3 - Booster for Moderna series) 07/17/2021    Flu vaccine (1) 08/01/2022    Cervical cancer screen  01/23/2023    Depression Screen  06/07/2023    Breast cancer screen  06/09/2024    Colorectal Cancer Screen  02/09/2026    Lipids  06/03/2027    DTaP/Tdap/Td vaccine (2 - Td or Tdap) 11/15/2031    Shingles vaccine  Completed    Hepatitis C screen  Completed    HIV screen  Completed    Hepatitis A vaccine  Aged Out    Hepatitis B vaccine  Aged Out    Hib vaccine  Aged Out    Meningococcal (ACWY) vaccine  Aged Out    Pneumococcal 0-64 years Vaccine  Aged Out       No results found for: LABA1C  No results found for: PSA, PSADIA  TSH   Date Value Ref Range Status   06/03/2022 2.600 0.270 - 4.200 uIU/mL Final   ]  Lab Results   Component Value Date     06/03/2022    K 4.3 06/03/2022     06/03/2022    CO2 25 06/03/2022    BUN 12 06/03/2022    CREATININE 0.7 06/03/2022    GLUCOSE 98 06/03/2022    CALCIUM 9.5 06/03/2022    PROT 7.1 06/03/2022    LABALBU 4.2 06/03/2022    BILITOT 0.6 06/03/2022    ALKPHOS 77 06/03/2022    AST 22 06/03/2022    ALT 20 06/03/2022    LABGLOM >60 06/03/2022    GFRAA >59 06/03/2022     Lab Results   Component Value Date    CHOL 198 06/03/2022    CHOL 202 (H) 05/26/2021    CHOL 208 (H) 05/18/2020     Lab Results   Component Value Date    TRIG 77 06/03/2022    TRIG 74 05/26/2021    TRIG 192 (H) 05/18/2020     Lab Results   Component Value Date    HDL 95 06/03/2022    HDL 85 05/26/2021    HDL 84 05/18/2020     Lab Results   Component Value Date    LDLCALC 88 06/03/2022 LDLCALC 102 05/26/2021    LDLCALC 86 05/18/2020     Lab Results   Component Value Date/Time     06/03/2022 07:21 AM    K 4.3 06/03/2022 07:21 AM    K 3.9 12/30/2020 12:35 PM     06/03/2022 07:21 AM    CO2 25 06/03/2022 07:21 AM    BUN 12 06/03/2022 07:21 AM    CREATININE 0.7 06/03/2022 07:21 AM    GLUCOSE 98 06/03/2022 07:21 AM    CALCIUM 9.5 06/03/2022 07:21 AM      Lab Results   Component Value Date    WBC 6.0 06/03/2022    HGB 14.2 06/03/2022    HCT 45.1 06/03/2022    MCV 97.0 06/03/2022     06/03/2022    LYMPHOPCT 36.6 06/03/2022    RBC 4.65 06/03/2022    MCH 30.5 06/03/2022    MCHC 31.5 (L) 06/03/2022    RDW 14.6 (H) 06/03/2022     Lab Results   Component Value Date    VITD25 63.3 06/03/2022       Subjective:      Review of Systems   Constitutional:  Negative for fatigue, fever and unexpected weight change. HENT:  Negative for ear discharge, ear pain, mouth sores, sore throat and trouble swallowing. Eyes:  Negative for discharge, itching and visual disturbance. Respiratory:  Negative for cough, choking, shortness of breath, wheezing and stridor. Cardiovascular:  Negative for chest pain, palpitations and leg swelling. Gastrointestinal:  Negative for abdominal distention, abdominal pain, blood in stool, constipation, diarrhea, nausea and vomiting. Endocrine: Negative for cold intolerance, polydipsia and polyuria. Genitourinary:  Negative for difficulty urinating, dysuria, frequency and urgency. Musculoskeletal:  Negative for arthralgias and gait problem. Skin:  Positive for rash. Negative for color change. Allergic/Immunologic: Negative for food allergies and immunocompromised state. Neurological:  Negative for dizziness, tremors, syncope, speech difficulty, weakness and headaches. Hematological:  Negative for adenopathy. Does not bruise/bleed easily. Psychiatric/Behavioral:  Negative for confusion and hallucinations.       Objective:     Physical Exam  Constitutional:       General: She is not in acute distress. Appearance: She is well-developed. HENT:      Head: Normocephalic and atraumatic. Eyes:      General: No scleral icterus. Right eye: No discharge. Left eye: No discharge. Pupils: Pupils are equal, round, and reactive to light. Neck:      Thyroid: No thyromegaly. Vascular: No JVD. Cardiovascular:      Rate and Rhythm: Normal rate and regular rhythm. Heart sounds: Normal heart sounds. No murmur heard. Pulmonary:      Effort: Pulmonary effort is normal. No respiratory distress. Breath sounds: Normal breath sounds. No wheezing or rales. Abdominal:      General: Bowel sounds are normal. There is no distension. Palpations: Abdomen is soft. There is no mass. Tenderness: There is no abdominal tenderness. There is no guarding or rebound. Musculoskeletal:         General: No tenderness. Normal range of motion. Cervical back: Normal range of motion and neck supple. Skin:     General: Skin is warm and dry. Findings: No erythema or rash. Neurological:      Mental Status: She is alert and oriented to person, place, and time. Cranial Nerves: No cranial nerve deficit. Coordination: Coordination normal.      Deep Tendon Reflexes: Reflexes are normal and symmetric. Reflexes normal.   Psychiatric:         Mood and Affect: Mood is not depressed. Behavior: Behavior normal.         Thought Content: Thought content normal.         Judgment: Judgment normal.     /60   Ht 5' 6\" (1.676 m)   Wt 137 lb (62.1 kg)   SpO2 96%   BMI 22.11 kg/m²           Assessment:      Problem List       Acne rosacea     Around the eyes;   Sees opthamologist and uses triamcynolone cream           Rash - Primary     We will continue to use the triamcinolone and Benadryl cream on the left facial rash          Relevant Orders    MRSA DNA Probe, Nasal       Plan:        Patient given educational materials - see patient instructions. Discussed use, benefit, and side effects of prescribed medications. Allpatient questions answered. Pt voiced understanding. Reviewed health maintenance. Instructed to continue current medications, diet and exercise. Patient agreed with treatment plan. Follow up as directed. MEDICATIONS:  No orders of the defined types were placed in this encounter. ORDERS:  Orders Placed This Encounter   Procedures    MRSA DNA Probe, Nasal         Follow-up:  Return for have labs done prior to appt. PATIENT INSTRUCTIONS:  Patient Instructions    Rash to right eyelid    Rash to left side of face   Continue the triam on your eyes;  you can mix with a little benadryl cream onyour face a couple of times a day ;   we will get MRSA nasal swab;  as you are allergic to SUlfa, we will just wait for the swab resutls   Electronically signed by GRACIE Allan on 10/5/2022 at 2:56 PM    @    EMRDragon/transcription disclaimer:  Much of this encounter note is electronic transcription/translation of spoken language to printed texts. The electronic translation of spoken language may be erroneous, or at times,nonsensical words or phrases may be inadvertently transcribed.   Although I have reviewed the note for such errors, some may still exist.

## 2022-10-05 NOTE — PATIENT INSTRUCTIONS
Rash to right eyelid    Rash to left side of face   Continue the triam on your eyes;  you can mix with a little benadryl cream onyour face a couple of times a day ;   we will get MRSA nasal swab;  as you are allergic to SUlfa, we will just wait for the swab resutls

## 2022-10-06 DIAGNOSIS — R21 RASH: ICD-10-CM

## 2022-10-06 LAB — MRSA SCREEN RT-PCR: NOT DETECTED

## 2022-12-05 RX ORDER — DESVENLAFAXINE 50 MG/1
TABLET, EXTENDED RELEASE ORAL
Qty: 30 TABLET | Refills: 0 | Status: SHIPPED | OUTPATIENT
Start: 2022-12-05 | End: 2022-12-06 | Stop reason: SDUPTHER

## 2022-12-05 NOTE — TELEPHONE ENCOUNTER
Valerie Tran called requesting a refill of the below medication which has been pended for you:     Requested Prescriptions     Pending Prescriptions Disp Refills    desvenlafaxine succinate (PRISTIQ) 50 MG TB24 extended release tablet [Pharmacy Med Name: DESVENLAFAXINE  ER 50MG] 30 tablet 0     Sig: TAKE ONE TABLET BY MOUTH EVERY DAY       Last Appointment Date: 9/8/2022  Next Appointment Date: 12/6/2022    Allergies   Allergen Reactions    Iohexol Rash     This is IV Contrast    Sulfa Antibiotics Rash

## 2022-12-06 ENCOUNTER — OFFICE VISIT (OUTPATIENT)
Dept: INTERNAL MEDICINE | Age: 57
End: 2022-12-06
Payer: COMMERCIAL

## 2022-12-06 VITALS
SYSTOLIC BLOOD PRESSURE: 128 MMHG | RESPIRATION RATE: 18 BRPM | HEART RATE: 86 BPM | BODY MASS INDEX: 21.86 KG/M2 | WEIGHT: 136 LBS | OXYGEN SATURATION: 97 % | HEIGHT: 66 IN | DIASTOLIC BLOOD PRESSURE: 80 MMHG

## 2022-12-06 DIAGNOSIS — F41.1 GENERALIZED ANXIETY DISORDER: Primary | ICD-10-CM

## 2022-12-06 DIAGNOSIS — F51.01 PRIMARY INSOMNIA: ICD-10-CM

## 2022-12-06 PROCEDURE — 99213 OFFICE O/P EST LOW 20 MIN: CPT | Performed by: INTERNAL MEDICINE

## 2022-12-06 RX ORDER — DESVENLAFAXINE 50 MG/1
TABLET, EXTENDED RELEASE ORAL
Qty: 30 TABLET | Refills: 5 | Status: SHIPPED | OUTPATIENT
Start: 2022-12-06

## 2022-12-06 RX ORDER — TEMAZEPAM 30 MG/1
CAPSULE ORAL
Qty: 30 CAPSULE | Refills: 2 | Status: SHIPPED | OUTPATIENT
Start: 2022-12-06 | End: 2023-03-27

## 2022-12-06 ASSESSMENT — ENCOUNTER SYMPTOMS
SORE THROAT: 0
WHEEZING: 0
CONSTIPATION: 0
ABDOMINAL PAIN: 0
CHEST TIGHTNESS: 0
COUGH: 0

## 2022-12-06 NOTE — PROGRESS NOTES
Chief Complaint   Patient presents with    3 Month Follow-Up     History of presenting illness:  Yanira Haider is a64 y.o. female who presents today for follow up on her chronic medical conditions as noted below.     Patient Active Problem List    Diagnosis Date Noted    Acne rosacea 10/05/2022     Priority: Medium    Rash 10/05/2022     Priority: Medium    Exposure to influenza 08/29/2019    Status post dilatation of esophageal stricture 04/22/2019    Primary insomnia 07/17/2018    Osteopenia of multiple sites 07/17/2018     Overview Note:     3/2018  * lumbar spine average T score is -1.45, she does have T score of -2.2 at L4  * Left hip T score -1.8, neck -1.9,troch -2.1  * Right hip total -1.6, neck -1.9,troch -1.8  * Radius total -1.5  5/2020 Lumbar T score -1.7( L4 -2.4)/ hip bilaterally -1.5/ -1.3   6/2022 - hip neck -.2.2/ L spine -2.0        Orthostatic dizziness 07/17/2018    Generalized anxiety disorder 04/17/2018    Menopause ovarian failure 04/17/2018     Past Medical History:   Diagnosis Date    Allergic rhinitis     Anxiety     Arthritis     Osetopenia    Irritable bowel syndrome     Irritable bowel syndrome (IBS)     Sinus infection       Past Surgical History:   Procedure Laterality Date    BLADDER SURGERY  2013    vaginal tape    COLONOSCOPY  10/26/2015    Dr Harry Todd AP, active colitis    COLONOSCOPY N/A 02/09/2021    Dr Yasir Davalos, Int Hemorrhoids Grade 1, 5 year recall    HYSTERECTOMY (CERVIX STATUS UNKNOWN)      one ovary left    OVARY REMOVAL Right     SINUS SURGERY      TONSILLECTOMY      UPPER GASTROINTESTINAL ENDOSCOPY  10/16/2015    Dr Lola Dillard-w/balloon dil-18mm- Hiatal hernia, GEJ stricture, erythema in mid to distal esophagus-    UPPER GASTROINTESTINAL ENDOSCOPY  08/23/2017    Dr Tono An-w/balloon [id number] hernia, normal path    UPPER GASTROINTESTINAL ENDOSCOPY  03/15/2019    Dr Lola Dillard-w/balloon [id number] hernia, GEJ stricture, reflux    UPPER GASTROINTESTINAL ENDOSCOPY N/A 01/27/2020    Dr Gian Quintanilla, neg for GERD    UPPER GASTROINTESTINAL ENDOSCOPY N/A 01/27/2020    Dr Gian Quintanilla, neg for GERD    WISDOM TOOTH EXTRACTION       Current Outpatient Medications   Medication Sig Dispense Refill    desvenlafaxine succinate (PRISTIQ) 50 MG TB24 extended release tablet TAKE ONE TABLET BY MOUTH EVERY DAY 30 tablet 5    temazepam (RESTORIL) 30 MG capsule Take 1 capsule by mouth nightly as needed for anxiety or sleep. 30 capsule 2    hyoscyamine (LEVSIN/SL) 125 MCG sublingual tablet TAKE 2 TABLETS BY MOUTH 3 TIMES DAILY AS NEEDED (BLOATING) 60 tablet 2    azelastine (ASTELIN) 0.1 % nasal spray 1 spray by Nasal route 2 times daily Use in each nostril as directed      Cholecalciferol (VITAMIN D) 2000 units CAPS capsule Take by mouth      topiramate (TOPAMAX) 25 MG tablet Take 25 mg by mouth daily       estradiol (ESTRACE) 1 MG tablet Take 1 mg by mouth daily       polyethylene glycol (GLYCOLAX) powder Take 1 g by mouth as needed       No current facility-administered medications for this visit. Allergies   Allergen Reactions    Iohexol Rash     This is IV Contrast    Sulfa Antibiotics Rash     Social History     Tobacco Use    Smoking status: Never    Smokeless tobacco: Never   Substance Use Topics    Alcohol use: Yes     Comment: occ      Family History   Problem Relation Age of Onset    Cancer Father         malt    Parkinsonism Father     Stomach Cancer Father         maltoma    Cancer Mother         lung    Lung Cancer Mother     Colon Cancer Neg Hx     Colon Polyps Neg Hx        Review of Systems   Constitutional:  Positive for fatigue. Negative for chills and fever. HENT:  Negative for congestion, ear pain, nosebleeds, postnasal drip and sore throat. Respiratory:  Negative for cough, chest tightness and wheezing.     Cardiovascular:  Negative for chest pain, palpitations and leg swelling. Gastrointestinal:  Negative for abdominal pain and constipation. Genitourinary:  Negative for dysuria and urgency. Musculoskeletal: Negative. Negative for arthralgias. Skin:  Negative for rash. Neurological:  Negative for dizziness and headaches. Psychiatric/Behavioral:  Positive for sleep disturbance. The patient is nervous/anxious. Vitals:    12/06/22 1103   BP: 128/80   Site: Left Upper Arm   Position: Sitting   Cuff Size: Small Adult   Pulse: 86   Resp: 18   SpO2: 97%   Weight: 136 lb (61.7 kg)   Height: 5' 6\" (1.676 m)     Body mass index is 21.95 kg/m². Physical Exam  Constitutional:       Appearance: She is well-developed. HENT:      Right Ear: External ear normal.      Left Ear: External ear normal.      Mouth/Throat:      Pharynx: No oropharyngeal exudate. Eyes:      Conjunctiva/sclera: Conjunctivae normal.      Pupils: Pupils are equal, round, and reactive to light. Neck:      Thyroid: No thyromegaly. Vascular: No JVD. Cardiovascular:      Rate and Rhythm: Normal rate. Heart sounds: Normal heart sounds. No murmur heard. Pulmonary:      Effort: No respiratory distress. Breath sounds: Normal breath sounds. No wheezing or rales. Chest:      Chest wall: No tenderness. Abdominal:      General: Bowel sounds are normal.      Palpations: Abdomen is soft. Musculoskeletal:      Cervical back: Neck supple. Lymphadenopathy:      Cervical: No cervical adenopathy. Skin:     Findings: No rash. Lab Review   No visits with results within 2 Month(s) from this visit.    Latest known visit with results is:   Orders Only on 10/06/2022   Component Date Value    MRSA SCREEN RT-PCR 10/05/2022 Not Detected            ASSESSMENT/PLAN:    Generalized anxiety disorder-   refill Pristiq 50 ,   seems to be doing well        Primary insomnia, refill temazepam she takes 30 mg every evening and has been doing this for years  [facility] reviewed  [facility] was reviewed  On exam today and as per discussions with the patient today there is no evidence of adverse events such as cognitive impairment, sedation, constipation or falls related to prescribed medications. There is also no evidence of aberrant behavior like lost prescriptions or early refill requests or multiple prescribers for controlled substances. Patient  was advised NOT to attempt to drive a motor vehichle or operate any heavy machinery within 6 hrs of taking the presribed medication - temazepam      Rash now resolved  Seen dermatology      No orders of the defined types were placed in this encounter. New Prescriptions    No medications on file         No follow-ups on file. There are no Patient Instructions on file for this visit. EMR Dragon/transcription disclaimer:Significant part of this  encounter note is electronic transcription/translationof spoken language to printed text. The electronic translation of spoken language may be erroneous, or at times, nonsensical words or phrases may be inadvertently transcribed.  Although I have reviewed the note for sucherrors, some may still exist.

## 2023-01-05 ENCOUNTER — TELEMEDICINE (OUTPATIENT)
Dept: INTERNAL MEDICINE | Age: 58
End: 2023-01-05
Payer: COMMERCIAL

## 2023-01-05 DIAGNOSIS — J01.00 ACUTE NON-RECURRENT MAXILLARY SINUSITIS: Primary | ICD-10-CM

## 2023-01-05 DIAGNOSIS — R44.8 FACIAL PRESSURE: ICD-10-CM

## 2023-01-05 DIAGNOSIS — J34.89 SINUS PRESSURE: ICD-10-CM

## 2023-01-05 PROCEDURE — 99213 OFFICE O/P EST LOW 20 MIN: CPT | Performed by: INTERNAL MEDICINE

## 2023-01-05 RX ORDER — PREDNISONE 10 MG/1
10 TABLET ORAL 2 TIMES DAILY
Qty: 6 TABLET | Refills: 0 | Status: SHIPPED | OUTPATIENT
Start: 2023-01-05 | End: 2023-01-08

## 2023-01-05 RX ORDER — LEVOFLOXACIN 500 MG/1
500 TABLET, FILM COATED ORAL DAILY
Qty: 7 TABLET | Refills: 0 | Status: SHIPPED | OUTPATIENT
Start: 2023-01-05 | End: 2023-01-12

## 2023-01-05 ASSESSMENT — ENCOUNTER SYMPTOMS
RHINORRHEA: 1
SINUS PAIN: 1
CONSTIPATION: 0
SINUS PRESSURE: 1
WHEEZING: 0
COUGH: 0
ABDOMINAL PAIN: 0
CHEST TIGHTNESS: 0
SORE THROAT: 0

## 2023-01-05 ASSESSMENT — PATIENT HEALTH QUESTIONNAIRE - PHQ9
SUM OF ALL RESPONSES TO PHQ QUESTIONS 1-9: 0
SUM OF ALL RESPONSES TO PHQ9 QUESTIONS 1 & 2: 0
2. FEELING DOWN, DEPRESSED OR HOPELESS: 0
1. LITTLE INTEREST OR PLEASURE IN DOING THINGS: 0
SUM OF ALL RESPONSES TO PHQ QUESTIONS 1-9: 0

## 2023-01-05 NOTE — PROGRESS NOTES
Chief Complaint   Patient presents with    Sinusitis     Faciial pain mainly the left side, fatigue, jaw pain, blowing out yellow junk in her nose when she does her nasal rinse, otalgia on the left side. ON GOING FOR 3 WEEKS OR LONGER. As not done a covid test, states that she has a long history of sinus issues. Has done nasal sprays and Mucinex D     History of presenting illness:  Luz Bull is a64 y.o. female     TELEHEALTH EVALUATION -- Audio/Visual (During WEWBT-92 public health emergency)  Patient location-home  Pursuant to the emergency declaration under the [facility] [place] [address] waiver authority and the [employer] Act, this Virtual  Visit was conducted, with patient's consent, to reduce the patient's risk of exposure to COVID-19 and provide continuity of care for an established patient. Services were provided through a video synchronous discussion virtually to substitute for in-person clinic visit.     Patient Active Problem List    Diagnosis Date Noted    Acne rosacea 10/05/2022     Priority: Medium    Rash 10/05/2022     Priority: Medium    Exposure to influenza 08/29/2019    Status post dilatation of esophageal stricture 04/22/2019    Primary insomnia 07/17/2018    Osteopenia of multiple sites 07/17/2018     Overview Note:     3/2018  * lumbar spine average T score is -1.45, she does have T score of -2.2 at L4  * Left hip T score -1.8, neck -1.9,troch -2.1  * Right hip total -1.6, neck -1.9,troch -1.8  * Radius total -1.5  5/2020 Lumbar T score -1.7( L4 -2.4)/ hip bilaterally -1.5/ -1.3   6/2022 - hip neck -.2.2/ L spine -2.0        Orthostatic dizziness 07/17/2018    Generalized anxiety disorder 04/17/2018    Menopause ovarian failure 04/17/2018     Past Medical History:   Diagnosis Date    Allergic rhinitis     Anxiety     Arthritis     Osetopenia    Irritable bowel syndrome     Irritable bowel syndrome (IBS) Sinus infection       Past Surgical History:   Procedure Laterality Date    BLADDER SURGERY  2013    vaginal tape    COLONOSCOPY  10/26/2015    Dr Myra John AP, active colitis    COLONOSCOPY N/A 02/09/2021    Dr Anabela Sheets, Int Hemorrhoids Grade 1, 5 year recall    HYSTERECTOMY (CERVIX STATUS UNKNOWN)      one ovary left    OVARY REMOVAL Right     SINUS SURGERY      TONSILLECTOMY      UPPER GASTROINTESTINAL ENDOSCOPY  10/16/2015    Dr Nisa Dillard-w/balloon dil-18mm- Hiatal hernia, GEJ stricture, erythema in mid to distal esophagus-    UPPER GASTROINTESTINAL ENDOSCOPY  08/23/2017    Dr Thi An-w/balloon [id number] hernia, normal path    UPPER GASTROINTESTINAL ENDOSCOPY  03/15/2019    Dr Nisa Dillard-w/balloon [id number] hernia, GEJ stricture, reflux    UPPER GASTROINTESTINAL ENDOSCOPY N/A 01/27/2020    Dr Himanshu Driscoll, neg for GERD    UPPER GASTROINTESTINAL ENDOSCOPY N/A 01/27/2020    Dr Himanshu Driscoll, neg for GERD    WISDOM TOOTH EXTRACTION       Current Outpatient Medications   Medication Sig Dispense Refill    levoFLOXacin (LEVAQUIN) 500 MG tablet Take 1 tablet by mouth daily for 7 days 7 tablet 0    predniSONE (DELTASONE) 10 MG tablet Take 1 tablet by mouth 2 times daily for 3 days 6 tablet 0    desvenlafaxine succinate (PRISTIQ) 50 MG TB24 extended release tablet TAKE ONE TABLET BY MOUTH EVERY DAY 30 tablet 5    temazepam (RESTORIL) 30 MG capsule Take 1 capsule by mouth nightly as needed for anxiety or sleep.  30 capsule 2    hyoscyamine (LEVSIN/SL) 125 MCG sublingual tablet TAKE 2 TABLETS BY MOUTH 3 TIMES DAILY AS NEEDED (BLOATING) 60 tablet 2    azelastine (ASTELIN) 0.1 % nasal spray 1 spray by Nasal route 2 times daily Use in each nostril as directed      Cholecalciferol (VITAMIN D) 2000 units CAPS capsule Take by mouth      topiramate (TOPAMAX) 25 MG tablet Take 25 mg by mouth daily estradiol (ESTRACE) 1 MG tablet Take 1 mg by mouth daily       polyethylene glycol (GLYCOLAX) powder Take 1 g by mouth as needed       No current facility-administered medications for this visit. Allergies   Allergen Reactions    Iohexol Rash     This is IV Contrast    Sulfa Antibiotics Rash     Social History     Tobacco Use    Smoking status: Never    Smokeless tobacco: Never   Substance Use Topics    Alcohol use: Yes     Comment: occ      Family History   Problem Relation Age of Onset    Cancer Father         malt    Parkinsonism Father     Stomach Cancer Father         maltoma    Cancer Mother         lung    Lung Cancer Mother     Colon Cancer Neg Hx     Colon Polyps Neg Hx        Review of Systems   Constitutional:  Positive for fatigue. Negative for chills and fever. HENT:  Positive for postnasal drip, rhinorrhea, sinus pressure and sinus pain. Negative for congestion, ear pain, nosebleeds and sore throat. Respiratory:  Negative for cough, chest tightness and wheezing. Cardiovascular:  Negative for chest pain, palpitations and leg swelling. Gastrointestinal:  Negative for abdominal pain and constipation. Genitourinary:  Negative for dysuria and urgency. Musculoskeletal: Negative. Negative for arthralgias. Skin:  Negative for rash. Neurological:  Negative for dizziness and headaches. Psychiatric/Behavioral: Negative. There were no vitals filed for this visit. There is no height or weight on file to calculate BMI.   Patient-Reported Vitals 1/5/2023   Patient-Reported Systolic 469   Patient-Reported Diastolic 96   Patient-Reported Pulse 68   Patient-Reported Temperature 97.6        Physical Exam  PHYSICAL EXAMINATION:  [ INSTRUCTIONS:  \"[x]\" Indicates a positive item  \"[]\" Indicates a negative item  -- DELETE ALL ITEMS NOT EXAMINED]  [x] Alert  [x] Oriented to person/place/time    [x] No apparent distress  [] Toxic appearing    [] Face flushed appearing [] Sclera clear  [] Lips are cyanotic      [x] Breathing appears normal  [] Appears tachypneic      [] Rash on visible skin    [x] Cranial Nerves II-XII grossly intact    [x] Motor grossly intact in visible upper extremities    [x] Motor grossly intact in visible lower extremities    [x] Normal Mood  [] Anxious appearing    [] Depressed appearing  [] Confused appearing      [] Poor short term memory  [] Poor long term memory    [] OTHER:      Due to this being a TeleHealth encounter, evaluation of the following organ systems is limited: Vitals/Constitutional/EENT/Resp/CV/GI//MS/Neuro/Skin/Heme-Lymph-Imm. Lab Review   No visits with results within 2 Month(s) from this visit. Latest known visit with results is:   Orders Only on 10/06/2022   Component Date Value    MRSA SCREEN RT-PCR 10/05/2022 Not Detected            ASSESSMENT/PLAN:      Acute non-recurrent maxillary sinusitis    Sinus pressure    Facial pressure    Other orders  -     levoFLOXacin (LEVAQUIN) 500 MG tablet; Take 1 tablet by mouth daily for 7 days  -     predniSONE (DELTASONE) 10 MG tablet; Take 1 tablet by mouth 2 times daily for 3 days    Additional recommendations  Use of Flonase no spray  Increase fluids  Warm liquids recommended            No orders of the defined types were placed in this encounter. New Prescriptions    LEVOFLOXACIN (LEVAQUIN) 500 MG TABLET    Take 1 tablet by mouth daily for 7 days    PREDNISONE (DELTASONE) 10 MG TABLET    Take 1 tablet by mouth 2 times daily for 3 days         No follow-ups on file. There are no Patient Instructions on file for this visit. EMR Dragon/transcription disclaimer:Significant part of this  encounter note is electronic transcription/translationof spoken language to printed text. The electronic translation of spoken language may be erroneous, or at times, nonsensical words or phrases may be inadvertently transcribed.  Although I have reviewed the note for sucherrors, some may still exist.

## 2023-02-03 ENCOUNTER — HOSPITAL ENCOUNTER (OUTPATIENT)
Dept: GENERAL RADIOLOGY | Age: 58
Discharge: HOME | End: 2023-02-03
Payer: COMMERCIAL

## 2023-02-03 ENCOUNTER — OFFICE VISIT (OUTPATIENT)
Age: 58
End: 2023-02-03
Payer: COMMERCIAL

## 2023-02-03 VITALS
DIASTOLIC BLOOD PRESSURE: 82 MMHG | WEIGHT: 137 LBS | TEMPERATURE: 97.9 F | OXYGEN SATURATION: 100 % | HEIGHT: 66 IN | HEART RATE: 79 BPM | RESPIRATION RATE: 18 BRPM | BODY MASS INDEX: 22.02 KG/M2 | SYSTOLIC BLOOD PRESSURE: 122 MMHG

## 2023-02-03 DIAGNOSIS — S29.9XXA RIB INJURY: ICD-10-CM

## 2023-02-03 DIAGNOSIS — M94.0 COSTOCHONDRITIS: Primary | ICD-10-CM

## 2023-02-03 PROCEDURE — 99213 OFFICE O/P EST LOW 20 MIN: CPT

## 2023-02-03 PROCEDURE — 71100 X-RAY EXAM RIBS UNI 2 VIEWS: CPT | Performed by: RADIOLOGY

## 2023-02-03 PROCEDURE — 71100 X-RAY EXAM RIBS UNI 2 VIEWS: CPT

## 2023-02-03 ASSESSMENT — ENCOUNTER SYMPTOMS
COUGH: 0
SHORTNESS OF BREATH: 0

## 2023-02-03 NOTE — PATIENT INSTRUCTIONS
Brace area with a pillow when changing positions or coughing  Consciously take 10 deep breaths every 15 minutes  Xray results will be called once available  May ice for first 3 days and then alternate heat and ice.    Aleve per label instructions to decrease pain and inflammation

## 2023-02-03 NOTE — PROGRESS NOTES
[address]  [address] [facility] [address] [id number]  Dept: [phone]  Dept Fax: [phone]  Loc: [phone]    Italo Osman is a 62 y.o. female who presents today for her medical conditions/complaints as noted below. Italo Osman is c/o of Other (Injury to right side)        HPI:     HPI  Italo Osman presents with complaints of injury to right ribs. Symptoms began Monday. She was cleaning her bathtub and leaned over the shower track. She leaned harder to scrub an area and felt her ribs pop. OTC treatment includes ice, rest and ibuprofen. Reports recent antibiotics and steroids for sinusitis in jan. Most recent covid19 infection oct 2022. Vaccinated against RPREA53.      Past Medical History:   Diagnosis Date    Allergic rhinitis     Anxiety     Arthritis     Osetopenia    Irritable bowel syndrome     Irritable bowel syndrome (IBS)     Sinus infection      Past Surgical History:   Procedure Laterality Date    BLADDER SURGERY  2013    vaginal tape    COLONOSCOPY  10/26/2015    Dr Edilma Del Rio AP, active colitis    COLONOSCOPY N/A 02/09/2021    Dr Bon Bowen, Int Hemorrhoids Grade 1, 5 year recall    HYSTERECTOMY (CERVIX STATUS UNKNOWN)      one ovary left    OVARY REMOVAL Right     SINUS SURGERY      TONSILLECTOMY      UPPER GASTROINTESTINAL ENDOSCOPY  10/16/2015    Dr Mirian Dillard-w/balloon dil-18mm- Hiatal hernia, GEJ stricture, erythema in mid to distal esophagus-    UPPER GASTROINTESTINAL ENDOSCOPY  08/23/2017    Dr Tammy An-w/balloon [id number] hernia, normal path    UPPER GASTROINTESTINAL ENDOSCOPY  03/15/2019    Dr Mirian Dillard-w/balloon [id number] hernia, GEJ stricture, reflux    UPPER GASTROINTESTINAL ENDOSCOPY N/A 01/27/2020    Dr Dipti Govea, neg for GERD    UPPER GASTROINTESTINAL ENDOSCOPY N/A 01/27/2020    Dr Dipti Govea, neg for GERD    WISDOM TOOTH EXTRACTION         Family History   Problem Relation Age of Onset    Cancer Father         malt    Parkinsonism Father     Stomach Cancer Father         maltoma    Cancer Mother         lung    Lung Cancer Mother     Colon Cancer Neg Hx     Colon Polyps Neg Hx        Social History     Tobacco Use    Smoking status: Never    Smokeless tobacco: Never   Substance Use Topics    Alcohol use: Yes     Comment: occ      Current Outpatient Medications   Medication Sig Dispense Refill    desvenlafaxine succinate (PRISTIQ) 50 MG TB24 extended release tablet TAKE ONE TABLET BY MOUTH EVERY DAY 30 tablet 5    temazepam (RESTORIL) 30 MG capsule Take 1 capsule by mouth nightly as needed for anxiety or sleep. 30 capsule 2    hyoscyamine (LEVSIN/SL) 125 MCG sublingual tablet TAKE 2 TABLETS BY MOUTH 3 TIMES DAILY AS NEEDED (BLOATING) 60 tablet 2    azelastine (ASTELIN) 0.1 % nasal spray 1 spray by Nasal route 2 times daily Use in each nostril as directed      Cholecalciferol (VITAMIN D) 2000 units CAPS capsule Take by mouth      topiramate (TOPAMAX) 25 MG tablet Take 25 mg by mouth daily       estradiol (ESTRACE) 1 MG tablet Take 1 mg by mouth daily       polyethylene glycol (GLYCOLAX) powder Take 1 g by mouth as needed       No current facility-administered medications for this visit.      Allergies   Allergen Reactions    Iohexol Rash     This is IV Contrast    Iodinated Contrast Media Rash    Sulfa Antibiotics Rash       Health Maintenance   Topic Date Due    COVID-19 Vaccine (3 - Booster for Moderna series) 04/14/2021    Depression Screen  01/05/2024    Breast cancer screen  06/09/2024    Colorectal Cancer Screen  02/09/2026    Lipids  06/03/2027    DTaP/Tdap/Td vaccine (2 - Td or Tdap) 11/15/2031    Flu vaccine  Completed    Shingles vaccine  Completed    Hepatitis C screen  Completed    HIV screen  Completed    Hepatitis A vaccine  Aged Out    Hib vaccine  Aged Out    Meningococcal (ACWY) vaccine  Aged Out    Pneumococcal 0-64 years Vaccine  Aged Out       Subjective:     Review of Systems   Constitutional:  Negative for fever. Respiratory:  Negative for cough and shortness of breath. Cardiovascular:  Positive for chest pain (pain with palpation and deep breaths right anterior ribs).     :Objective      Physical Exam  Constitutional:       General: She is not in acute distress. Appearance: Normal appearance. She is not toxic-appearing. HENT:      Head: Normocephalic and atraumatic. Right Ear: External ear normal.      Left Ear: External ear normal.      Nose: Nose normal.      Mouth/Throat:      Mouth: Mucous membranes are moist.   Eyes:      General:         Right eye: No discharge. Left eye: No discharge. Conjunctiva/sclera: Conjunctivae normal.   Cardiovascular:      Rate and Rhythm: Normal rate and regular rhythm. Pulmonary:      Effort: Pulmonary effort is normal. No respiratory distress. Breath sounds: Normal breath sounds. Chest:       Abdominal:      General: Abdomen is flat. Palpations: Abdomen is soft. Musculoskeletal:         General: Normal range of motion. Cervical back: Normal range of motion. Lymphadenopathy:      Cervical: No cervical adenopathy. Skin:     General: Skin is warm and dry. Capillary Refill: Capillary refill takes less than 2 seconds. Findings: No rash. Neurological:      General: No focal deficit present. Mental Status: She is alert. Psychiatric:         Mood and Affect: Mood normal.     /82   Pulse 79   Temp 97.9 °F (36.6 °C)   Resp 18   Ht 5' 6\" (1.676 m)   Wt 137 lb (62.1 kg)   SpO2 100%   BMI 22.11 kg/m²     :Assessment       Diagnosis Orders   1. Costochondritis        2. Rib injury  XR RIBS RIGHT (2 VIEWS)          :Plan   Suspect costochondritis. Xray to r/o fracture or displacement. Discussed nsaids. Encouraged supportive care at home. Return precautions and home care education completed. Patient verbalized understanding.     Orders Placed This Encounter   Procedures    XR RIBS RIGHT (2 VIEWS)     Standing Status:   Future     Number of Occurrences:   1     Standing Expiration Date:   2/3/2024     Order Specific Question:   Reason for exam:     Answer:   rib injury anteriorally, pain radiates around posterior ribs     No results found for this visit on 02/03/23.    No follow-ups on file.    No orders of the defined types were placed in this encounter.      Patient given educational materials- see patient instructions.  Discussed use, benefit, and side effects of prescribed medications.  All patient questions answered.  Pt voiced understanding.     Patient Instructions   Brace area with a pillow when changing positions or coughing  Consciously take 10 deep breaths every 15 minutes  Xray results will be called once available  May ice for first 3 days and then alternate heat and ice.   Aleve per label instructions to decrease pain and inflammation      Electronically signed by GRACIE Rosario CNP on 2/3/2023 at 8:21 AM

## 2023-02-20 ENCOUNTER — OFFICE VISIT (OUTPATIENT)
Dept: INTERNAL MEDICINE | Age: 58
End: 2023-02-20
Payer: COMMERCIAL

## 2023-02-20 VITALS
BODY MASS INDEX: 22.02 KG/M2 | OXYGEN SATURATION: 97 % | DIASTOLIC BLOOD PRESSURE: 80 MMHG | RESPIRATION RATE: 18 BRPM | HEIGHT: 66 IN | WEIGHT: 137 LBS | SYSTOLIC BLOOD PRESSURE: 128 MMHG | HEART RATE: 76 BPM

## 2023-02-20 DIAGNOSIS — R07.89 CHEST WALL PAIN: ICD-10-CM

## 2023-02-20 DIAGNOSIS — S29.011D MUSCLE STRAIN OF CHEST WALL, SUBSEQUENT ENCOUNTER: Primary | ICD-10-CM

## 2023-02-20 DIAGNOSIS — R07.81 PAINFUL RIB: ICD-10-CM

## 2023-02-20 PROCEDURE — 99213 OFFICE O/P EST LOW 20 MIN: CPT | Performed by: INTERNAL MEDICINE

## 2023-02-20 RX ORDER — MELOXICAM 15 MG/1
15 TABLET ORAL DAILY
Qty: 20 TABLET | Refills: 0 | Status: SHIPPED | OUTPATIENT
Start: 2023-02-20

## 2023-02-20 RX ORDER — METHOCARBAMOL 500 MG/1
500 TABLET, FILM COATED ORAL 2 TIMES DAILY PRN
Qty: 20 TABLET | Refills: 0 | Status: SHIPPED | OUTPATIENT
Start: 2023-02-20 | End: 2023-03-02

## 2023-02-20 ASSESSMENT — ENCOUNTER SYMPTOMS
CONSTIPATION: 0
ABDOMINAL PAIN: 0
COUGH: 0
WHEEZING: 0
SORE THROAT: 0
CHEST TIGHTNESS: 0

## 2023-02-20 NOTE — PROGRESS NOTES
Chief Complaint   Patient presents with    Rib Pain (injury)     Pt states that 3 weeks ago she was cleaning a bath tub and leaning over the side of the railing and felt her rib pop, went to urgent care, was seen there, no fracture was told if not better in 3 weeks to see her PCP. History of presenting illness:  Christa Prader is a64 y.o. female who presents today for follow up on her chronic medical conditions as noted below.     Patient Active Problem List    Diagnosis Date Noted    Acne rosacea 10/05/2022     Priority: Medium    Rash 10/05/2022     Priority: Medium    Exposure to influenza 08/29/2019    Status post dilatation of esophageal stricture 04/22/2019    Primary insomnia 07/17/2018    Osteopenia of multiple sites 07/17/2018     Overview Note:     3/2018  * lumbar spine average T score is -1.45, she does have T score of -2.2 at L4  * Left hip T score -1.8, neck -1.9,troch -2.1  * Right hip total -1.6, neck -1.9,troch -1.8  * Radius total -1.5  5/2020 Lumbar T score -1.7( L4 -2.4)/ hip bilaterally -1.5/ -1.3   6/2022 - hip neck -.2.2/ L spine -2.0        Orthostatic dizziness 07/17/2018    Generalized anxiety disorder 04/17/2018    Menopause ovarian failure 04/17/2018     Past Medical History:   Diagnosis Date    Allergic rhinitis     Anxiety     Arthritis     Osetopenia    Irritable bowel syndrome     Irritable bowel syndrome (IBS)     Sinus infection       Past Surgical History:   Procedure Laterality Date    BLADDER SURGERY  2013    vaginal tape    COLONOSCOPY  10/26/2015    Dr Xiang Ernandez AP, active colitis    COLONOSCOPY N/A 02/09/2021    Dr Nelia Andrade, Int Hemorrhoids Grade 1, 5 year recall    HYSTERECTOMY (CERVIX STATUS UNKNOWN)      one ovary left    OVARY REMOVAL Right     SINUS SURGERY      TONSILLECTOMY      UPPER GASTROINTESTINAL ENDOSCOPY  10/16/2015    Dr Davy Dillard-w/balloon dil-18mm- Hiatal hernia, GEJ stricture, erythema in mid to distal esophagus-    UPPER GASTROINTESTINAL ENDOSCOPY  08/23/2017    Dr Tammy An-w/balloon [id number] hernia, normal path    UPPER GASTROINTESTINAL ENDOSCOPY  03/15/2019    Dr Mirian Dillard-w/balloon [id number] hernia, GEJ stricture, reflux    UPPER GASTROINTESTINAL ENDOSCOPY N/A 01/27/2020    Dr Dipti Govea, neg for GERD    UPPER GASTROINTESTINAL ENDOSCOPY N/A 01/27/2020    Dr Dipti Govea, neg for GERD    WISDOM TOOTH EXTRACTION       Current Outpatient Medications   Medication Sig Dispense Refill    meloxicam (MOBIC) 15 MG tablet Take 1 tablet by mouth daily 20 tablet 0    methocarbamol (ROBAXIN) 500 MG tablet Take 1 tablet by mouth 2 times daily as needed (as needed) 20 tablet 0    desvenlafaxine succinate (PRISTIQ) 50 MG TB24 extended release tablet TAKE ONE TABLET BY MOUTH EVERY DAY 30 tablet 5    temazepam (RESTORIL) 30 MG capsule Take 1 capsule by mouth nightly as needed for anxiety or sleep. 30 capsule 2    hyoscyamine (LEVSIN/SL) 125 MCG sublingual tablet TAKE 2 TABLETS BY MOUTH 3 TIMES DAILY AS NEEDED (BLOATING) 60 tablet 2    azelastine (ASTELIN) 0.1 % nasal spray 1 spray by Nasal route 2 times daily Use in each nostril as directed      Cholecalciferol (VITAMIN D) 2000 units CAPS capsule Take by mouth      topiramate (TOPAMAX) 25 MG tablet Take 25 mg by mouth daily       estradiol (ESTRACE) 1 MG tablet Take 1 mg by mouth daily       polyethylene glycol (GLYCOLAX) powder Take 1 g by mouth as needed       No current facility-administered medications for this visit.      Allergies   Allergen Reactions    Iohexol Rash     This is IV Contrast    Iodinated Contrast Media Rash    Sulfa Antibiotics Rash     Social History     Tobacco Use    Smoking status: Never    Smokeless tobacco: Never   Substance Use Topics    Alcohol use: Yes     Comment: occ      Family History   Problem Relation Age of Onset    Cancer Father         malt    Parkinsonism Father Stomach Cancer Father         maltoma    Cancer Mother         lung    Lung Cancer Mother     Colon Cancer Neg Hx     Colon Polyps Neg Hx        Review of Systems   Constitutional:  Negative for chills, fatigue and fever. HENT:  Negative for congestion, ear pain, nosebleeds, postnasal drip and sore throat. Respiratory:  Negative for cough, chest tightness and wheezing. Cardiovascular:  Positive for chest pain. Negative for palpitations and leg swelling. Gastrointestinal:  Negative for abdominal pain and constipation. Genitourinary:  Negative for dysuria and urgency. Musculoskeletal: Negative. Negative for arthralgias. Skin:  Negative for rash. Neurological:  Negative for dizziness and headaches. Psychiatric/Behavioral: Negative. Vitals:    02/20/23 1328   BP: 128/80   Site: Left Upper Arm   Position: Sitting   Cuff Size: Large Adult   Pulse: 76   Resp: 18   SpO2: 97%   Weight: 137 lb (62.1 kg)   Height: 5' 6\" (1.676 m)     Body mass index is 22.11 kg/m². Physical Exam  Constitutional:       Appearance: She is well-developed. HENT:      Right Ear: External ear normal.      Left Ear: External ear normal.      Mouth/Throat:      Pharynx: No oropharyngeal exudate. Eyes:      Conjunctiva/sclera: Conjunctivae normal.      Pupils: Pupils are equal, round, and reactive to light. Neck:      Thyroid: No thyromegaly. Vascular: No JVD. Cardiovascular:      Rate and Rhythm: Normal rate. Heart sounds: Normal heart sounds. No murmur heard. Pulmonary:      Effort: No respiratory distress. Breath sounds: Normal breath sounds. No wheezing or rales. Chest:      Chest wall: No tenderness. Abdominal:      General: Bowel sounds are normal.      Palpations: Abdomen is soft. Musculoskeletal:      Cervical back: Neck supple. Comments: Pain on palpation over RT chest wall around ant axill ln ribs 6-8 region   Lymphadenopathy:      Cervical: No cervical adenopathy.    Skin: Findings: No rash. Neurological:      Mental Status: She is oriented to person, place, and time. Lab Review   No visits with results within 2 Month(s) from this visit. Latest known visit with results is:   Orders Only on 10/06/2022   Component Date Value    MRSA SCREEN RT-PCR 10/05/2022 Not Detected            ASSESSMENT/PLAN:  Muscle strain of chest wall    Chest wall pain    Painful rib      Symptoms ongoing for about 20 days  Patient was cleaning her bathtub and leaned over the shower track describe an area and felt her ribs pop  Seen at urgent care on 2/3/2023  X-ray was done    Narrative   NO PRIOR REPORT AVAILABLE   Exam: X-RAYS OF THE RIGHT RIBS   Clinical data:Rib injury. Technique: Two views of the right ribs. Prior studies: No prior studies submitted. Findings: There is no fracture, dislocation, lytic or blastic lesion. The joint spaces are preserved. There are no subchondral erosions identified. There is no significant soft tissue calcification and there is no radiopaque foreign body. Impression   Impression:   Normal examination. Note: Direct correlation with point tenderness and the X-ray images is recommended and does significantly increase the sensitivity of radiographic evaluation. Dictated and Electronically Signed by Mirtha Wilkes MD at 03-Feb-2023 09:54:02 AM EST              Recommend  Avoid any activities/ exercises that increase pain  Sleep head elevated  RX robaxin every 12 hr prn  RX meloxicam 15 daily w food x 2 weeks    If sx not improving and resolving , if sx continue or re-occur pt has been instructed to call us and / or return here for follow- up evaluation      No orders of the defined types were placed in this encounter. New Prescriptions    MELOXICAM (MOBIC) 15 MG TABLET    Take 1 tablet by mouth daily    METHOCARBAMOL (ROBAXIN) 500 MG TABLET    Take 1 tablet by mouth 2 times daily as needed (as needed)         No follow-ups on file.    There are no Patient Instructions on file for this visit. EMR Dragon/transcription disclaimer:Significant part of this  encounter note is electronic transcription/translationof spoken language to printed text. The electronic translation of spoken language may be erroneous, or at times, nonsensical words or phrases may be inadvertently transcribed.  Although I have reviewed the note for sucherrors, some may still exist.

## 2023-03-06 SDOH — ECONOMIC STABILITY: HOUSING INSECURITY
IN THE LAST 12 MONTHS, WAS THERE A TIME WHEN YOU DID NOT HAVE A STEADY PLACE TO SLEEP OR SLEPT IN A SHELTER (INCLUDING NOW)?: NO

## 2023-03-06 SDOH — ECONOMIC STABILITY: INCOME INSECURITY: HOW HARD IS IT FOR YOU TO PAY FOR THE VERY BASICS LIKE FOOD, HOUSING, MEDICAL CARE, AND HEATING?: NOT HARD AT ALL

## 2023-03-06 SDOH — ECONOMIC STABILITY: FOOD INSECURITY: WITHIN THE PAST 12 MONTHS, YOU WORRIED THAT YOUR FOOD WOULD RUN OUT BEFORE YOU GOT MONEY TO BUY MORE.: NEVER TRUE

## 2023-03-06 SDOH — ECONOMIC STABILITY: FOOD INSECURITY: WITHIN THE PAST 12 MONTHS, THE FOOD YOU BOUGHT JUST DIDN'T LAST AND YOU DIDN'T HAVE MONEY TO GET MORE.: NEVER TRUE

## 2023-03-06 SDOH — ECONOMIC STABILITY: TRANSPORTATION INSECURITY
IN THE PAST 12 MONTHS, HAS LACK OF TRANSPORTATION KEPT YOU FROM MEETINGS, WORK, OR FROM GETTING THINGS NEEDED FOR DAILY LIVING?: NO

## 2023-03-07 ENCOUNTER — OFFICE VISIT (OUTPATIENT)
Dept: INTERNAL MEDICINE | Age: 58
End: 2023-03-07
Payer: COMMERCIAL

## 2023-03-07 VITALS
HEART RATE: 100 BPM | BODY MASS INDEX: 22.02 KG/M2 | OXYGEN SATURATION: 98 % | SYSTOLIC BLOOD PRESSURE: 102 MMHG | HEIGHT: 66 IN | DIASTOLIC BLOOD PRESSURE: 70 MMHG | WEIGHT: 137 LBS

## 2023-03-07 DIAGNOSIS — F41.1 GENERALIZED ANXIETY DISORDER: Primary | ICD-10-CM

## 2023-03-07 DIAGNOSIS — F51.01 PRIMARY INSOMNIA: ICD-10-CM

## 2023-03-07 PROCEDURE — 99213 OFFICE O/P EST LOW 20 MIN: CPT | Performed by: INTERNAL MEDICINE

## 2023-03-07 RX ORDER — TEMAZEPAM 30 MG/1
CAPSULE ORAL
Qty: 30 CAPSULE | Refills: 2 | Status: SHIPPED | OUTPATIENT
Start: 2023-03-07 | End: 2023-06-26

## 2023-03-07 ASSESSMENT — ENCOUNTER SYMPTOMS
CONSTIPATION: 0
COUGH: 0
ABDOMINAL PAIN: 0
WHEEZING: 0
CHEST TIGHTNESS: 0
SORE THROAT: 0

## 2023-03-07 NOTE — PROGRESS NOTES
Chief Complaint   Patient presents with    3 Month Follow-Up     History of presenting illness:  Jennifer Rubin is a64 y.o. female who presents today for follow up on her chronic medical conditions as noted below.       Patient Active Problem List    Diagnosis Date Noted    Acne rosacea 10/05/2022     Priority: Medium    Rash 10/05/2022     Priority: Medium    Exposure to influenza 08/29/2019    Status post dilatation of esophageal stricture 04/22/2019    Primary insomnia 07/17/2018    Osteopenia of multiple sites 07/17/2018     Overview Note:     3/2018  * lumbar spine average T score is -1.45, she does have T score of -2.2 at L4  * Left hip T score -1.8, neck -1.9,troch -2.1  * Right hip total -1.6, neck -1.9,troch -1.8  * Radius total -1.5  5/2020 Lumbar T score -1.7( L4 -2.4)/ hip bilaterally -1.5/ -1.3   6/2022 - hip neck -.2.2/ L spine -2.0        Orthostatic dizziness 07/17/2018    Generalized anxiety disorder 04/17/2018    Menopause ovarian failure 04/17/2018     Past Medical History:   Diagnosis Date    Allergic rhinitis     Anxiety     Arthritis     Osetopenia    Irritable bowel syndrome     Irritable bowel syndrome (IBS)     Sinus infection       Past Surgical History:   Procedure Laterality Date    BLADDER SURGERY  2013    vaginal tape    COLONOSCOPY  10/26/2015    Dr Franc Baires AP, active colitis    COLONOSCOPY N/A 02/09/2021    Dr Chaparrita García, Int Hemorrhoids Grade 1, 5 year recall    HYSTERECTOMY (CERVIX STATUS UNKNOWN)      one ovary left    OVARY REMOVAL Right     SINUS SURGERY      TONSILLECTOMY      UPPER GASTROINTESTINAL ENDOSCOPY  10/16/2015    Dr Giacomo Dillard-w/balloon dil-18mm- Hiatal hernia, GEJ stricture, erythema in mid to distal esophagus-    UPPER GASTROINTESTINAL ENDOSCOPY  08/23/2017    Dr Jovanny An-w/balloon [id number] hernia, normal path    UPPER GASTROINTESTINAL ENDOSCOPY  03/15/2019    Dr Giacomo Dillard-w/balloon [id number] hernia, GEJ stricture, reflux    UPPER GASTROINTESTINAL ENDOSCOPY N/A 01/27/2020    Dr Cameron Paige, neg for GERD    UPPER GASTROINTESTINAL ENDOSCOPY N/A 01/27/2020    Dr Cameron Paige, neg for GERD    WISDOM TOOTH EXTRACTION       Current Outpatient Medications   Medication Sig Dispense Refill    temazepam (RESTORIL) 30 MG capsule Take 1 capsule by mouth nightly as needed for anxiety or sleep. 30 capsule 2    meloxicam (MOBIC) 15 MG tablet Take 1 tablet by mouth daily 20 tablet 0    desvenlafaxine succinate (PRISTIQ) 50 MG TB24 extended release tablet TAKE ONE TABLET BY MOUTH EVERY DAY 30 tablet 5    temazepam (RESTORIL) 30 MG capsule Take 1 capsule by mouth nightly as needed for anxiety or sleep. 30 capsule 2    hyoscyamine (LEVSIN/SL) 125 MCG sublingual tablet TAKE 2 TABLETS BY MOUTH 3 TIMES DAILY AS NEEDED (BLOATING) 60 tablet 2    azelastine (ASTELIN) 0.1 % nasal spray 1 spray by Nasal route 2 times daily Use in each nostril as directed      Cholecalciferol (VITAMIN D) 2000 units CAPS capsule Take by mouth      topiramate (TOPAMAX) 25 MG tablet Take 25 mg by mouth daily       estradiol (ESTRACE) 1 MG tablet Take 1 mg by mouth daily       polyethylene glycol (GLYCOLAX) powder Take 1 g by mouth as needed       No current facility-administered medications for this visit.      Allergies   Allergen Reactions    Iohexol Rash     This is IV Contrast    Iodinated Contrast Media Rash    Sulfa Antibiotics Rash     Social History     Tobacco Use    Smoking status: Never    Smokeless tobacco: Never   Substance Use Topics    Alcohol use: Yes     Comment: occ      Family History   Problem Relation Age of Onset    Cancer Father         malt    Parkinsonism Father     Stomach Cancer Father         maltoma    Cancer Mother         lung    Lung Cancer Mother     Colon Cancer Neg Hx     Colon Polyps Neg Hx        Review of Systems   Constitutional:  Negative for chills, fatigue and fever. HENT:  Negative for congestion, ear pain, nosebleeds, postnasal drip and sore throat. Respiratory:  Negative for cough, chest tightness and wheezing. Cardiovascular:  Negative for chest pain, palpitations and leg swelling. Gastrointestinal:  Negative for abdominal pain and constipation. Genitourinary:  Negative for dysuria and urgency. Musculoskeletal: Negative. Negative for arthralgias. Skin:  Negative for rash. Neurological:  Negative for dizziness and headaches. Psychiatric/Behavioral: Negative. Vitals:    03/07/23 1048   BP: 102/70   Pulse: 100   SpO2: 98%   Weight: 137 lb (62.1 kg)   Height: 5' 6\" (1.676 m)     Body mass index is 22.11 kg/m². Physical Exam  Constitutional:       Appearance: She is well-developed. HENT:      Right Ear: External ear normal.      Left Ear: External ear normal.      Mouth/Throat:      Pharynx: No oropharyngeal exudate. Eyes:      Conjunctiva/sclera: Conjunctivae normal.      Pupils: Pupils are equal, round, and reactive to light. Neck:      Thyroid: No thyromegaly. Vascular: No JVD. Cardiovascular:      Rate and Rhythm: Normal rate. Heart sounds: Normal heart sounds. No murmur heard. Pulmonary:      Effort: No respiratory distress. Breath sounds: Normal breath sounds. No wheezing or rales. Chest:      Chest wall: No tenderness. Abdominal:      General: Bowel sounds are normal.      Palpations: Abdomen is soft. Musculoskeletal:      Cervical back: Neck supple. Lymphadenopathy:      Cervical: No cervical adenopathy. Skin:     Findings: No rash. Neurological:      Mental Status: She is oriented to person, place, and time. Lab Review   No visits with results within 2 Month(s) from this visit.    Latest known visit with results is:   Orders Only on 10/06/2022   Component Date Value    MRSA SCREEN RT-PCR 10/05/2022 Not Detected            ASSESSMENT/PLAN:    Generalized anxiety disorder-   refill Pristiq 50 , doing well        Primary insomnia, refill temazepam she takes 30 mg every evening and has been doing this for years  Roxane Rodriguez reviewed  Roxane Rodriguez was reviewed  On exam today and as per discussions with the patient today there is no evidence of adverse events such as cognitive impairment, sedation, constipation or falls related to prescribed medications. There is also no evidence of aberrant behavior like lost prescriptions or early refill requests or multiple prescribers for controlled substances. Patient  was advised NOT to attempt to drive a motor vehichle or operate any heavy machinery within 6 hrs of taking the presribed medication - temazepam          No orders of the defined types were placed in this encounter. New Prescriptions    No medications on file         No follow-ups on file. There are no Patient Instructions on file for this visit. EMR Dragon/transcription disclaimer:Significant part of this  encounter note is electronic transcription/translationof spoken language to printed text. The electronic translation of spoken language may be erroneous, or at times, nonsensical words or phrases may be inadvertently transcribed.  Although I have reviewed the note for sucherrors, some may still exist.

## 2023-05-23 ENCOUNTER — OFFICE VISIT (OUTPATIENT)
Dept: INTERNAL MEDICINE | Age: 58
End: 2023-05-23
Payer: COMMERCIAL

## 2023-05-23 VITALS
OXYGEN SATURATION: 99 % | RESPIRATION RATE: 18 BRPM | HEART RATE: 88 BPM | DIASTOLIC BLOOD PRESSURE: 78 MMHG | HEIGHT: 66 IN | WEIGHT: 135 LBS | BODY MASS INDEX: 21.69 KG/M2 | SYSTOLIC BLOOD PRESSURE: 120 MMHG

## 2023-05-23 DIAGNOSIS — R06.2 WHEEZING: ICD-10-CM

## 2023-05-23 DIAGNOSIS — R05.1 ACUTE COUGH: ICD-10-CM

## 2023-05-23 DIAGNOSIS — J34.89 SINUS PRESSURE: ICD-10-CM

## 2023-05-23 DIAGNOSIS — J21.9 ACUTE BRONCHITIS AND BRONCHIOLITIS: Primary | ICD-10-CM

## 2023-05-23 DIAGNOSIS — J20.9 ACUTE BRONCHITIS AND BRONCHIOLITIS: Primary | ICD-10-CM

## 2023-05-23 PROCEDURE — 99213 OFFICE O/P EST LOW 20 MIN: CPT | Performed by: INTERNAL MEDICINE

## 2023-05-23 RX ORDER — ALBUTEROL SULFATE 90 UG/1
2 AEROSOL, METERED RESPIRATORY (INHALATION) 4 TIMES DAILY PRN
Qty: 18 G | Refills: 0 | Status: SHIPPED | OUTPATIENT
Start: 2023-05-23

## 2023-05-23 RX ORDER — CEFDINIR 300 MG/1
300 CAPSULE ORAL 2 TIMES DAILY
Qty: 14 CAPSULE | Refills: 0 | Status: SHIPPED | OUTPATIENT
Start: 2023-05-23 | End: 2023-05-30

## 2023-05-23 RX ORDER — PREDNISONE 10 MG/1
10 TABLET ORAL 2 TIMES DAILY
Qty: 8 TABLET | Refills: 0 | Status: SHIPPED | OUTPATIENT
Start: 2023-05-23 | End: 2023-05-27

## 2023-05-23 ASSESSMENT — ENCOUNTER SYMPTOMS
ABDOMINAL PAIN: 0
SINUS PRESSURE: 1
RHINORRHEA: 1
CHEST TIGHTNESS: 0
WHEEZING: 0
SORE THROAT: 0
SINUS PAIN: 1
CONSTIPATION: 0
COUGH: 1

## 2023-05-23 NOTE — PROGRESS NOTES
normal.      Palpations: Abdomen is soft. Musculoskeletal:      Cervical back: Neck supple. Lymphadenopathy:      Cervical: No cervical adenopathy. Skin:     General: Skin is warm. Findings: No rash. Neurological:      Mental Status: She is oriented to person, place, and time. Lab Review   No visits with results within 2 Month(s) from this visit. Latest known visit with results is:   Orders Only on 10/06/2022   Component Date Value    MRSA SCREEN RT-PCR 10/05/2022 Not Detected            ASSESSMENT/PLAN:    Acute bronchitis and bronchiolitis    Sinus pressure    Acute cough    Wheezing  SX progressively worse 2 weeks      rx  -     cefdinir (OMNICEF) 300 MG capsule; Take 1 capsule by mouth 2 times daily for 7 days  -     albuterol sulfate HFA (VENTOLIN HFA) 108 (90 Base) MCG/ACT inhaler; Inhale 2 puffs into the lungs 4 times daily as needed for Wheezing  -     predniSONE (DELTASONE) 10 MG tablet; Take 1 tablet by mouth 2 times daily for 4 days  Continue Mucinex, Zyrtec, Astelin no spray and Flonase no spray    If sx not improving and resolving , if sx continue or re-occur pt has been instructed to call us and / or return here for follow- up evaluation              No orders of the defined types were placed in this encounter. New Prescriptions    ALBUTEROL SULFATE HFA (VENTOLIN HFA) 108 (90 BASE) MCG/ACT INHALER    Inhale 2 puffs into the lungs 4 times daily as needed for Wheezing    CEFDINIR (OMNICEF) 300 MG CAPSULE    Take 1 capsule by mouth 2 times daily for 7 days    PREDNISONE (DELTASONE) 10 MG TABLET    Take 1 tablet by mouth 2 times daily for 4 days         No follow-ups on file. There are no Patient Instructions on file for this visit. EMR Dragon/transcription disclaimer:Significant part of this  encounter note is electronic transcription/translationof spoken language to printed text.  The electronic translation of spoken language may be erroneous, or at times, nonsensical words or

## 2023-06-07 ENCOUNTER — OFFICE VISIT (OUTPATIENT)
Dept: INTERNAL MEDICINE | Age: 58
End: 2023-06-07
Payer: COMMERCIAL

## 2023-06-07 ENCOUNTER — HOSPITAL ENCOUNTER (OUTPATIENT)
Dept: GENERAL RADIOLOGY | Age: 58
Discharge: HOME | End: 2023-06-07
Payer: COMMERCIAL

## 2023-06-07 VITALS
HEART RATE: 74 BPM | WEIGHT: 137 LBS | BODY MASS INDEX: 22.02 KG/M2 | OXYGEN SATURATION: 98 % | HEIGHT: 66 IN | RESPIRATION RATE: 18 BRPM | SYSTOLIC BLOOD PRESSURE: 110 MMHG | DIASTOLIC BLOOD PRESSURE: 70 MMHG

## 2023-06-07 DIAGNOSIS — Z00.00 ANNUAL PHYSICAL EXAM: ICD-10-CM

## 2023-06-07 DIAGNOSIS — M25.561 CHRONIC PAIN OF RIGHT KNEE: ICD-10-CM

## 2023-06-07 DIAGNOSIS — E55.9 VITAMIN D DEFICIENCY: ICD-10-CM

## 2023-06-07 DIAGNOSIS — F51.01 PRIMARY INSOMNIA: ICD-10-CM

## 2023-06-07 DIAGNOSIS — Z12.31 ENCOUNTER FOR SCREENING MAMMOGRAM FOR BREAST CANCER: ICD-10-CM

## 2023-06-07 DIAGNOSIS — Z79.899 MEDICATION MANAGEMENT: ICD-10-CM

## 2023-06-07 DIAGNOSIS — Z78.0 MENOPAUSE: ICD-10-CM

## 2023-06-07 DIAGNOSIS — Z12.31 ENCOUNTER FOR SCREENING MAMMOGRAM FOR BREAST CANCER: Primary | ICD-10-CM

## 2023-06-07 DIAGNOSIS — R07.89 CHEST PRESSURE: ICD-10-CM

## 2023-06-07 DIAGNOSIS — G89.29 CHRONIC PAIN OF RIGHT KNEE: ICD-10-CM

## 2023-06-07 DIAGNOSIS — M85.89 OSTEOPENIA OF MULTIPLE SITES: ICD-10-CM

## 2023-06-07 LAB
25(OH)D3 SERPL-MCNC: 58.4 NG/ML
ALBUMIN SERPL-MCNC: 4.2 G/DL (ref 3.5–5.2)
ALP SERPL-CCNC: 87 U/L (ref 35–104)
ALT SERPL-CCNC: 28 U/L (ref 5–33)
ANION GAP SERPL CALCULATED.3IONS-SCNC: 12 MMOL/L (ref 7–19)
AST SERPL-CCNC: 28 U/L (ref 5–32)
BACTERIA URNS QL MICRO: NEGATIVE /HPF
BASOPHILS # BLD: 0 K/UL (ref 0–0.2)
BASOPHILS NFR BLD: 0.5 % (ref 0–1)
BILIRUB SERPL-MCNC: 0.5 MG/DL (ref 0.2–1.2)
BILIRUB UR QL STRIP: NEGATIVE
BUN SERPL-MCNC: 16 MG/DL (ref 6–20)
CALCIUM SERPL-MCNC: 9.5 MG/DL (ref 8.6–10)
CHLORIDE SERPL-SCNC: 104 MMOL/L (ref 98–111)
CHOLEST SERPL-MCNC: 202 MG/DL (ref 160–199)
CLARITY UR: CLEAR
CO2 SERPL-SCNC: 25 MMOL/L (ref 22–29)
COLOR UR: YELLOW
CREAT SERPL-MCNC: 0.8 MG/DL (ref 0.5–0.9)
CRYSTALS URNS MICRO: ABNORMAL /HPF
EOSINOPHIL # BLD: 0.1 K/UL (ref 0–0.6)
EOSINOPHIL NFR BLD: 1.6 % (ref 0–5)
EPI CELLS #/AREA URNS AUTO: 2 /HPF (ref 0–5)
ERYTHROCYTE [DISTWIDTH] IN BLOOD BY AUTOMATED COUNT: 13.6 % (ref 11.5–14.5)
GLUCOSE SERPL-MCNC: 95 MG/DL (ref 74–109)
GLUCOSE UR STRIP.AUTO-MCNC: NEGATIVE MG/DL
HCT VFR BLD AUTO: 47 % (ref 37–47)
HDLC SERPL-MCNC: 82 MG/DL (ref 65–121)
HGB BLD-MCNC: 14.8 G/DL (ref 12–16)
HGB UR STRIP.AUTO-MCNC: NEGATIVE MG/L
HYALINE CASTS #/AREA URNS AUTO: 0 /HPF (ref 0–8)
IMM GRANULOCYTES # BLD: 0 K/UL
KETONES UR STRIP.AUTO-MCNC: NEGATIVE MG/DL
LDLC SERPL CALC-MCNC: 97 MG/DL
LEUKOCYTE ESTERASE UR QL STRIP.AUTO: ABNORMAL
LYMPHOCYTES # BLD: 2.1 K/UL (ref 1.1–4.5)
LYMPHOCYTES NFR BLD: 37.3 % (ref 20–40)
MCH RBC QN AUTO: 30.4 PG (ref 27–31)
MCHC RBC AUTO-ENTMCNC: 31.5 G/DL (ref 33–37)
MCV RBC AUTO: 96.5 FL (ref 81–99)
MONOCYTES # BLD: 0.4 K/UL (ref 0–0.9)
MONOCYTES NFR BLD: 7.7 % (ref 0–10)
NEUTROPHILS # BLD: 3 K/UL (ref 1.5–7.5)
NEUTS SEG NFR BLD: 52.7 % (ref 50–65)
NITRITE UR QL STRIP.AUTO: NEGATIVE
PH UR STRIP.AUTO: 6 [PH] (ref 5–8)
PLATELET # BLD AUTO: 180 K/UL (ref 130–400)
PMV BLD AUTO: 12.7 FL (ref 9.4–12.3)
POTASSIUM SERPL-SCNC: 3.8 MMOL/L (ref 3.5–5)
PROT SERPL-MCNC: 7 G/DL (ref 6.6–8.7)
PROT UR STRIP.AUTO-MCNC: NEGATIVE MG/DL
RBC # BLD AUTO: 4.87 M/UL (ref 4.2–5.4)
RBC #/AREA URNS AUTO: 1 /HPF (ref 0–4)
SODIUM SERPL-SCNC: 141 MMOL/L (ref 136–145)
SP GR UR STRIP.AUTO: 1.02 (ref 1–1.03)
TRIGL SERPL-MCNC: 116 MG/DL (ref 0–149)
TSH SERPL DL<=0.005 MIU/L-ACNC: 3.27 UIU/ML (ref 0.27–4.2)
UROBILINOGEN UR STRIP.AUTO-MCNC: 0.2 E.U./DL
WBC # BLD AUTO: 5.6 K/UL (ref 4.8–10.8)
WBC #/AREA URNS AUTO: 6 /HPF (ref 0–5)

## 2023-06-07 PROCEDURE — 99396 PREV VISIT EST AGE 40-64: CPT | Performed by: INTERNAL MEDICINE

## 2023-06-07 PROCEDURE — 73562 X-RAY EXAM OF KNEE 3: CPT

## 2023-06-07 RX ORDER — TEMAZEPAM 30 MG/1
CAPSULE ORAL
Qty: 30 CAPSULE | Refills: 2 | Status: SHIPPED | OUTPATIENT
Start: 2023-06-07 | End: 2023-09-26

## 2023-06-07 RX ORDER — DESVENLAFAXINE SUCCINATE 50 MG/1
TABLET, EXTENDED RELEASE ORAL
Qty: 30 TABLET | Refills: 5 | Status: SHIPPED | OUTPATIENT
Start: 2023-06-07

## 2023-06-07 ASSESSMENT — ENCOUNTER SYMPTOMS
CONSTIPATION: 0
ABDOMINAL PAIN: 0
COUGH: 0
CHEST TIGHTNESS: 0
SORE THROAT: 0
WHEEZING: 0

## 2023-06-07 NOTE — PROGRESS NOTES
Chief Complaint   Patient presents with    Annual Exam     History of presenting illness:  Chelo Moreira is a64 y.o. female who presents today for follow up on her chronic medical conditions as noted below.     Patient Active Problem List    Diagnosis Date Noted    Acne rosacea 10/05/2022     Priority: Medium    Rash 10/05/2022     Priority: Medium    Exposure to influenza 08/29/2019    Status post dilatation of esophageal stricture 04/22/2019    Primary insomnia 07/17/2018    Osteopenia of multiple sites 07/17/2018     Overview Note:     3/2018  * lumbar spine average T score is -1.45, she does have T score of -2.2 at L4  * Left hip T score -1.8, neck -1.9,troch -2.1  * Right hip total -1.6, neck -1.9,troch -1.8  * Radius total -1.5  5/2020 Lumbar T score -1.7( L4 -2.4)/ hip bilaterally -1.5/ -1.3   6/2022 - hip neck -.2.2/ L spine -2.0        Orthostatic dizziness 07/17/2018    Generalized anxiety disorder 04/17/2018    Menopause ovarian failure 04/17/2018     Past Medical History:   Diagnosis Date    Allergic rhinitis     Anxiety     Arthritis     Osetopenia    Irritable bowel syndrome     Irritable bowel syndrome (IBS)     Sinus infection       Past Surgical History:   Procedure Laterality Date    BLADDER SURGERY  2013    vaginal tape    COLONOSCOPY  10/26/2015    Dr Allison Carrillo AP, active colitis    COLONOSCOPY N/A 02/09/2021    Dr Vijaya Younger, Int Hemorrhoids Grade 1, 5 year recall    HYSTERECTOMY (CERVIX STATUS UNKNOWN)      one ovary left    OVARY REMOVAL Right     SINUS SURGERY      TONSILLECTOMY      UPPER GASTROINTESTINAL ENDOSCOPY  10/16/2015    Dr Rocky Dillard-w/balloon dil-18mm- Hiatal hernia, GEJ stricture, erythema in mid to distal esophagus-    UPPER GASTROINTESTINAL ENDOSCOPY  08/23/2017    Dr Drew An-w/balloon [id number] hernia, normal path    UPPER GASTROINTESTINAL ENDOSCOPY  03/15/2019    Dr Rocky Dillard-w/balloon

## 2023-06-08 ENCOUNTER — TELEPHONE (OUTPATIENT)
Dept: INTERNAL MEDICINE | Age: 58
End: 2023-06-08

## 2023-06-08 DIAGNOSIS — M25.561 CHRONIC PAIN OF RIGHT KNEE: Primary | ICD-10-CM

## 2023-06-08 DIAGNOSIS — G89.29 CHRONIC PAIN OF RIGHT KNEE: Primary | ICD-10-CM

## 2023-06-08 NOTE — TELEPHONE ENCOUNTER
----- Message from Amber Manuel MD sent at 6/7/2023  4:45 PM CDT -----  Right knee x-ray does not show any significant joint abnormality  It could be pain associated with tendon/tendinitis  Since patient has pain and some associated swelling of the knee area suggest appointment with Dr. Arsen Choudhary    Has seen him in past

## 2023-06-11 LAB
AMPHETAMINES UR QL: NEGATIVE NG/ML
BARBITURATES UR QL: NEGATIVE NG/ML
BENZODIAZ UR QL: NEGATIVE NG/ML
CANNABINOIDS UR QL SCN: NEGATIVE NG/ML
COCAINE UR QL: NEGATIVE NG/ML
CREAT UR-MCNC: 20.5 MG/DL (ref 20–400)
DRUG SCREEN COMMENT UR-IMP: NORMAL
MDMA CTO UR SCN-MCNC: NEGATIVE NG/ML
METHADONE UR QL: NEGATIVE NG/ML
OPIATES UR QL: NEGATIVE NG/ML
OXYCODONE CTO UR SCN-MCNC: NEGATIVE NG/ML
PCP UR QL SCN: NEGATIVE NG/ML
PROPOXYPH UR QL: NEGATIVE NG/ML

## 2023-07-24 ENCOUNTER — HOSPITAL ENCOUNTER (OUTPATIENT)
Dept: WOMENS IMAGING | Age: 58
Discharge: HOME | End: 2023-07-24
Payer: COMMERCIAL

## 2023-07-24 DIAGNOSIS — Z12.31 ENCOUNTER FOR SCREENING MAMMOGRAM FOR BREAST CANCER: ICD-10-CM

## 2023-07-24 PROCEDURE — 77067 SCR MAMMO BI INCL CAD: CPT | Performed by: GENERAL PRACTICE

## 2023-07-24 PROCEDURE — 77063 BREAST TOMOSYNTHESIS BI: CPT

## 2023-09-07 ENCOUNTER — OFFICE VISIT (OUTPATIENT)
Dept: INTERNAL MEDICINE | Age: 58
End: 2023-09-07
Payer: COMMERCIAL

## 2023-09-07 VITALS
BODY MASS INDEX: 22.08 KG/M2 | HEART RATE: 66 BPM | OXYGEN SATURATION: 99 % | HEIGHT: 66 IN | SYSTOLIC BLOOD PRESSURE: 118 MMHG | DIASTOLIC BLOOD PRESSURE: 80 MMHG | WEIGHT: 137.4 LBS

## 2023-09-07 DIAGNOSIS — F41.1 GENERALIZED ANXIETY DISORDER: ICD-10-CM

## 2023-09-07 DIAGNOSIS — F51.01 PRIMARY INSOMNIA: Primary | ICD-10-CM

## 2023-09-07 DIAGNOSIS — Z23 NEED FOR VACCINATION: ICD-10-CM

## 2023-09-07 PROCEDURE — 90674 CCIIV4 VAC NO PRSV 0.5 ML IM: CPT | Performed by: INTERNAL MEDICINE

## 2023-09-07 PROCEDURE — 99213 OFFICE O/P EST LOW 20 MIN: CPT | Performed by: INTERNAL MEDICINE

## 2023-09-07 PROCEDURE — 90471 IMMUNIZATION ADMIN: CPT | Performed by: INTERNAL MEDICINE

## 2023-09-07 RX ORDER — TEMAZEPAM 30 MG/1
CAPSULE ORAL
Qty: 30 CAPSULE | Refills: 2 | Status: SHIPPED | OUTPATIENT
Start: 2023-09-07 | End: 2023-12-27

## 2023-09-07 ASSESSMENT — ENCOUNTER SYMPTOMS
COUGH: 0
CHEST TIGHTNESS: 0
SORE THROAT: 0
ABDOMINAL PAIN: 0
CONSTIPATION: 0
WHEEZING: 0

## 2023-09-07 NOTE — PROGRESS NOTES
Chief Complaint   Patient presents with    3 Month Follow-Up     History of presenting illness:  Pavithra Allen is a64 y.o. female who presents today for follow up on her chronic medical conditions as noted below.       Patient Active Problem List    Diagnosis Date Noted    Acne rosacea 10/05/2022     Priority: Medium    Rash 10/05/2022     Priority: Medium    Exposure to influenza 08/29/2019    Status post dilatation of esophageal stricture 04/22/2019    Primary insomnia 07/17/2018    Osteopenia of multiple sites 07/17/2018     Overview Note:     3/2018  * lumbar spine average T score is -1.45, she does have T score of -2.2 at L4  * Left hip T score -1.8, neck -1.9,troch -2.1  * Right hip total -1.6, neck -1.9,troch -1.8  * Radius total -1.5  5/2020 Lumbar T score -1.7( L4 -2.4)/ hip bilaterally -1.5/ -1.3   6/2022 - hip neck -.2.2/ L spine -2.0        Orthostatic dizziness 07/17/2018    Generalized anxiety disorder 04/17/2018    Menopause ovarian failure 04/17/2018     Past Medical History:   Diagnosis Date    Allergic rhinitis     Anxiety     Arthritis     Osetopenia    Irritable bowel syndrome     Irritable bowel syndrome (IBS)     Sinus infection       Past Surgical History:   Procedure Laterality Date    BLADDER SURGERY  2013    vaginal tape    COLONOSCOPY  10/26/2015    Dr Renee Klein AP, active colitis    COLONOSCOPY N/A 02/09/2021    Dr Jessie Garrison, Int Hemorrhoids Grade 1, 5 year recall    HYSTERECTOMY (CERVIX STATUS UNKNOWN)      one ovary left    OVARY REMOVAL Right     SINUS SURGERY      TONSILLECTOMY      UPPER GASTROINTESTINAL ENDOSCOPY  10/16/2015    Dr Judy Dillard-w/balloon dil-18mm- Hiatal hernia, GEJ stricture, erythema in mid to distal esophagus-    UPPER GASTROINTESTINAL ENDOSCOPY  08/23/2017    Dr Nitesh An-w/balloon [id number] hernia, normal path    UPPER GASTROINTESTINAL ENDOSCOPY  03/15/2019    Dr Judy Dillard-w/balloon

## 2023-12-11 ENCOUNTER — OFFICE VISIT (OUTPATIENT)
Dept: INTERNAL MEDICINE | Age: 58
End: 2023-12-11
Payer: COMMERCIAL

## 2023-12-11 VITALS
OXYGEN SATURATION: 98 % | HEIGHT: 66 IN | SYSTOLIC BLOOD PRESSURE: 108 MMHG | WEIGHT: 138.2 LBS | BODY MASS INDEX: 22.21 KG/M2 | DIASTOLIC BLOOD PRESSURE: 70 MMHG | HEART RATE: 88 BPM

## 2023-12-11 DIAGNOSIS — F51.01 PRIMARY INSOMNIA: ICD-10-CM

## 2023-12-11 DIAGNOSIS — F41.1 GENERALIZED ANXIETY DISORDER: Primary | ICD-10-CM

## 2023-12-11 PROCEDURE — 99213 OFFICE O/P EST LOW 20 MIN: CPT | Performed by: INTERNAL MEDICINE

## 2023-12-11 RX ORDER — TEMAZEPAM 30 MG/1
CAPSULE ORAL
Qty: 30 CAPSULE | Refills: 2 | Status: SHIPPED | OUTPATIENT
Start: 2023-12-11 | End: 2024-03-31

## 2023-12-11 RX ORDER — DESVENLAFAXINE SUCCINATE 50 MG/1
TABLET, EXTENDED RELEASE ORAL
Qty: 30 TABLET | Refills: 5 | Status: SHIPPED | OUTPATIENT
Start: 2023-12-11

## 2023-12-11 ASSESSMENT — ENCOUNTER SYMPTOMS
COUGH: 0
SORE THROAT: 0
CHEST TIGHTNESS: 0
CONSTIPATION: 0
WHEEZING: 0
ABDOMINAL PAIN: 0

## 2024-02-29 ENCOUNTER — APPOINTMENT (OUTPATIENT)
Dept: CT IMAGING | Age: 59
End: 2024-02-29
Payer: COMMERCIAL

## 2024-02-29 ENCOUNTER — HOSPITAL ENCOUNTER (EMERGENCY)
Age: 59
Discharge: HOME | End: 2024-02-29
Attending: EMERGENCY MEDICINE
Payer: COMMERCIAL

## 2024-02-29 VITALS
SYSTOLIC BLOOD PRESSURE: 131 MMHG | DIASTOLIC BLOOD PRESSURE: 69 MMHG | TEMPERATURE: 97.9 F | WEIGHT: 137 LBS | HEART RATE: 58 BPM | OXYGEN SATURATION: 100 % | RESPIRATION RATE: 16 BRPM | BODY MASS INDEX: 22.11 KG/M2

## 2024-02-29 DIAGNOSIS — G24.5 BLEPHAROSPASM OF RIGHT EYE: Primary | ICD-10-CM

## 2024-02-29 PROCEDURE — 70450 CT HEAD/BRAIN W/O DYE: CPT

## 2024-02-29 PROCEDURE — 99284 EMERGENCY DEPT VISIT MOD MDM: CPT

## 2024-02-29 ASSESSMENT — LIFESTYLE VARIABLES
HOW MANY STANDARD DRINKS CONTAINING ALCOHOL DO YOU HAVE ON A TYPICAL DAY: 1 OR 2
HOW OFTEN DO YOU HAVE A DRINK CONTAINING ALCOHOL: 2-3 TIMES A WEEK

## 2024-02-29 ASSESSMENT — ENCOUNTER SYMPTOMS
PHOTOPHOBIA: 0
GASTROINTESTINAL NEGATIVE: 1
EYE PAIN: 0
EYE ITCHING: 0
EYE REDNESS: 0
TROUBLE SWALLOWING: 0
RESPIRATORY NEGATIVE: 1
EYE DISCHARGE: 0
ALLERGIC/IMMUNOLOGIC NEGATIVE: 1
SINUS PAIN: 1
FACIAL SWELLING: 0

## 2024-02-29 NOTE — DISCHARGE INSTRUCTIONS
I suspect you have blepharospasm caused by stress; lack of sleep; lack of sleep and / or your medications.  Monitor your symptoms carefully and return for any new or worsening symptoms especially with facial muscle weakness which may be consistent with Bell's palsy or stroke.  Follow-up with your primary care doctor for further treatment and evaluation.  Return immediately to the emergency room for any new or worsening symptoms.

## 2024-02-29 NOTE — ED PROVIDER NOTES
There is no mass.      Tenderness: There is no abdominal tenderness. There is no guarding or rebound.   Musculoskeletal:         General: No tenderness. Normal range of motion.      Cervical back: Normal range of motion and neck supple.   Skin:     General: Skin is warm and dry.      Capillary Refill: Capillary refill takes less than 2 seconds.   Neurological:      General: No focal deficit present.      Mental Status: She is alert and oriented to person, place, and time.      Cranial Nerves: No cranial nerve deficit.      Comments: Intermittent fasciculation of inferior lateral right thigh muscles without fluttering of the eyelid; otherwise facial musculature has no weakness and smile and wrinkling of the forehead is symmetric; notes some paresthesia emanating from where the muscle twitches.   Psychiatric:         Behavior: Behavior normal.         Thought Content: Thought content normal.         Judgment: Judgment normal.         DIAGNOSTIC RESULTS         RADIOLOGY:  Non-plain film images such as CT, Ultrasound and MRI are read by the radiologist. Plain radiographic images are visualized and preliminarily interpreted bythe emergency physician with the below findings:          CT HEAD WO CONTRAST   Final Result       No acute intracranial abnormality.        All CT scans are performed using dose optimization techniques as appropriate to the performed exam and include    at least one of the following: Automated exposure control, adjustment of the mA and/or kV according to size, and the use of iterative reconstruction technique.        ______________________________________    Electronically signed by: SHANNAN LE D.O.   Date:     02/29/2024   Time:    13:45               LABS:  Labs Reviewed - No data to display    All other labs were within normal range or not returned as of this dictation.    EMERGENCY DEPARTMENT COURSE and DIFFERENTIAL DIAGNOSIS/MDM:   Vitals:    Vitals:    02/29/24 1255 02/29/24 1444   BP:

## 2024-03-04 ENCOUNTER — OFFICE VISIT (OUTPATIENT)
Dept: INTERNAL MEDICINE | Age: 59
End: 2024-03-04
Payer: COMMERCIAL

## 2024-03-04 VITALS
HEIGHT: 66 IN | DIASTOLIC BLOOD PRESSURE: 78 MMHG | OXYGEN SATURATION: 96 % | HEART RATE: 78 BPM | WEIGHT: 138 LBS | SYSTOLIC BLOOD PRESSURE: 118 MMHG | BODY MASS INDEX: 22.18 KG/M2

## 2024-03-04 DIAGNOSIS — R03.0 ELEVATED BLOOD PRESSURE READING: ICD-10-CM

## 2024-03-04 DIAGNOSIS — F51.01 PRIMARY INSOMNIA: ICD-10-CM

## 2024-03-04 DIAGNOSIS — R20.0 FACIAL NUMBNESS: Primary | ICD-10-CM

## 2024-03-04 DIAGNOSIS — R25.3 EYELID TWITCH: ICD-10-CM

## 2024-03-04 DIAGNOSIS — F41.1 GENERALIZED ANXIETY DISORDER: ICD-10-CM

## 2024-03-04 PROCEDURE — 99214 OFFICE O/P EST MOD 30 MIN: CPT | Performed by: INTERNAL MEDICINE

## 2024-03-04 RX ORDER — TEMAZEPAM 30 MG/1
CAPSULE ORAL
Qty: 30 CAPSULE | Refills: 2 | Status: SHIPPED | OUTPATIENT
Start: 2024-03-04 | End: 2024-06-23

## 2024-03-04 ASSESSMENT — ENCOUNTER SYMPTOMS
SORE THROAT: 0
CHEST TIGHTNESS: 0
WHEEZING: 0
CONSTIPATION: 0
ABDOMINAL PAIN: 0
COUGH: 0

## 2024-03-04 ASSESSMENT — PATIENT HEALTH QUESTIONNAIRE - PHQ9
SUM OF ALL RESPONSES TO PHQ QUESTIONS 1-9: 0
SUM OF ALL RESPONSES TO PHQ QUESTIONS 1-9: 0
2. FEELING DOWN, DEPRESSED OR HOPELESS: 0
SUM OF ALL RESPONSES TO PHQ9 QUESTIONS 1 & 2: 0
2. FEELING DOWN, DEPRESSED OR HOPELESS: NOT AT ALL
SUM OF ALL RESPONSES TO PHQ QUESTIONS 1-9: 0
SUM OF ALL RESPONSES TO PHQ9 QUESTIONS 1 & 2: 0
1. LITTLE INTEREST OR PLEASURE IN DOING THINGS: 0
SUM OF ALL RESPONSES TO PHQ QUESTIONS 1-9: 0
1. LITTLE INTEREST OR PLEASURE IN DOING THINGS: NOT AT ALL

## 2024-03-04 NOTE — PROGRESS NOTES
Chief Complaint   Patient presents with    3 Month Follow-Up     History of presenting illness:  Roxy Cornelius is a58 y.o. female who presents today for follow up on her chronic medical conditions as noted below.    Patient Active Problem List    Diagnosis Date Noted    Acne rosacea 10/05/2022     Priority: Medium    Rash 10/05/2022     Priority: Medium    Exposure to influenza 08/29/2019    Status post dilatation of esophageal stricture 04/22/2019    Primary insomnia 07/17/2018    Osteopenia of multiple sites 07/17/2018     Overview Note:     3/2018  * lumbar spine average T score is -1.45, she does have T score of -2.2 at L4  * Left hip T score -1.8, neck -1.9,troch -2.1  * Right hip total -1.6, neck -1.9,troch -1.8  * Radius total -1.5  5/2020 Lumbar T score -1.7( L4 -2.4)/ hip bilaterally -1.5/ -1.3   6/2022 - hip neck -.2.2/ L spine -2.0        Orthostatic dizziness 07/17/2018    Generalized anxiety disorder 04/17/2018    Menopause ovarian failure 04/17/2018     Past Medical History:   Diagnosis Date    Allergic rhinitis     Anxiety     Arthritis     Osetopenia    Irritable bowel syndrome     Irritable bowel syndrome (IBS)     Sinus infection       Past Surgical History:   Procedure Laterality Date    BLADDER SURGERY  2013    vaginal tape    COLONOSCOPY  10/26/2015    Dr Nelson Dillard-Tubular AP, active colitis    COLONOSCOPY N/A 02/09/2021    Dr Moffett, Int Hemorrhoids Grade 1, 5 year recall    HYSTERECTOMY (CERVIX STATUS UNKNOWN)      one ovary left    OVARY REMOVAL Right     SINUS SURGERY      TONSILLECTOMY      UPPER GASTROINTESTINAL ENDOSCOPY  10/16/2015    Dr Nelson Dillard-w/balloon dil-18mm- Hiatal hernia, GEJ stricture, erythema in mid to distal esophagus-    UPPER GASTROINTESTINAL ENDOSCOPY  08/23/2017    Dr Nelson Reedw/balloon [id number] hernia, normal path    UPPER GASTROINTESTINAL ENDOSCOPY  03/15/2019    Dr Nelson Dillard-w/balloon

## 2024-05-16 ENCOUNTER — TELEPHONE (OUTPATIENT)
Dept: INTERNAL MEDICINE | Age: 59
End: 2024-05-16

## 2024-05-16 NOTE — TELEPHONE ENCOUNTER
Reviewed her bone density results   It shows osteopenia, compared to 2 years ago it is very slightly improved   Recommendations at this time   #1 make sure  she takes her vitamin D supplement   #2 exercise, specifically weightbearing exercise is recommended         ----- Message -----   From: Salena Perez MA   Sent: 5/16/2024  12:06 PM CDT   To: Gail Deal MD   Subject: Edit                                              The scan below was edited by Salena Perez MA on 05/16/2024 at 12:06; it is attached to the following: the 05/15/2024  DEXA SCAN [[id number]]       Pt aware

## 2024-05-30 DIAGNOSIS — G89.29 CHRONIC PAIN OF RIGHT KNEE: ICD-10-CM

## 2024-05-30 DIAGNOSIS — Z79.899 MEDICATION MANAGEMENT: ICD-10-CM

## 2024-05-30 DIAGNOSIS — Z00.00 ANNUAL PHYSICAL EXAM: ICD-10-CM

## 2024-05-30 DIAGNOSIS — E55.9 VITAMIN D DEFICIENCY: ICD-10-CM

## 2024-05-30 DIAGNOSIS — F51.01 PRIMARY INSOMNIA: ICD-10-CM

## 2024-05-30 DIAGNOSIS — M25.561 CHRONIC PAIN OF RIGHT KNEE: ICD-10-CM

## 2024-05-30 DIAGNOSIS — Z12.31 ENCOUNTER FOR SCREENING MAMMOGRAM FOR BREAST CANCER: ICD-10-CM

## 2024-05-30 LAB
25(OH)D3 SERPL-MCNC: 65 NG/ML
ALBUMIN SERPL-MCNC: 4.1 G/DL (ref 3.5–5.2)
ALP SERPL-CCNC: 78 U/L (ref 35–104)
ALT SERPL-CCNC: 26 U/L (ref 5–33)
ANION GAP SERPL CALCULATED.3IONS-SCNC: 8 MMOL/L (ref 7–19)
AST SERPL-CCNC: 27 U/L (ref 5–32)
BACTERIA URNS QL MICRO: NORMAL /HPF
BASOPHILS # BLD: 0 K/UL (ref 0–0.2)
BASOPHILS NFR BLD: 0.6 % (ref 0–1)
BILIRUB SERPL-MCNC: 0.6 MG/DL (ref 0.2–1.2)
BILIRUB UR QL STRIP: NEGATIVE
BUN SERPL-MCNC: 13 MG/DL (ref 6–20)
CALCIUM SERPL-MCNC: 9.6 MG/DL (ref 8.6–10)
CHLORIDE SERPL-SCNC: 101 MMOL/L (ref 98–111)
CLARITY UR: CLEAR
CO2 SERPL-SCNC: 30 MMOL/L (ref 22–29)
COLOR UR: YELLOW
CREAT SERPL-MCNC: 0.7 MG/DL (ref 0.5–0.9)
CRYSTALS URNS MICRO: NORMAL /HPF
EOSINOPHIL # BLD: 0.1 K/UL (ref 0–0.6)
EOSINOPHIL NFR BLD: 1.7 % (ref 0–5)
EPI CELLS #/AREA URNS AUTO: 2 /HPF (ref 0–5)
ERYTHROCYTE [DISTWIDTH] IN BLOOD BY AUTOMATED COUNT: 13.9 % (ref 11.5–14.5)
GLUCOSE SERPL-MCNC: 95 MG/DL (ref 74–109)
GLUCOSE UR STRIP.AUTO-MCNC: NEGATIVE MG/DL
HBA1C MFR BLD: 5.5 % (ref 4–6)
HCT VFR BLD AUTO: 44.1 % (ref 37–47)
HGB BLD-MCNC: 14 G/DL (ref 12–16)
HGB UR STRIP.AUTO-MCNC: NEGATIVE MG/L
HYALINE CASTS #/AREA URNS AUTO: 0 /HPF (ref 0–8)
IMM GRANULOCYTES # BLD: 0 K/UL
KETONES UR STRIP.AUTO-MCNC: NEGATIVE MG/DL
LEUKOCYTE ESTERASE UR QL STRIP.AUTO: ABNORMAL
LYMPHOCYTES # BLD: 2 K/UL (ref 1.1–4.5)
LYMPHOCYTES NFR BLD: 38.1 % (ref 20–40)
MCH RBC QN AUTO: 30.2 PG (ref 27–31)
MCHC RBC AUTO-ENTMCNC: 31.7 G/DL (ref 33–37)
MCV RBC AUTO: 95 FL (ref 81–99)
MONOCYTES # BLD: 0.5 K/UL (ref 0–0.9)
MONOCYTES NFR BLD: 9.9 % (ref 0–10)
NEUTROPHILS # BLD: 2.7 K/UL (ref 1.5–7.5)
NEUTS SEG NFR BLD: 49.5 % (ref 50–65)
NITRITE UR QL STRIP.AUTO: POSITIVE
PH UR STRIP.AUTO: 7 [PH] (ref 5–8)
PLATELET # BLD AUTO: 173 K/UL (ref 130–400)
PMV BLD AUTO: 12 FL (ref 9.4–12.3)
POTASSIUM SERPL-SCNC: 4.7 MMOL/L (ref 3.5–5)
PROT SERPL-MCNC: 6.8 G/DL (ref 6.6–8.7)
PROT UR STRIP.AUTO-MCNC: NEGATIVE MG/DL
RBC # BLD AUTO: 4.64 M/UL (ref 4.2–5.4)
RBC #/AREA URNS AUTO: 2 /HPF (ref 0–4)
SODIUM SERPL-SCNC: 139 MMOL/L (ref 136–145)
SP GR UR STRIP.AUTO: 1.01 (ref 1–1.03)
TSH SERPL DL<=0.005 MIU/L-ACNC: 4.72 UIU/ML (ref 0.27–4.2)
UROBILINOGEN UR STRIP.AUTO-MCNC: 0.2 E.U./DL
WBC # BLD AUTO: 5.4 K/UL (ref 4.8–10.8)
WBC #/AREA URNS AUTO: 4 /HPF (ref 0–5)

## 2024-05-31 DIAGNOSIS — Z13.220 SCREENING FOR LIPID DISORDERS: ICD-10-CM

## 2024-05-31 DIAGNOSIS — Z13.220 SCREENING FOR LIPID DISORDERS: Primary | ICD-10-CM

## 2024-05-31 LAB
CHOLEST SERPL-MCNC: 208 MG/DL (ref 160–199)
HDLC SERPL-MCNC: 87 MG/DL (ref 65–121)
LDLC SERPL CALC-MCNC: 108 MG/DL
TRIGL SERPL-MCNC: 65 MG/DL (ref 0–149)

## 2024-06-03 ENCOUNTER — OFFICE VISIT (OUTPATIENT)
Dept: INTERNAL MEDICINE | Age: 59
End: 2024-06-03
Payer: COMMERCIAL

## 2024-06-03 VITALS
BODY MASS INDEX: 22.18 KG/M2 | WEIGHT: 138 LBS | SYSTOLIC BLOOD PRESSURE: 110 MMHG | HEART RATE: 98 BPM | OXYGEN SATURATION: 98 % | DIASTOLIC BLOOD PRESSURE: 78 MMHG | HEIGHT: 66 IN

## 2024-06-03 DIAGNOSIS — Z00.00 ANNUAL PHYSICAL EXAM: ICD-10-CM

## 2024-06-03 DIAGNOSIS — K59.09 CHRONIC CONSTIPATION: ICD-10-CM

## 2024-06-03 DIAGNOSIS — E55.9 VITAMIN D DEFICIENCY: ICD-10-CM

## 2024-06-03 DIAGNOSIS — R35.0 URINARY FREQUENCY: ICD-10-CM

## 2024-06-03 DIAGNOSIS — Z13.220 SCREENING FOR LIPID DISORDERS: ICD-10-CM

## 2024-06-03 DIAGNOSIS — F51.01 PRIMARY INSOMNIA: ICD-10-CM

## 2024-06-03 DIAGNOSIS — F41.1 GENERALIZED ANXIETY DISORDER: ICD-10-CM

## 2024-06-03 DIAGNOSIS — E78.00 PURE HYPERCHOLESTEROLEMIA: ICD-10-CM

## 2024-06-03 DIAGNOSIS — E03.8 SUBCLINICAL HYPOTHYROIDISM: Primary | ICD-10-CM

## 2024-06-03 LAB
BACTERIA URNS QL MICRO: NEGATIVE /HPF
BILIRUB UR QL STRIP: NEGATIVE
CLARITY UR: CLEAR
COLOR UR: YELLOW
CRYSTALS URNS MICRO: ABNORMAL /HPF
EPI CELLS #/AREA URNS AUTO: 1 /HPF (ref 0–5)
GLUCOSE UR STRIP.AUTO-MCNC: NEGATIVE MG/DL
HGB UR STRIP.AUTO-MCNC: NEGATIVE MG/L
HYALINE CASTS #/AREA URNS AUTO: 0 /HPF (ref 0–8)
KETONES UR STRIP.AUTO-MCNC: NEGATIVE MG/DL
LEUKOCYTE ESTERASE UR QL STRIP.AUTO: ABNORMAL
NITRITE UR QL STRIP.AUTO: NEGATIVE
PH UR STRIP.AUTO: 7 [PH] (ref 5–8)
PROT UR STRIP.AUTO-MCNC: NEGATIVE MG/DL
RBC #/AREA URNS AUTO: 0 /HPF (ref 0–4)
SP GR UR STRIP.AUTO: 1 (ref 1–1.03)
T4 FREE SERPL-MCNC: 0.97 NG/DL (ref 0.93–1.7)
UROBILINOGEN UR STRIP.AUTO-MCNC: 0.2 E.U./DL
WBC #/AREA URNS AUTO: 1 /HPF (ref 0–5)

## 2024-06-03 PROCEDURE — 99396 PREV VISIT EST AGE 40-64: CPT | Performed by: INTERNAL MEDICINE

## 2024-06-03 RX ORDER — DESVENLAFAXINE SUCCINATE 50 MG/1
TABLET, EXTENDED RELEASE ORAL
Qty: 30 TABLET | Refills: 5 | Status: SHIPPED | OUTPATIENT
Start: 2024-06-03

## 2024-06-03 RX ORDER — TEMAZEPAM 30 MG/1
CAPSULE ORAL
Qty: 30 CAPSULE | Refills: 2 | Status: SHIPPED | OUTPATIENT
Start: 2024-06-03 | End: 2024-09-22

## 2024-06-03 RX ORDER — SENNOSIDES A AND B 8.6 MG/1
1 TABLET, FILM COATED ORAL DAILY
COMMUNITY

## 2024-06-03 SDOH — ECONOMIC STABILITY: FOOD INSECURITY: WITHIN THE PAST 12 MONTHS, YOU WORRIED THAT YOUR FOOD WOULD RUN OUT BEFORE YOU GOT MONEY TO BUY MORE.: NEVER TRUE

## 2024-06-03 SDOH — ECONOMIC STABILITY: INCOME INSECURITY: HOW HARD IS IT FOR YOU TO PAY FOR THE VERY BASICS LIKE FOOD, HOUSING, MEDICAL CARE, AND HEATING?: NOT HARD AT ALL

## 2024-06-03 SDOH — ECONOMIC STABILITY: FOOD INSECURITY: WITHIN THE PAST 12 MONTHS, THE FOOD YOU BOUGHT JUST DIDN'T LAST AND YOU DIDN'T HAVE MONEY TO GET MORE.: NEVER TRUE

## 2024-06-03 ASSESSMENT — ENCOUNTER SYMPTOMS
CONSTIPATION: 0
WHEEZING: 0
COUGH: 0
SORE THROAT: 0
ABDOMINAL PAIN: 0
CHEST TIGHTNESS: 0

## 2024-06-03 ASSESSMENT — PATIENT HEALTH QUESTIONNAIRE - PHQ9
2. FEELING DOWN, DEPRESSED OR HOPELESS: NOT AT ALL
SUM OF ALL RESPONSES TO PHQ QUESTIONS 1-9: 0
1. LITTLE INTEREST OR PLEASURE IN DOING THINGS: NOT AT ALL
SUM OF ALL RESPONSES TO PHQ QUESTIONS 1-9: 0
SUM OF ALL RESPONSES TO PHQ9 QUESTIONS 1 & 2: 0
SUM OF ALL RESPONSES TO PHQ QUESTIONS 1-9: 0
SUM OF ALL RESPONSES TO PHQ QUESTIONS 1-9: 0

## 2024-06-03 NOTE — PROGRESS NOTES
05/30/2024 2     Epithelial Cells, UA 05/30/2024 2          Review of Systems   Constitutional:  Negative for chills, fatigue and fever.   HENT:  Negative for congestion, ear pain, nosebleeds, postnasal drip and sore throat.    Respiratory:  Negative for cough, chest tightness and wheezing.    Cardiovascular:  Negative for chest pain, palpitations and leg swelling.   Gastrointestinal:  Negative for abdominal pain and constipation.   Genitourinary:  Negative for dysuria and urgency.   Musculoskeletal: Negative.  Negative for arthralgias.   Skin:  Negative for rash.   Neurological:  Negative for dizziness and headaches.   Psychiatric/Behavioral: Negative.            Vitals:    06/03/24 1231   BP: 110/78   Pulse: 98   SpO2: 98%   Weight: 62.6 kg (138 lb)   Height: 1.676 m (5' 6\")      Wt Readings from Last 3 Encounters:   06/03/24 62.6 kg (138 lb)   03/04/24 62.6 kg (138 lb)   02/29/24 62.1 kg (137 lb)   Body mass index is 22.27 kg/m².  BP Readings from Last 3 Encounters:   06/03/24 110/78   03/04/24 118/78   02/29/24 131/69       Physical Exam  Constitutional:       Appearance: She is well-developed.   HENT:      Right Ear: External ear normal.      Left Ear: External ear normal.      Mouth/Throat:      Pharynx: No oropharyngeal exudate.   Eyes:      Conjunctiva/sclera: Conjunctivae normal.      Pupils: Pupils are equal, round, and reactive to light.   Neck:      Thyroid: No thyromegaly.      Vascular: No JVD.   Cardiovascular:      Rate and Rhythm: Normal rate.      Heart sounds: Normal heart sounds. No murmur heard.  Pulmonary:      Effort: No respiratory distress.      Breath sounds: Normal breath sounds. No wheezing or rales.   Chest:      Chest wall: No tenderness.   Abdominal:      General: Bowel sounds are normal.      Palpations: Abdomen is soft.   Musculoskeletal:      Cervical back: Neck supple.   Lymphadenopathy:      Cervical: No cervical adenopathy.   Skin:     General: Skin is warm.      Findings: No

## 2024-06-05 LAB — BACTERIA UR CULT: NORMAL

## 2024-06-17 ENCOUNTER — OFFICE VISIT (OUTPATIENT)
Dept: GASTROENTEROLOGY | Age: 59
End: 2024-06-17
Payer: COMMERCIAL

## 2024-06-17 VITALS
WEIGHT: 140 LBS | HEIGHT: 66 IN | BODY MASS INDEX: 22.5 KG/M2 | DIASTOLIC BLOOD PRESSURE: 70 MMHG | SYSTOLIC BLOOD PRESSURE: 122 MMHG | HEART RATE: 50 BPM | OXYGEN SATURATION: 100 %

## 2024-06-17 DIAGNOSIS — Z86.010 HISTORY OF COLON POLYPS: ICD-10-CM

## 2024-06-17 DIAGNOSIS — Z87.19 HISTORY OF GASTROESOPHAGEAL REFLUX (GERD): ICD-10-CM

## 2024-06-17 DIAGNOSIS — K58.1 IRRITABLE BOWEL SYNDROME WITH CONSTIPATION: Primary | ICD-10-CM

## 2024-06-17 PROCEDURE — 99213 OFFICE O/P EST LOW 20 MIN: CPT | Performed by: INTERNAL MEDICINE

## 2024-06-17 ASSESSMENT — ENCOUNTER SYMPTOMS
RESPIRATORY NEGATIVE: 1
CONSTIPATION: 1
EYES NEGATIVE: 1
ALLERGIC/IMMUNOLOGIC NEGATIVE: 1
DIARRHEA: 1
ABDOMINAL PAIN: 1

## 2024-06-17 NOTE — PROGRESS NOTES
Roxy Cornelius  Primary Care Provider Gail Deal MD  Referral Source No ref. provider found    Roxy Cornelius is a 58 y.o. female  ChiefComplaint:  Chief Complaint   Patient presents with    Colonoscopy       Assessment / Plan:   Diagnosis Orders   1. Irritable bowel syndrome with constipation        2. History of gastroesophageal reflux (GERD)        3. History of colon polyps          -Continue current medications including GlycoLax powder but she can increase daily dosage to 2-3 times along with milk of magnesia once or twice daily with titration of these medications to desired effect of 1-2 soft stools per day not requiring excessive straining or defecation and to avoid diarrhea or constipation for the long-term.    -Small frequent meals and chew her food thoroughly.  Avoid food groups that may contribute to excessive gas or bloating.    -Otherwise continue her current dietary and medication regimen; keep scheduled follow-up appointment with her PCP and other physicians    -Patient will call our office to update on her progress every couple of weeks regarding her IBS control and treatment of constipation GI clinic and will return for follow-up in this GI clinic in 8 weeks; sooner if necessary    -Next colonoscopy for colon cancer surveillance is due in 2026    I have seen, examined and reviewed this patient medication list, appropriate labs and imaging studies. I reviewed relevant medical records and others physician’s notes. I discussed the plans of care with the patient. I answered all the questions to the patient’s satisfaction.    Also, all of the information input into this note by the MA today including chief complaint, past medical history, past surgical history, updated medication list, allergies, social and family history and a ROS (review of systems) has been reviewed by me during the patient's office visit and updated as necessary by me.    Over 50% of the total visit time of 30 minutes today was spent

## 2024-06-20 ASSESSMENT — ENCOUNTER SYMPTOMS
SHORTNESS OF BREATH: 0
EYE DISCHARGE: 0
WHEEZING: 0
VOICE CHANGE: 0
TROUBLE SWALLOWING: 0
CHOKING: 0
EYE REDNESS: 0
ABDOMINAL DISTENTION: 0
CHEST TIGHTNESS: 0
NAUSEA: 0
COUGH: 0
BLOOD IN STOOL: 0
VOMITING: 0
SORE THROAT: 0
EYE PAIN: 0

## 2024-08-05 ENCOUNTER — OFFICE VISIT (OUTPATIENT)
Dept: GASTROENTEROLOGY | Age: 59
End: 2024-08-05

## 2024-08-05 VITALS
BODY MASS INDEX: 22.18 KG/M2 | SYSTOLIC BLOOD PRESSURE: 120 MMHG | OXYGEN SATURATION: 99 % | DIASTOLIC BLOOD PRESSURE: 68 MMHG | HEART RATE: 90 BPM | WEIGHT: 138 LBS | HEIGHT: 66 IN

## 2024-08-05 DIAGNOSIS — Z98.890 STATUS POST DILATATION OF ESOPHAGEAL STRICTURE: ICD-10-CM

## 2024-08-05 DIAGNOSIS — R15.9 FECAL INCONTINENCE WITH INCOMPLETE DEFECATION: ICD-10-CM

## 2024-08-05 DIAGNOSIS — Z83.79 FAMILY HISTORY OF EOSINOPHILIC ESOPHAGITIS: ICD-10-CM

## 2024-08-05 DIAGNOSIS — K58.1 IRRITABLE BOWEL SYNDROME WITH CONSTIPATION: ICD-10-CM

## 2024-08-05 DIAGNOSIS — R13.19 INTERMITTENT DYSPHAGIA: Primary | ICD-10-CM

## 2024-08-05 DIAGNOSIS — Z80.0 FAMILY HISTORY OF MALIGNANT NEOPLASM OF STOMACH: ICD-10-CM

## 2024-08-05 DIAGNOSIS — K30 NONULCER DYSPEPSIA: ICD-10-CM

## 2024-08-05 DIAGNOSIS — R15.0 FECAL INCONTINENCE WITH INCOMPLETE DEFECATION: ICD-10-CM

## 2024-08-05 DIAGNOSIS — Z87.19 STATUS POST DILATATION OF ESOPHAGEAL STRICTURE: ICD-10-CM

## 2024-08-05 DIAGNOSIS — K59.09 CHRONIC CONSTIPATION: ICD-10-CM

## 2024-08-05 DIAGNOSIS — Z86.010 HISTORY OF COLON POLYPS: ICD-10-CM

## 2024-08-05 ASSESSMENT — ENCOUNTER SYMPTOMS
EYES NEGATIVE: 1
CONSTIPATION: 1
TROUBLE SWALLOWING: 1
ALLERGIC/IMMUNOLOGIC NEGATIVE: 1

## 2024-08-05 NOTE — PROGRESS NOTES
An-Patriot-w/balloon [id number] hernia, GEJ stricture, reflux    UPPER GASTROINTESTINAL ENDOSCOPY N/A 01/27/2020    Dr Moffett-w/BRAVO-Benign, neg for GERD    UPPER GASTROINTESTINAL ENDOSCOPY N/A 01/27/2020    Dr Moffett-w/BRAJON-Benign, neg for GERD    UPPER GASTROINTESTINAL ENDOSCOPY N/A 08/06/2024    Dr Moffett, (-) h pylori, W 54 fr dil, 2 sm sessile 3-5 mm benign-appearing polyps in fundus of the stomach,    UPPER GASTROINTESTINAL ENDOSCOPY  08/06/2024    Dr Moffett, Cunningham 54 fr dil    WISDOM TOOTH EXTRACTION        Family History   Problem Relation Age of Onset    Cancer Father         malt    Parkinsonism Father     Stomach Cancer Father         maltoma    Cancer Mother         lung    Lung Cancer Mother     Colon Cancer Neg Hx     Colon Polyps Neg Hx       Current Outpatient Medications   Medication Sig Dispense Refill    temazepam (RESTORIL) 30 MG capsule Take 1 capsule by mouth nightly as needed for anxiety or sleep. 30 capsule 2    desvenlafaxine succinate (PRISTIQ) 50 MG TB24 extended release tablet TAKE ONE TABLET BY MOUTH EVERY DAY 30 tablet 5    senna (SENOKOT) 8.6 MG tablet Take 1 tablet by mouth daily      albuterol sulfate HFA (VENTOLIN HFA) 108 (90 Base) MCG/ACT inhaler Inhale 2 puffs into the lungs 4 times daily as needed for Wheezing 18 g 0    Cholecalciferol (VITAMIN D) 2000 units CAPS capsule Take by mouth      estradiol (ESTRACE) 1 MG tablet Take 1 tablet by mouth daily      polyethylene glycol (GLYCOLAX) powder Take 1 g by mouth as needed      EPINEPHrine (EPIPEN) 0.3 MG/0.3ML SOAJ injection Inject 0.3 mLs into the muscle as needed      magnesium hydroxide (MILK OF MAGNESIA) 400 MG/5ML suspension Take by mouth daily as needed for Constipation Pill form       No current facility-administered medications for this visit.      Allergies   Allergen Reactions    Iohexol Rash     This is IV Contrast    Iodinated Contrast Media Rash    Sulfa Antibiotics

## 2024-08-06 ENCOUNTER — ANESTHESIA (OUTPATIENT)
Dept: OPERATING ROOM | Age: 59
End: 2024-08-06

## 2024-08-06 ENCOUNTER — TRANSCRIBE ORDERS (OUTPATIENT)
Dept: ADMINISTRATIVE | Age: 59
End: 2024-08-06

## 2024-08-06 ENCOUNTER — APPOINTMENT (OUTPATIENT)
Dept: OPERATING ROOM | Age: 59
End: 2024-08-06
Attending: INTERNAL MEDICINE

## 2024-08-06 ENCOUNTER — HOSPITAL ENCOUNTER (OUTPATIENT)
Age: 59
Setting detail: OUTPATIENT SURGERY
Discharge: HOME | End: 2024-08-06
Attending: INTERNAL MEDICINE | Admitting: INTERNAL MEDICINE
Payer: COMMERCIAL

## 2024-08-06 ENCOUNTER — ANESTHESIA EVENT (OUTPATIENT)
Dept: OPERATING ROOM | Age: 59
End: 2024-08-06

## 2024-08-06 ENCOUNTER — HOSPITAL ENCOUNTER (OUTPATIENT)
Age: 59
Setting detail: SPECIMEN
Discharge: HOME | End: 2024-08-06
Payer: COMMERCIAL

## 2024-08-06 VITALS
SYSTOLIC BLOOD PRESSURE: 113 MMHG | OXYGEN SATURATION: 99 % | WEIGHT: 138 LBS | HEIGHT: 66 IN | BODY MASS INDEX: 22.18 KG/M2 | RESPIRATION RATE: 18 BRPM | HEART RATE: 82 BPM | TEMPERATURE: 97.3 F | DIASTOLIC BLOOD PRESSURE: 56 MMHG

## 2024-08-06 DIAGNOSIS — Z12.31 ENCOUNTER FOR SCREENING MAMMOGRAM FOR MALIGNANT NEOPLASM OF BREAST: Primary | ICD-10-CM

## 2024-08-06 PROCEDURE — G8918 PT W/O PREOP ORDER IV AB PRO: HCPCS

## 2024-08-06 PROCEDURE — 88342 IMHCHEM/IMCYTCHM 1ST ANTB: CPT

## 2024-08-06 PROCEDURE — 43239 EGD BIOPSY SINGLE/MULTIPLE: CPT | Performed by: INTERNAL MEDICINE

## 2024-08-06 PROCEDURE — G8907 PT DOC NO EVENTS ON DISCHARG: HCPCS

## 2024-08-06 PROCEDURE — 43239 EGD BIOPSY SINGLE/MULTIPLE: CPT

## 2024-08-06 PROCEDURE — 43450 DILATE ESOPHAGUS 1/MULT PASS: CPT | Performed by: INTERNAL MEDICINE

## 2024-08-06 PROCEDURE — 43450 DILATE ESOPHAGUS 1/MULT PASS: CPT

## 2024-08-06 PROCEDURE — 88305 TISSUE EXAM BY PATHOLOGIST: CPT

## 2024-08-06 RX ORDER — LIDOCAINE HYDROCHLORIDE 10 MG/ML
INJECTION, SOLUTION EPIDURAL; INFILTRATION; INTRACAUDAL; PERINEURAL PRN
Status: DISCONTINUED | OUTPATIENT
Start: 2024-08-06 | End: 2024-08-06 | Stop reason: SDUPTHER

## 2024-08-06 RX ORDER — EPHEDRINE SULFATE 50 MG/ML
INJECTION, SOLUTION INTRAVENOUS PRN
Status: DISCONTINUED | OUTPATIENT
Start: 2024-08-06 | End: 2024-08-06 | Stop reason: SDUPTHER

## 2024-08-06 RX ORDER — FENTANYL CITRATE 50 UG/ML
INJECTION, SOLUTION INTRAMUSCULAR; INTRAVENOUS PRN
Status: DISCONTINUED | OUTPATIENT
Start: 2024-08-06 | End: 2024-08-06 | Stop reason: SDUPTHER

## 2024-08-06 RX ORDER — SODIUM CHLORIDE, SODIUM LACTATE, POTASSIUM CHLORIDE, CALCIUM CHLORIDE 600; 310; 30; 20 MG/100ML; MG/100ML; MG/100ML; MG/100ML
INJECTION, SOLUTION INTRAVENOUS CONTINUOUS
Status: DISCONTINUED | OUTPATIENT
Start: 2024-08-06 | End: 2024-08-06 | Stop reason: HOSPADM

## 2024-08-06 RX ORDER — EPINEPHRINE 0.3 MG/.3ML
0.3 INJECTION SUBCUTANEOUS PRN
COMMUNITY
Start: 2023-10-27

## 2024-08-06 RX ORDER — PROPOFOL 10 MG/ML
INJECTION, EMULSION INTRAVENOUS PRN
Status: DISCONTINUED | OUTPATIENT
Start: 2024-08-06 | End: 2024-08-06 | Stop reason: SDUPTHER

## 2024-08-06 RX ADMIN — LIDOCAINE HYDROCHLORIDE 50 MG: 10 INJECTION, SOLUTION EPIDURAL; INFILTRATION; INTRACAUDAL; PERINEURAL at 08:13

## 2024-08-06 RX ADMIN — EPHEDRINE SULFATE 10 MG: 50 INJECTION, SOLUTION INTRAVENOUS at 08:22

## 2024-08-06 RX ADMIN — FENTANYL CITRATE 100 MCG: 50 INJECTION, SOLUTION INTRAMUSCULAR; INTRAVENOUS at 08:13

## 2024-08-06 RX ADMIN — EPHEDRINE SULFATE 10 MG: 50 INJECTION, SOLUTION INTRAVENOUS at 08:25

## 2024-08-06 RX ADMIN — EPHEDRINE SULFATE 10 MG: 50 INJECTION, SOLUTION INTRAVENOUS at 08:30

## 2024-08-06 RX ADMIN — PROPOFOL 100 MG: 10 INJECTION, EMULSION INTRAVENOUS at 08:28

## 2024-08-06 RX ADMIN — SODIUM CHLORIDE, SODIUM LACTATE, POTASSIUM CHLORIDE, CALCIUM CHLORIDE: 600; 310; 30; 20 INJECTION, SOLUTION INTRAVENOUS at 07:24

## 2024-08-06 RX ADMIN — PROPOFOL 200 MG: 10 INJECTION, EMULSION INTRAVENOUS at 08:13

## 2024-08-06 ASSESSMENT — PAIN - FUNCTIONAL ASSESSMENT
PAIN_FUNCTIONAL_ASSESSMENT: NONE - DENIES PAIN

## 2024-08-06 NOTE — DISCHARGE INSTRUCTIONS
1.  Await path results, the patient will be contacted in 7-10 days with biopsy results.   2.  Magic mouthwash 5 ml PO Swish and swallow q3h PRN ONLY IF patient has post-procedural sorethroat or chest pain.  3. Full liquids to soft diet today felix discharge from the surgic[facility]; may advance diet starting in AM tomorrow.  4. May resume other meds except any ASA/NSAIDs; may use cough drops or lozenges PRN; also continue meds for GERD with anti-GERD measures.  5. NO ASA/NSAIDs x 2 weeks  6. OP f/u in 8-10 weeks  ; can consider an Esophageal manometry later if the patient's dysphagia persists (this can be arranged to be done in [facility] where patient is being referred for potential Anorectal manometry due to her chronic lower GI issues).     NSAIDS (Non-steroidal Anti-Inflammatory)    You have been directed by your physician to avoid any NSAID's; the following medications are a list of those to avoid. If you think that you are taking any NSAID's notify your physician.     Over The Counter    Advil                      Motrin  Nuprin                   Ibuprofen  Midol                     Aleve  Naproxen              Orudis  Aspirin                   Marcella-Martin    Prescriptions and Generics    Cataflam              Relafen  Voltaren               Clinoril  Indocin                 Naproxen  Arthrotec              Lodine  Daypro                 Nalfon  Toradol                Ansaid  Feldene               Meclofenamate  Fenoprofen          Ponstel  Mobic                   Celebrex        POST-OP ORDERS: ENDOSCOPY :    1. Rest today.    2. DO NOT eat or drink until wide awake; eat your usual diet today in moderate amount only.    3. DO NOT drive today.    4. Call physician if you have severe pain, vomiting, fever, rectal bleeding or black bowel movements.    5.  If a biopsy was taken or a polyp removed, you should expect to hear results in about 7-10 days.  If you have heard nothing from your physician by then, call the office

## 2024-08-06 NOTE — ANESTHESIA POSTPROCEDURE EVALUATION
Department of Anesthesiology  Postprocedure Note    Patient: Roxy Cornelius  MRN: [id number]  YOB: 1965  Date of evaluation: 8/6/2024    Procedure Summary       Date: 08/06/24 Room / Location: [place] [address] / [facility]    Anesthesia Start: 0811 Anesthesia Stop: 0836    Procedures:       ESOPHAGOGASTRODUODENOSCOPY BIOPSY (Esophagus)      ESOPHAGOGASTRODUODENOSCOPY DILATATION WITH 54 FR SAVORY Diagnosis:       Dysphagia, unspecified type      Family hx-GI disorders      Constipation, unspecified constipation type      (Dysphagia, unspecified type [R13.10])      (Family hx-GI disorders [Z83.79])      (Constipation, unspecified constipation type [K59.00])    Surgeons: Rhoda Moffett MD Responsible Provider: Ngoc Zamora APRN - CRNA    Anesthesia Type: General, TIVA ASA Status: 2            Anesthesia Type: General, TIVA    Hayden Phase I:      Hayden Phase II:      Anesthesia Post Evaluation    Patient location during evaluation: bedside  Patient participation: complete - patient participated  Level of consciousness: awake and alert  Pain score: 0  Airway patency: patent  Nausea & Vomiting: no nausea and no vomiting  Cardiovascular status: blood pressure returned to baseline  Respiratory status: acceptable, spontaneous ventilation, room air and nonlabored ventilation  Hydration status: euvolemic  Comments: /65   Pulse 67   Temp 97.8 °F (36.6 °C) (Temporal)   Resp 18   Ht 1.676 m (5' 6\")   Wt 62.6 kg (138 lb)   SpO2 99%   BMI 22.27 kg/m²     Pain management: adequate    No notable events documented.

## 2024-08-06 NOTE — ANESTHESIA PRE PROCEDURE
Department of Anesthesiology  Preprocedure Note       Name:  Roxy Cornelius   Age:  58 y.o.  :  1965                                          MRN:  [id number]         Date:  2024      Surgeon: Surgeon(s):  Rhoda Moffett MD    Procedure: Procedure(s):  ESOPHAGOGASTRODUODENOSCOPY BIOPSY    Medications prior to admission:   Prior to Admission medications    Medication Sig Start Date End Date Taking? Authorizing Provider   EPINEPHrine (EPIPEN) 0.3 MG/0.3ML SOAJ injection Inject 0.3 mLs into the muscle as needed 10/27/23  Yes Elroy Ragland MD   magnesium hydroxide (MILK OF MAGNESIA) 400 MG/5ML suspension Take by mouth daily as needed for Constipation Pill form   Yes Elroy Ragland MD   temazepam (RESTORIL) 30 MG capsule Take 1 capsule by mouth nightly as needed for anxiety or sleep. 6/3/24 9/22/24  Gail Deal MD   desvenlafaxine succinate (PRISTIQ) 50 MG TB24 extended release tablet TAKE ONE TABLET BY MOUTH EVERY DAY 6/3/24   Gail Deal MD   senna (SENOKOT) 8.6 MG tablet Take 1 tablet by mouth daily    Elroy Ragland MD   albuterol sulfate HFA (VENTOLIN HFA) 108 (90 Base) MCG/ACT inhaler Inhale 2 puffs into the lungs 4 times daily as needed for Wheezing 23   Gail Deal MD   Cholecalciferol (VITAMIN D) 2000 units CAPS capsule Take by mouth    Elroy Ragland MD   estradiol (ESTRACE) 1 MG tablet Take 1 tablet by mouth daily 16   Elroy Ragland MD   polyethylene glycol (GLYCOLAX) powder Take 1 g by mouth as needed 16   Elroy Ragland MD       Current medications:    Current Facility-Administered Medications   Medication Dose Route Frequency Provider Last Rate Last Admin   • lactated ringers IV soln infusion   IntraVENous Continuous Rhoda Moffett  mL/hr at 24 0724 New Bag at 24 0724       Allergies:    Allergies   Allergen Reactions   • Iohexol Rash     This is IV Contrast   • Iodinated Contrast Media Rash   •

## 2024-08-06 NOTE — H&P
01/27/2020    Dr Villeda/BRAVO-Kashif, neg for GERD    WISDOM TOOTH EXTRACTION         Social History:  Social History     Tobacco Use    Smoking status: Never    Smokeless tobacco: Never   Vaping Use    Vaping Use: Never used   Substance Use Topics    Alcohol use: Yes     Comment: occ    Drug use: No       Vital Signs:   Vitals:    08/06/24 0711   BP: 115/65   Pulse: 67   Resp: 18   Temp: 97.8 °F (36.6 °C)   SpO2: 99%        Physical Exam:  Cardiac:  [x]WNL  []Comments:  Pulmonary:  [x]WNL   []Comments:  Neuro/Mental Status:  [x]WNL  []Comments:  Abdominal:  [x]WNL    []Comments:  Other:   []WNL  []Comments:    Informed Consent:  The risks and benefits of the procedure have been discussed with either the patient or if they cannot consent, their representative.    Assessment:  Patient examined and appropriate for planned sedation and procedure.     Plan:  Proceed with planned sedation and procedure as above.         Rhoda Moffett MD

## 2024-08-06 NOTE — OP NOTE
Endoscopic Procedure Note    Patient: Roxy Cornelius : 1965  [facility] Rec#: [id number] Acc#: [id number]     Primary Care Provider Gail Deal MD    Endoscopist: Rhoda Moffett MD, MD    Date of Procedure:  2024    Procedure:   EGD with     A Cunningham bougie dilation of esophagus and   cold biopsies    Indications:     Intermittent dysphagia  Family history-eosinophilic esophagitis  History of nonulcer dyspepsia    Anesthesia:  Sedation was administered by anesthesia who monitored the patient during the procedure.    Estimated Blood Loss: minimal    Procedure:   After reviewing the patient's chart and obtaining informed consent, the patient was placed in the left lateral decubitus position.  A forward-viewing Olympus endoscope was lubricated and inserted through the mouth into the oropharynx. Under direct visualization, the upper esophagus was intubated. The scope was advanced to the level of the third portion of duodenum. Scope was slowly withdrawn with careful inspection of the mucosal surfaces. The scope was retroflexed for inspection of the gastric fundus and incisura. Findings and maneuvers are listed in impression below.     Next, a lubricated Cunningham weighted Bougie dilator-54 Fr was gently introduced into the patient's mouth and passed into the Esophagus and into the proximal stomach without much resistance and then withdrawn. Repeat EGD was performed to verify dilation and scope tip was passed into the stomach. NO evidence of perforation or excessive bleeding was noted subsequent to the dilation.        The patient tolerated the procedure well. The scope was removed. There were no immediate complications.    Findings/IMPRESSION:  Esophagus: normal and a normal EG junction at 38 cm.    NO erosions or ulcers or nodules or strictures or webs or rings or mass lesions or extrinsic compression or diverticula noted. An empirical Cunningham 54 fr bougie dilation was performed and random cold

## 2024-08-07 DIAGNOSIS — Z12.31 ENCOUNTER FOR SCREENING MAMMOGRAM FOR MALIGNANT NEOPLASM OF BREAST: Primary | ICD-10-CM

## 2024-08-08 ENCOUNTER — HOSPITAL ENCOUNTER (OUTPATIENT)
Dept: WOMENS IMAGING | Age: 59
Discharge: HOME | End: 2024-08-08
Payer: COMMERCIAL

## 2024-08-08 VITALS — HEIGHT: 66 IN | WEIGHT: 138 LBS | BODY MASS INDEX: 22.18 KG/M2

## 2024-08-08 DIAGNOSIS — Z12.31 ENCOUNTER FOR SCREENING MAMMOGRAM FOR MALIGNANT NEOPLASM OF BREAST: ICD-10-CM

## 2024-08-08 PROCEDURE — 77063 BREAST TOMOSYNTHESIS BI: CPT

## 2024-08-08 ASSESSMENT — ENCOUNTER SYMPTOMS
WHEEZING: 0
CHOKING: 0
ABDOMINAL DISTENTION: 1
BLOOD IN STOOL: 0
EYE REDNESS: 0
NAUSEA: 0
EYE PAIN: 0
DIARRHEA: 0
ABDOMINAL PAIN: 0
VOICE CHANGE: 0
SORE THROAT: 0
COUGH: 0
VOMITING: 0
EYE DISCHARGE: 0
CHEST TIGHTNESS: 0
SHORTNESS OF BREATH: 0

## 2024-08-09 ENCOUNTER — TELEPHONE (OUTPATIENT)
Dept: GASTROENTEROLOGY | Age: 59
End: 2024-08-09

## 2024-08-09 DIAGNOSIS — K59.09 CHRONIC CONSTIPATION: Primary | ICD-10-CM

## 2024-08-09 DIAGNOSIS — R15.9 FECAL INCONTINENCE WITH INCOMPLETE DEFECATION: ICD-10-CM

## 2024-08-09 DIAGNOSIS — R15.0 FECAL INCONTINENCE WITH INCOMPLETE DEFECATION: ICD-10-CM

## 2024-08-09 NOTE — TELEPHONE ENCOUNTER
----- Message from Julianna Christiansen sent at 8/5/2024  3:02 PM CDT -----  Regarding: Checkout note 8/5/24  Demarcus Hathaway/SAMAN for Anorectal Manometry per Dr Lam - That's what it looks like his handwriting says.           8.9.24  Called [facility] at [phone] and spoke with Sarah. Sarah said to just fax over the referral to them. Fax number was [phone].      Fax confirmation scanned in media and attached to this encounter.

## 2024-08-12 ENCOUNTER — PATIENT MESSAGE (OUTPATIENT)
Dept: GASTROENTEROLOGY | Age: 59
End: 2024-08-12

## 2024-08-14 ENCOUNTER — PATIENT MESSAGE (OUTPATIENT)
Dept: GASTROENTEROLOGY | Age: 59
End: 2024-08-14

## 2024-08-14 NOTE — TELEPHONE ENCOUNTER
8.14.24  Called patient to let her know about what Dr Moffett recommends (see TE from 8-12-24). Patient voiced understanding

## 2024-08-14 NOTE — TELEPHONE ENCOUNTER
8.14.24  Called patient to let her know what Dr Moffett recommends. Told patient that it can take up to a week for [facility] to get the referral. Told patient I will call them to see if they got the referral and see what is going on. Patient voiced understanding

## 2024-08-29 ENCOUNTER — OFFICE VISIT (OUTPATIENT)
Dept: INTERNAL MEDICINE | Age: 59
End: 2024-08-29
Payer: COMMERCIAL

## 2024-08-29 VITALS
DIASTOLIC BLOOD PRESSURE: 78 MMHG | HEART RATE: 79 BPM | OXYGEN SATURATION: 97 % | SYSTOLIC BLOOD PRESSURE: 110 MMHG | BODY MASS INDEX: 22.43 KG/M2 | HEIGHT: 66 IN | WEIGHT: 139.6 LBS

## 2024-08-29 DIAGNOSIS — G25.81 RLS (RESTLESS LEGS SYNDROME): ICD-10-CM

## 2024-08-29 DIAGNOSIS — G25.81 RESTLESS LEGS SYNDROME: ICD-10-CM

## 2024-08-29 DIAGNOSIS — F51.01 PRIMARY INSOMNIA: ICD-10-CM

## 2024-08-29 DIAGNOSIS — E03.8 SUBCLINICAL HYPOTHYROIDISM: Primary | ICD-10-CM

## 2024-08-29 DIAGNOSIS — K59.09 CHRONIC CONSTIPATION: ICD-10-CM

## 2024-08-29 PROCEDURE — 99213 OFFICE O/P EST LOW 20 MIN: CPT | Performed by: INTERNAL MEDICINE

## 2024-08-29 RX ORDER — TEMAZEPAM 30 MG
CAPSULE ORAL
Qty: 30 CAPSULE | Refills: 2 | Status: SHIPPED | OUTPATIENT
Start: 2024-08-29 | End: 2024-12-18

## 2024-08-29 ASSESSMENT — ENCOUNTER SYMPTOMS
COUGH: 0
CONSTIPATION: 0
CHEST TIGHTNESS: 0
WHEEZING: 0
ABDOMINAL PAIN: 0
SORE THROAT: 0

## 2024-08-29 NOTE — PROGRESS NOTES
Chief Complaint   Patient presents with    3 Month Follow-Up     History of presenting illness:  Roxy Cornelius is a58 y.o. female who presents today for follow up on her chronic medical conditions   Patient Active Problem List    Diagnosis Date Noted    Acne rosacea 10/05/2022     Priority: Medium    Rash 10/05/2022     Priority: Medium    Exposure to influenza 08/29/2019    Status post dilatation of esophageal stricture 04/22/2019    Primary insomnia 07/17/2018    Osteopenia of multiple sites 07/17/2018     Overview Note:     3/2018  * lumbar spine average T score is -1.45, she does have T score of -2.2 at L4  * Left hip T score -1.8, neck -1.9,troch -2.1  * Right hip total -1.6, neck -1.9,troch -1.8  * Radius total -1.5  5/2020 Lumbar T score -1.7( L4 -2.4)/ hip bilaterally -1.5/ -1.3   6/2022 - hip neck -.2.2/ L spine -2.0  5/2024 L spine lowest -2.0/ hip neck left -1.8; RT -2.0        Orthostatic dizziness 07/17/2018    Generalized anxiety disorder 04/17/2018    Menopause ovarian failure 04/17/2018     Past Medical History:   Diagnosis Date    Allergic rhinitis     Anxiety     Arthritis     Osetopenia    Irritable bowel syndrome     Irritable bowel syndrome (IBS)     Sinus infection       Past Surgical History:   Procedure Laterality Date    BLADDER SURGERY  2013    vaginal tape    COLONOSCOPY  10/26/2015    Dr Nelson Dillard-Tubular AP, active colitis    COLONOSCOPY N/A 02/09/2021    Dr Moffett, Int Hemorrhoids Grade 1, 5 year recall    HYSTERECTOMY (CERVIX STATUS UNKNOWN)      one ovary left    OVARY REMOVAL Right     SINUS SURGERY      TONSILLECTOMY      UPPER GASTROINTESTINAL ENDOSCOPY  10/16/2015    Dr Nelson Dillard-w/balloon dil-18mm- Hiatal hernia, GEJ stricture, erythema in mid to distal esophagus-    UPPER GASTROINTESTINAL ENDOSCOPY  08/23/2017    Dr Nelson Reedw/balloon [id number] hernia, normal path    UPPER GASTROINTESTINAL ENDOSCOPY  03/15/2019    Dr Damon  Only on 06/03/2024   Component Date Value    Color, UA 06/03/2024 YELLOW     Clarity, UA 06/03/2024 Clear     Glucose, Ur 06/03/2024 Negative     Bilirubin, Urine 06/03/2024 Negative     Ketones, Urine 06/03/2024 Negative     Specific Gravity, UA 06/03/2024 1.005     Blood, Urine 06/03/2024 Negative     pH, Urine 06/03/2024 7.0     Protein, UA 06/03/2024 Negative     Urobilinogen, Urine 06/03/2024 0.2     Nitrite, Urine 06/03/2024 Negative     Leukocyte Esterase, Urine 06/03/2024 TRACE (A)     Bacteria, UA 06/03/2024 Negative     Crystals, UA 06/03/2024 NEG     Hyaline Casts, UA 06/03/2024 0     WBC, UA 06/03/2024 1     RBC, UA 06/03/2024 0     Epithelial Cells, UA 06/03/2024 1     Urine Culture, Routine 06/03/2024 <50,000 CFU/ml mixed skin/urogenital katerine. No further workup            ASSESSMENT/PLAN:      INSOMNIA  Ricky reviewed  Ricky was reviewed  On exam today and as per discussions with the patient today there is no evidence of adverse events such as cognitive impairment, sedation, constipation or falls related to prescribed medications. There is also no evidence of aberrant behavior like lost prescriptions or early refill requests or multiple prescribers for controlled substances.     Patient  was advised NOT to attempt to drive a motor vehichle or operate any heavy machinery within 6 hrs of taking the presribed medication - temazepam    -     temazepam (RESTORIL) 30 MG capsule; Take 1 capsule by mouth nightly as needed for anxiety or sleep.      Generalized anxiety disorder-   refill Pristiq 50  -     desvenlafaxine succinate (PRISTIQ) 50 MG TB24 extended release tablet; TAKE ONE TABLET BY MOUTH EVERY DAY     Subclinical hypothyroidism  Elevated TSH 4.7, ft4 0.97  Repeat 12/2024       Chronic constipation  Suggest appt with gastro for opinion- has referral to [facility]    Post esophageal dilatation 8/6/2024 dr Lam  Notes reviewed       Patient has occasionally problems with restless leg  Pain in

## 2024-09-28 ENCOUNTER — OFFICE VISIT (OUTPATIENT)
Age: 59
End: 2024-09-28

## 2024-09-28 VITALS
RESPIRATION RATE: 20 BRPM | SYSTOLIC BLOOD PRESSURE: 118 MMHG | WEIGHT: 140 LBS | HEART RATE: 100 BPM | OXYGEN SATURATION: 96 % | BODY MASS INDEX: 22.6 KG/M2 | DIASTOLIC BLOOD PRESSURE: 70 MMHG | TEMPERATURE: 98.6 F

## 2024-09-28 DIAGNOSIS — R11.0 NAUSEA: ICD-10-CM

## 2024-09-28 DIAGNOSIS — R53.83 OTHER FATIGUE: ICD-10-CM

## 2024-09-28 DIAGNOSIS — R05.1 ACUTE COUGH: ICD-10-CM

## 2024-09-28 DIAGNOSIS — J02.9 SORE THROAT: ICD-10-CM

## 2024-09-28 DIAGNOSIS — R09.81 SINUS CONGESTION: ICD-10-CM

## 2024-09-28 DIAGNOSIS — J01.90 ACUTE RHINOSINUSITIS: Primary | ICD-10-CM

## 2024-09-28 LAB
INFLUENZA A ANTIBODY: NEGATIVE
INFLUENZA B ANTIBODY: NEGATIVE
Lab: NORMAL
QC PASS/FAIL: NORMAL
S PYO AG THROAT QL: NORMAL
SARS-COV-2, POC: NORMAL

## 2024-09-28 RX ORDER — ONDANSETRON 4 MG/1
4 TABLET, ORALLY DISINTEGRATING ORAL 3 TIMES DAILY PRN
Qty: 21 TABLET | Refills: 0 | Status: SHIPPED | OUTPATIENT
Start: 2024-09-28

## 2024-09-28 RX ORDER — BENZONATATE 100 MG/1
100-200 CAPSULE ORAL 3 TIMES DAILY PRN
Qty: 30 CAPSULE | Refills: 0 | Status: SHIPPED | OUTPATIENT
Start: 2024-09-28 | End: 2024-10-05

## 2024-09-28 RX ORDER — AMOXICILLIN AND CLAVULANATE POTASSIUM 500; 125 MG/1; MG/1
1 TABLET, FILM COATED ORAL 2 TIMES DAILY WITH MEALS
Qty: 14 TABLET | Refills: 0 | Status: SHIPPED | OUTPATIENT
Start: 2024-09-28 | End: 2024-10-05

## 2024-09-28 ASSESSMENT — ENCOUNTER SYMPTOMS
CONSTIPATION: 0
ABDOMINAL PAIN: 0
CHEST TIGHTNESS: 0
VOMITING: 0
APNEA: 0
NAUSEA: 1
TROUBLE SWALLOWING: 0
SINUS PAIN: 0
STRIDOR: 0
WHEEZING: 0
SHORTNESS OF BREATH: 0
EYE PAIN: 0
SORE THROAT: 1
DIARRHEA: 0
EYE REDNESS: 0
FACIAL SWELLING: 0
SINUS PRESSURE: 1
CHOKING: 0
COLOR CHANGE: 0
EYE DISCHARGE: 0
ABDOMINAL DISTENTION: 0
COUGH: 1

## 2024-09-30 ENCOUNTER — OFFICE VISIT (OUTPATIENT)
Dept: GASTROENTEROLOGY | Age: 59
End: 2024-09-30
Payer: COMMERCIAL

## 2024-09-30 VITALS
HEART RATE: 86 BPM | WEIGHT: 140 LBS | DIASTOLIC BLOOD PRESSURE: 80 MMHG | OXYGEN SATURATION: 99 % | SYSTOLIC BLOOD PRESSURE: 130 MMHG | BODY MASS INDEX: 22.5 KG/M2 | HEIGHT: 66 IN

## 2024-09-30 DIAGNOSIS — R15.0 FECAL INCONTINENCE WITH INCOMPLETE DEFECATION: ICD-10-CM

## 2024-09-30 DIAGNOSIS — R15.9 FECAL INCONTINENCE WITH INCOMPLETE DEFECATION: ICD-10-CM

## 2024-09-30 DIAGNOSIS — R13.19 INTERMITTENT DYSPHAGIA: Primary | ICD-10-CM

## 2024-09-30 DIAGNOSIS — K59.09 CHRONIC CONSTIPATION: ICD-10-CM

## 2024-09-30 DIAGNOSIS — K21.9 CHRONIC GERD: ICD-10-CM

## 2024-09-30 DIAGNOSIS — K58.1 IRRITABLE BOWEL SYNDROME WITH CONSTIPATION: ICD-10-CM

## 2024-09-30 PROCEDURE — 99213 OFFICE O/P EST LOW 20 MIN: CPT | Performed by: INTERNAL MEDICINE

## 2024-09-30 ASSESSMENT — ENCOUNTER SYMPTOMS
COUGH: 1
CONSTIPATION: 1
EYES NEGATIVE: 1
ALLERGIC/IMMUNOLOGIC NEGATIVE: 1

## 2024-09-30 NOTE — PROGRESS NOTES
Extraocular Movements: Extraocular movements intact.      Conjunctiva/sclera: Conjunctivae normal.      Pupils: Pupils are equal, round, and reactive to light.   Neck:      Vascular: No carotid bruit.   Cardiovascular:      Rate and Rhythm: Normal rate and regular rhythm.      Pulses: Normal pulses.      Heart sounds: Normal heart sounds. No murmur heard.     No friction rub.   Pulmonary:      Effort: Pulmonary effort is normal.      Breath sounds: Normal breath sounds. No wheezing or rhonchi.   Abdominal:      General: Abdomen is flat. Bowel sounds are normal. There is no distension.      Palpations: Abdomen is soft. There is no hepatomegaly, splenomegaly or mass.      Tenderness: There is no right CVA tenderness or left CVA tenderness.      Hernia: No hernia is present.   Musculoskeletal:         General: No swelling. Normal range of motion.      Cervical back: Normal range of motion and neck supple. No muscular tenderness.      Right lower leg: No edema.      Left lower leg: No edema.   Lymphadenopathy:      Cervical: No cervical adenopathy.   Skin:     General: Skin is warm.      Capillary Refill: Capillary refill takes less than 2 seconds.      Coloration: Skin is not jaundiced.      Findings: No bruising or rash.   Neurological:      Mental Status: She is alert and oriented to person, place, and time.      Cranial Nerves: No cranial nerve deficit.      Coordination: Coordination normal.      Gait: Gait normal.   Psychiatric:         Mood and Affect: Mood normal.         Behavior: Behavior normal.         Thought Content: Thought content normal.         Judgment: Judgment normal.         Labs:  Lab Results   Component Value Date    WBC 5.4 05/30/2024    HGB 14.0 05/30/2024    HCT 44.1 05/30/2024    MCV 95.0 05/30/2024     05/30/2024      Lab Results   Component Value Date     05/30/2024    K 4.7 05/30/2024     05/30/2024    CO2 30 (H) 05/30/2024    BUN 13 05/30/2024    CREATININE 0.7

## 2024-10-14 ENCOUNTER — OFFICE VISIT (OUTPATIENT)
Age: 59
End: 2024-10-14
Payer: COMMERCIAL

## 2024-10-14 VITALS
OXYGEN SATURATION: 96 % | DIASTOLIC BLOOD PRESSURE: 62 MMHG | RESPIRATION RATE: 20 BRPM | HEART RATE: 88 BPM | BODY MASS INDEX: 22.5 KG/M2 | TEMPERATURE: 97.7 F | SYSTOLIC BLOOD PRESSURE: 126 MMHG | WEIGHT: 140 LBS | HEIGHT: 66 IN

## 2024-10-14 DIAGNOSIS — R05.1 ACUTE COUGH: ICD-10-CM

## 2024-10-14 DIAGNOSIS — J01.90 ACUTE RHINOSINUSITIS: Primary | ICD-10-CM

## 2024-10-14 DIAGNOSIS — R09.81 SINUS CONGESTION: ICD-10-CM

## 2024-10-14 PROCEDURE — 96372 THER/PROPH/DIAG INJ SC/IM: CPT

## 2024-10-14 PROCEDURE — 99213 OFFICE O/P EST LOW 20 MIN: CPT

## 2024-10-14 RX ORDER — CEFDINIR 300 MG/1
300 CAPSULE ORAL 2 TIMES DAILY
Qty: 14 CAPSULE | Refills: 0 | Status: SHIPPED | OUTPATIENT
Start: 2024-10-14 | End: 2024-10-21

## 2024-10-14 RX ORDER — DEXAMETHASONE SODIUM PHOSPHATE 10 MG/ML
8 INJECTION INTRAMUSCULAR; INTRAVENOUS ONCE
Status: COMPLETED | OUTPATIENT
Start: 2024-10-14 | End: 2024-10-14

## 2024-10-14 RX ADMIN — DEXAMETHASONE SODIUM PHOSPHATE 8 MG: 10 INJECTION INTRAMUSCULAR; INTRAVENOUS at 10:42

## 2024-10-14 ASSESSMENT — ENCOUNTER SYMPTOMS
FACIAL SWELLING: 0
NAUSEA: 0
DIARRHEA: 0
CHEST TIGHTNESS: 0
APNEA: 0
VOMITING: 0
SORE THROAT: 0
STRIDOR: 0
SINUS PAIN: 0
SHORTNESS OF BREATH: 0
COUGH: 1
COLOR CHANGE: 0
SINUS PRESSURE: 1
WHEEZING: 0
EYE REDNESS: 0
EYE PAIN: 0
CONSTIPATION: 0
TROUBLE SWALLOWING: 0
ABDOMINAL PAIN: 0
EYE DISCHARGE: 0
ABDOMINAL DISTENTION: 0
CHOKING: 0

## 2024-10-14 NOTE — PROGRESS NOTES
Medication was administered by Tim Umanzor MA at 10:45 AM.    Medication: dexamethasone  Amount: 8mg  Route: intramuscular  Site: Dorsogluteal right    Patient tolerated well.  
Refill: Capillary refill takes less than 2 seconds.      Coloration: Skin is not jaundiced or pale.      Findings: No erythema or rash.   Neurological:      General: No focal deficit present.      Mental Status: She is alert and oriented to person, place, and time.      Deep Tendon Reflexes: Reflexes are normal and symmetric.   Psychiatric:         Attention and Perception: Attention normal.         Mood and Affect: Mood normal.         Behavior: Behavior normal. Behavior is cooperative.         Thought Content: Thought content normal.         /62   Pulse 88   Temp 97.7 °F (36.5 °C) (Temporal)   Resp 20   Ht 1.676 m (5' 6\")   Wt 63.5 kg (140 lb)   SpO2 96%   BMI 22.60 kg/m²     Assessment   Assessment & Plan      Diagnosis Orders   1. Acute rhinosinusitis  dexAMETHasone (DECADRON) injection 8 mg    cefdinir (OMNICEF) 300 MG capsule      2. Sinus congestion        3. Acute cough            Plan   Dexamethasone 8 mg injection given today in office    Cefdinir prescribed. Take full course of antibiotics    Increase fluid intake    Recommended OTC mucinex     May use OTC claritin/zyrtec and nasal spray such as flonase to help symptoms    If patient is not improving or developing any new/worsening symptoms then return to clinic or f/u with PCP    Any condition can change, despite proper treatment. Therefore, if symptoms still persist or worsen after treatment plan intitated today, either go to the nearest ER, or call PCP, or return to UC for further evaluation.    Urgent Care evaluation today is not a substitute for PCP visit. Follow up care is your responsibility to discuss and review this UC visit.    Discussed use, benefits, and side effects of any prescribed medications. All patient questions were answered. Patient demonstrates understanding and agrees with care plan. Patient was given educational materials - see patient instructions below     No orders of the defined types were placed in this

## 2024-10-14 NOTE — PATIENT INSTRUCTIONS
Dexamethasone 8 mg injection given today in office    Cefdinir prescribed. Take full course of antibiotics    Increase fluid intake    Recommended OTC mucinex     May use OTC claritin/zyrtec and nasal spray such as flonase to help symptoms    If patient is not improving or developing any new/worsening symptoms then return to clinic or f/u with PCP    Any condition can change, despite proper treatment. Therefore, if symptoms still persist or worsen after treatment plan intitated today, either go to the nearest ER, or call PCP, or return to UC for further evaluation.    Urgent Care evaluation today is not a substitute for PCP visit. Follow up care is your responsibility to discuss and review this UC visit.

## 2024-11-18 DIAGNOSIS — E03.8 SUBCLINICAL HYPOTHYROIDISM: ICD-10-CM

## 2024-11-18 LAB
T4 FREE SERPL-MCNC: 0.96 NG/DL (ref 0.93–1.7)
TSH SERPL DL<=0.005 MIU/L-ACNC: 2.21 UIU/ML (ref 0.27–4.2)

## 2024-11-22 ENCOUNTER — OFFICE VISIT (OUTPATIENT)
Dept: INTERNAL MEDICINE | Age: 59
End: 2024-11-22

## 2024-11-22 VITALS
OXYGEN SATURATION: 99 % | SYSTOLIC BLOOD PRESSURE: 100 MMHG | HEART RATE: 81 BPM | WEIGHT: 139.6 LBS | BODY MASS INDEX: 22.43 KG/M2 | HEIGHT: 66 IN | DIASTOLIC BLOOD PRESSURE: 62 MMHG

## 2024-11-22 DIAGNOSIS — K59.09 CHRONIC CONSTIPATION: ICD-10-CM

## 2024-11-22 DIAGNOSIS — E03.8 SUBCLINICAL HYPOTHYROIDISM: ICD-10-CM

## 2024-11-22 DIAGNOSIS — F41.1 GENERALIZED ANXIETY DISORDER: ICD-10-CM

## 2024-11-22 DIAGNOSIS — F51.01 PRIMARY INSOMNIA: Primary | ICD-10-CM

## 2024-11-22 DIAGNOSIS — Z23 NEED FOR VACCINATION: ICD-10-CM

## 2024-11-22 RX ORDER — DESVENLAFAXINE 50 MG/1
TABLET, FILM COATED, EXTENDED RELEASE ORAL
Qty: 30 TABLET | Refills: 5 | Status: SHIPPED | OUTPATIENT
Start: 2024-11-22

## 2024-11-22 RX ORDER — TEMAZEPAM 30 MG/1
CAPSULE ORAL
Qty: 30 CAPSULE | Refills: 2 | Status: SHIPPED | OUTPATIENT
Start: 2024-11-22 | End: 2025-03-13

## 2024-11-22 RX ORDER — DESVENLAFAXINE 25 MG/1
25 TABLET, EXTENDED RELEASE ORAL DAILY
Qty: 30 TABLET | Refills: 1 | Status: SHIPPED | OUTPATIENT
Start: 2024-11-22

## 2024-11-22 SDOH — ECONOMIC STABILITY: FOOD INSECURITY: WITHIN THE PAST 12 MONTHS, THE FOOD YOU BOUGHT JUST DIDN'T LAST AND YOU DIDN'T HAVE MONEY TO GET MORE.: NEVER TRUE

## 2024-11-22 SDOH — ECONOMIC STABILITY: FOOD INSECURITY: WITHIN THE PAST 12 MONTHS, YOU WORRIED THAT YOUR FOOD WOULD RUN OUT BEFORE YOU GOT MONEY TO BUY MORE.: NEVER TRUE

## 2024-11-22 SDOH — ECONOMIC STABILITY: INCOME INSECURITY: HOW HARD IS IT FOR YOU TO PAY FOR THE VERY BASICS LIKE FOOD, HOUSING, MEDICAL CARE, AND HEATING?: NOT HARD AT ALL

## 2024-11-22 ASSESSMENT — ENCOUNTER SYMPTOMS
WHEEZING: 0
COUGH: 0
CONSTIPATION: 0
CHEST TIGHTNESS: 0
SORE THROAT: 0
ABDOMINAL PAIN: 0

## 2024-11-22 ASSESSMENT — PATIENT HEALTH QUESTIONNAIRE - PHQ9
2. FEELING DOWN, DEPRESSED OR HOPELESS: NOT AT ALL
SUM OF ALL RESPONSES TO PHQ QUESTIONS 1-9: 0
1. LITTLE INTEREST OR PLEASURE IN DOING THINGS: NOT AT ALL
SUM OF ALL RESPONSES TO PHQ QUESTIONS 1-9: 0
SUM OF ALL RESPONSES TO PHQ9 QUESTIONS 1 & 2: 0

## 2024-11-22 NOTE — PROGRESS NOTES
Chief Complaint   Patient presents with    3 Month Follow-Up     History of presenting illness:  Roxy Cornelius is a59 y.o. female who presents today for follow up on her chronic medical conditions as noted below.      Patient Active Problem List    Diagnosis Date Noted    Acne rosacea 10/05/2022     Priority: Medium    Rash 10/05/2022     Priority: Medium    Exposure to influenza 08/29/2019    Status post dilatation of esophageal stricture 04/22/2019    Primary insomnia 07/17/2018    Osteopenia of multiple sites 07/17/2018     Overview Note:     3/2018  * lumbar spine average T score is -1.45, she does have T score of -2.2 at L4  * Left hip T score -1.8, neck -1.9,troch -2.1  * Right hip total -1.6, neck -1.9,troch -1.8  * Radius total -1.5  5/2020 Lumbar T score -1.7( L4 -2.4)/ hip bilaterally -1.5/ -1.3   6/2022 - hip neck -.2.2/ L spine -2.0  5/2024 L spine lowest -2.0/ hip neck left -1.8; RT -2.0        Orthostatic dizziness 07/17/2018    Generalized anxiety disorder 04/17/2018    Menopause ovarian failure 04/17/2018     Past Medical History:   Diagnosis Date    Allergic rhinitis     Anxiety     Arthritis     Osetopenia    Irritable bowel syndrome     Irritable bowel syndrome (IBS)     Sinus infection       Past Surgical History:   Procedure Laterality Date    BLADDER SURGERY  2013    vaginal tape    COLONOSCOPY  10/26/2015    Dr Nelson Dillard-Tubular AP, active colitis    COLONOSCOPY N/A 02/09/2021    Dr Moffett, Int Hemorrhoids Grade 1, 5 year recall    HYSTERECTOMY (CERVIX STATUS UNKNOWN)      one ovary left    OVARY REMOVAL Right     SINUS SURGERY      TONSILLECTOMY      UPPER GASTROINTESTINAL ENDOSCOPY  10/16/2015    Dr Nelson Dillard-w/balloon dil-18mm- Hiatal hernia, GEJ stricture, erythema in mid to distal esophagus-    UPPER GASTROINTESTINAL ENDOSCOPY  08/23/2017    Dr Nelson Reedw/balloon [id number] hernia, normal path    UPPER GASTROINTESTINAL ENDOSCOPY

## 2024-12-11 ENCOUNTER — OFFICE VISIT (OUTPATIENT)
Age: 59
End: 2024-12-11
Payer: COMMERCIAL

## 2024-12-11 VITALS — HEIGHT: 66 IN | BODY MASS INDEX: 22.5 KG/M2 | WEIGHT: 140 LBS

## 2024-12-11 DIAGNOSIS — M67.02 CONTRACTURE OF BOTH ACHILLES TENDONS: Primary | ICD-10-CM

## 2024-12-11 DIAGNOSIS — M77.41 METATARSALGIA OF BOTH FEET: ICD-10-CM

## 2024-12-11 DIAGNOSIS — M77.42 METATARSALGIA OF BOTH FEET: ICD-10-CM

## 2024-12-11 DIAGNOSIS — M79.671 RIGHT FOOT PAIN: ICD-10-CM

## 2024-12-11 DIAGNOSIS — M67.01 CONTRACTURE OF BOTH ACHILLES TENDONS: Primary | ICD-10-CM

## 2024-12-11 PROCEDURE — 99203 OFFICE O/P NEW LOW 30 MIN: CPT | Performed by: PODIATRIST

## 2024-12-11 NOTE — PATIENT INSTRUCTIONS
Patient Education        Plantar Fasciitis: Exercises  Introduction  Here are some examples of exercises for you to try. The exercises may be suggested for a condition or for rehabilitation. Start each exercise slowly. Ease off the exercises if you start to have pain.  You will be told when to start these exercises and which ones will work best for you.  How to do the exercises  Calf stretch (seated, knee straight)    Sit on the floor with your affected leg straight and resting on the floor.  Place a towel around your affected foot.  Hold one end of the towel in each hand.  Pull back gently with the towel so that you feel a stretch in your calf.  Hold the position for 15 to 30 seconds.  Repeat 2 to 4 times.  It's a good idea to repeat these steps with your other leg.  Calf stretch (back knee straight)    Stand facing a wall with your hands on the wall. You can also do this with your hands on the back of a chair, a counter, or a tree.  Put one leg about a step behind your other leg, with your toes pointing forward.  Keeping your back leg straight and your back heel on the floor, bend your front knee and gently bring your hip and chest toward the wall until you feel a stretch in the calf of your back leg.  Hold the stretch for 15 to 30 seconds.  Repeat 2 to 4 times for each leg.  Calf stretch on a step    Stand on the bottom step of a staircase, facing up toward the stairs. Put the balls of your feet on the step. Hold on to the handrail or wall.  Slowly let your heels down over the edge of the step as you relax your calf muscles. You should feel a gentle stretch up the back of your leg to your knee.  Hold the stretch about 15 to 30 seconds, and then tighten your calf muscle a little to bring your heel back up to the level of the step.  Repeat 2 to 4 times.  Towel scrunch    Sit in a chair, and place your affected foot on a towel on a hard floor (not a floor with carpet).  Scrunch the towel toward you with your toes.

## 2024-12-11 NOTE — PROGRESS NOTES
Cholecalciferol (VITAMIN D) 2000 units CAPS capsule Take by mouth      estradiol (ESTRACE) 1 MG tablet Take 1 tablet by mouth daily      desvenlafaxine succinate (PRISTIQ) 25 MG TB24 extended release tablet Take 1 tablet by mouth daily (Patient not taking: Reported on 12/11/2024) 30 tablet 1    ondansetron (ZOFRAN-ODT) 4 MG disintegrating tablet Take 1 tablet by mouth 3 times daily as needed for Nausea or Vomiting (Patient not taking: Reported on 12/11/2024) 21 tablet 0    polyethylene glycol (GLYCOLAX) powder Take 1 g by mouth as needed (Patient not taking: Reported on 12/11/2024)       No current facility-administered medications for this visit.        Allergies  Allergies   Allergen Reactions    Iohexol Rash     This is IV Contrast    Iodinated Contrast Media Rash    Sulfa Antibiotics Rash        Ht 1.676 m (5' 6\")   Wt 63.5 kg (140 lb)   BMI 22.60 kg/m²      Physical Exam   Physical Examination:  General: The patient is a Well Nourished 59 y.o. female who is alert and oriented x3 in no distress. The patient is cooperative during the exam.  Psychological: Is a pleasant female, good mood and affect, answers questions appropriately.  Head and Neck: Normocephalic, Atraumatic. Neck supple, no evidence of masses.   Abdomen: Soft, nontender, nondistended.  Eyes: Perrla. Extraocular movements intact.   Respiratory: Rate and effort within normal limits.   Vascular: Palpable dorsalis pedis and posterior tibial pulse bilaterally.  Neurological: Adequate sharp and dull sensation bilateral lower extremities.  Dermatological: Skin is cool with dry and supple with no open lesions bilaterally.  Musculoskeletal: She does have a decrease in medial longitudinal arch with weightbearing stance.  That is completely reducible in nonweightbearing position.  With her foot reduced and her knee extended she does have limitation of ankle joint dorsiflexion bilaterally and equally.  Gait Examination: She is pronated throughout the gait

## 2025-01-28 ENCOUNTER — OFFICE VISIT (OUTPATIENT)
Dept: INTERNAL MEDICINE | Age: 60
End: 2025-01-28
Payer: COMMERCIAL

## 2025-01-28 VITALS
BODY MASS INDEX: 22.6 KG/M2 | HEIGHT: 66 IN | DIASTOLIC BLOOD PRESSURE: 68 MMHG | OXYGEN SATURATION: 98 % | SYSTOLIC BLOOD PRESSURE: 118 MMHG | TEMPERATURE: 98.1 F | HEART RATE: 88 BPM | WEIGHT: 140.6 LBS

## 2025-01-28 DIAGNOSIS — J01.00 ACUTE NON-RECURRENT MAXILLARY SINUSITIS: Primary | ICD-10-CM

## 2025-01-28 DIAGNOSIS — R09.82 POSTNASAL DRIP: ICD-10-CM

## 2025-01-28 DIAGNOSIS — F51.01 PRIMARY INSOMNIA: ICD-10-CM

## 2025-01-28 DIAGNOSIS — Z00.00 ANNUAL PHYSICAL EXAM: ICD-10-CM

## 2025-01-28 DIAGNOSIS — E03.8 SUBCLINICAL HYPOTHYROIDISM: ICD-10-CM

## 2025-01-28 DIAGNOSIS — E78.00 PURE HYPERCHOLESTEROLEMIA: ICD-10-CM

## 2025-01-28 DIAGNOSIS — R44.8 FACIAL PRESSURE: ICD-10-CM

## 2025-01-28 DIAGNOSIS — F41.1 GENERALIZED ANXIETY DISORDER: ICD-10-CM

## 2025-01-28 DIAGNOSIS — E55.9 VITAMIN D DEFICIENCY: ICD-10-CM

## 2025-01-28 PROCEDURE — 99214 OFFICE O/P EST MOD 30 MIN: CPT | Performed by: INTERNAL MEDICINE

## 2025-01-28 RX ORDER — CEFDINIR 300 MG/1
300 CAPSULE ORAL 2 TIMES DAILY
Qty: 20 CAPSULE | Refills: 0 | Status: SHIPPED | OUTPATIENT
Start: 2025-01-28 | End: 2025-02-07

## 2025-01-28 RX ORDER — PREDNISONE 10 MG/1
10 TABLET ORAL 2 TIMES DAILY
Qty: 10 TABLET | Refills: 0 | Status: SHIPPED | OUTPATIENT
Start: 2025-01-28 | End: 2025-02-02

## 2025-01-28 RX ORDER — TEMAZEPAM 30 MG/1
CAPSULE ORAL
Qty: 30 CAPSULE | Refills: 2 | Status: SHIPPED | OUTPATIENT
Start: 2025-01-28 | End: 2025-05-19

## 2025-01-28 SDOH — ECONOMIC STABILITY: FOOD INSECURITY: WITHIN THE PAST 12 MONTHS, THE FOOD YOU BOUGHT JUST DIDN'T LAST AND YOU DIDN'T HAVE MONEY TO GET MORE.: NEVER TRUE

## 2025-01-28 SDOH — ECONOMIC STABILITY: FOOD INSECURITY: WITHIN THE PAST 12 MONTHS, YOU WORRIED THAT YOUR FOOD WOULD RUN OUT BEFORE YOU GOT MONEY TO BUY MORE.: NEVER TRUE

## 2025-01-28 ASSESSMENT — ENCOUNTER SYMPTOMS
COUGH: 0
CHEST TIGHTNESS: 0
SORE THROAT: 0
RHINORRHEA: 1
WHEEZING: 0
CONSTIPATION: 0
SINUS PAIN: 1
ABDOMINAL PAIN: 0
SINUS PRESSURE: 1

## 2025-01-28 ASSESSMENT — PATIENT HEALTH QUESTIONNAIRE - PHQ9
SUM OF ALL RESPONSES TO PHQ QUESTIONS 1-9: 0
2. FEELING DOWN, DEPRESSED OR HOPELESS: NOT AT ALL
SUM OF ALL RESPONSES TO PHQ QUESTIONS 1-9: 0
SUM OF ALL RESPONSES TO PHQ9 QUESTIONS 1 & 2: 0
1. LITTLE INTEREST OR PLEASURE IN DOING THINGS: NOT AT ALL

## 2025-01-28 NOTE — PROGRESS NOTES
Chief Complaint   Patient presents with    Congestion     Head congestion times 10 days     Ear Pain     History of presenting illness:  Roxy Cornelius is a59 y.o. female who presents today for follow up on her chronic medical conditions as noted below.    Patient Active Problem List    Diagnosis Date Noted    Acne rosacea 10/05/2022     Priority: Medium    Rash 10/05/2022     Priority: Medium    Exposure to influenza 08/29/2019    Status post dilatation of esophageal stricture 04/22/2019    Primary insomnia 07/17/2018    Osteopenia of multiple sites 07/17/2018     Overview Note:     3/2018  * lumbar spine average T score is -1.45, she does have T score of -2.2 at L4  * Left hip T score -1.8, neck -1.9,troch -2.1  * Right hip total -1.6, neck -1.9,troch -1.8  * Radius total -1.5  5/2020 Lumbar T score -1.7( L4 -2.4)/ hip bilaterally -1.5/ -1.3   6/2022 - hip neck -.2.2/ L spine -2.0  5/2024 L spine lowest -2.0/ hip neck left -1.8; RT -2.0        Orthostatic dizziness 07/17/2018    Generalized anxiety disorder 04/17/2018    Menopause ovarian failure 04/17/2018     Past Medical History:   Diagnosis Date    Allergic rhinitis     Anxiety     Arthritis     Osetopenia    Coles's cyst 3/2023    Carpal tunnel syndrome     Irritable bowel syndrome     Irritable bowel syndrome (IBS)     Sinus infection       Past Surgical History:   Procedure Laterality Date    ABDOMEN SURGERY  8/2009    Hysterectomy    BLADDER SURGERY  2013    vaginal tape    CARPAL TUNNEL RELEASE  10/2023    COLONOSCOPY  10/26/2015    Dr Nelson Dillard-Tubular AP, active colitis    COLONOSCOPY N/A 02/09/2021    Dr Moffett, Int Hemorrhoids Grade 1, 5 year recall    ELBOW SURGERY  10/2023    Cubital tunnel    HYSTERECTOMY (CERVIX STATUS UNKNOWN)      one ovary left    OVARY REMOVAL Right     SINUS SURGERY      TONSILLECTOMY      UPPER GASTROINTESTINAL ENDOSCOPY  10/16/2015    Dr Nelson Dillard-w/balloon dil-18mm- Hiatal

## 2025-03-20 RX ORDER — DESVENLAFAXINE 25 MG/1
25 TABLET, EXTENDED RELEASE ORAL DAILY
Qty: 30 TABLET | Refills: 5 | Status: SHIPPED | OUTPATIENT
Start: 2025-03-20

## 2025-03-20 NOTE — TELEPHONE ENCOUNTER
Roxy Cornelius called to request a refill on her medication.      Last office visit : 1/28/2025   Next office visit : 5/7/2025     Requested Prescriptions     Pending Prescriptions Disp Refills    desvenlafaxine succinate (PRISTIQ) 25 MG TB24 extended release tablet 30 tablet 5     Sig: Take 1 tablet by mouth daily     Patient Comment: I have been taking the 25 mg rather than 50 mg and am doing well. I have about a week’s worth left and would like to continue on this mg. Thanks! Roxy Perez MA

## 2025-03-25 NOTE — PROGRESS NOTES
Orthopaedic Clinic Note - Established Patient    NAME:  Roxy Cornelius   : 1965  MRN: [id number]    3/26/2025     CHIEF COMPLAINT:  follow up Right knee      HISTORY OF PRESENT ILLNESS:   Roxy is a 59 y.o. female who presents to the office for repeat evaluation and treatment of the right knee.   She began developing pain approximately 6 weeks ago while riding her [employer] bike.  Her pain is located on the medial aspect of her patella.  She has been working out with [place] downstairs trying to get in shape for a trip that she is taking with her  this fall to [place].  Her pain is not improved with conservative treatment.    Past Medical History:        Diagnosis Date    Allergic rhinitis     Anxiety     Arthritis     Osetopenia    Coles's cyst 3/2023    Carpal tunnel syndrome     Irritable bowel syndrome     Irritable bowel syndrome (IBS)     Sinus infection        Past Surgical History:        Procedure Laterality Date    ABDOMEN SURGERY  2009    Hysterectomy    BLADDER SURGERY      vaginal tape    CARPAL TUNNEL RELEASE  10/2023    COLONOSCOPY  10/26/2015    Dr Nelson Dillard-Tubular AP, active colitis    COLONOSCOPY N/A 2021    Dr Moffett, Int Hemorrhoids Grade 1, 5 year recall    ELBOW SURGERY  10/2023    Cubital tunnel    HYSTERECTOMY (CERVIX STATUS UNKNOWN)      one ovary left    OVARY REMOVAL Right     SINUS SURGERY      TONSILLECTOMY      UPPER GASTROINTESTINAL ENDOSCOPY  10/16/2015    Dr Nelson Dillard-w/balloon dil-18mm- Hiatal hernia, GEJ stricture, erythema in mid to distal esophagus-    UPPER GASTROINTESTINAL ENDOSCOPY  2017    Dr Nelson Pace/balloon [id number] hernia, normal path    UPPER GASTROINTESTINAL ENDOSCOPY  03/15/2019    Dr Nelson Rauschw/balloon [id number] hernia, GEJ stricture, reflux    UPPER GASTROINTESTINAL ENDOSCOPY N/A 2020    Dr Villeda/BRAVO-Benign, neg for GERD    UPPER

## 2025-03-26 ENCOUNTER — OFFICE VISIT (OUTPATIENT)
Age: 60
End: 2025-03-26
Payer: COMMERCIAL

## 2025-03-26 VITALS — HEIGHT: 66 IN | WEIGHT: 140.4 LBS | BODY MASS INDEX: 22.56 KG/M2

## 2025-03-26 DIAGNOSIS — M22.41 CHONDROMALACIA, PATELLA, RIGHT: Primary | ICD-10-CM

## 2025-03-26 PROCEDURE — 99213 OFFICE O/P EST LOW 20 MIN: CPT | Performed by: ORTHOPAEDIC SURGERY

## 2025-04-21 ENCOUNTER — OFFICE VISIT (OUTPATIENT)
Age: 60
End: 2025-04-21
Payer: COMMERCIAL

## 2025-04-21 VITALS — WEIGHT: 140 LBS | HEIGHT: 66 IN | BODY MASS INDEX: 22.5 KG/M2

## 2025-04-21 DIAGNOSIS — M77.42 METATARSALGIA OF BOTH FEET: ICD-10-CM

## 2025-04-21 DIAGNOSIS — M77.41 METATARSALGIA OF BOTH FEET: ICD-10-CM

## 2025-04-21 DIAGNOSIS — M67.01 CONTRACTURE OF BOTH ACHILLES TENDONS: ICD-10-CM

## 2025-04-21 DIAGNOSIS — M67.02 CONTRACTURE OF BOTH ACHILLES TENDONS: ICD-10-CM

## 2025-04-21 DIAGNOSIS — G57.61 NEUROMA OF SECOND INTERSPACE OF RIGHT FOOT: Primary | ICD-10-CM

## 2025-04-21 PROCEDURE — 99213 OFFICE O/P EST LOW 20 MIN: CPT | Performed by: PODIATRIST

## 2025-04-21 RX ORDER — MELOXICAM 15 MG/1
15 TABLET ORAL DAILY
Qty: 30 TABLET | Refills: 1 | Status: SHIPPED | OUTPATIENT
Start: 2025-04-21

## 2025-04-21 NOTE — PATIENT INSTRUCTIONS
This is the night splint I recommend.  It can be found on [employer].  Do not use the included wedge with this until you can tolerate the night splint all night long.  It is ok if you can only tolerate it for a few hours initially. Just remove if it becomes uncomfortable.  Hopefully with time you will be able to wear it for longer periods of time.

## 2025-04-21 NOTE — PROGRESS NOTES
[facility]  [facility]  [address]  [facility] [id number] [phone]     Patient: Roxy Cornelius   YOB: 1965   Date: 4/21/2025   Visit Type:  Follow up-- right foot pain    Body Part:  right foot/arch     When did the symptoms begin/Date of Onset or date of surgery?           If over 55, have you farley an Osteoporosis Screening in the last 2 years? Yes    History of Present Illness  Right foot/arch pain      This is a 59 y.o. female  presents today complaining of bilateral foot pain.  She really relates no improvement.  She relates more of a new complaint which is pain in the forefoot behind her 2nd and 3rd toes.  Does have some numbness and radiating pain to those toes.    Review of Systems  System  Neg/Pos  Details  Constitutional  Negative  Chills, Fatigue, Fever and Night Sweats  Respiratory  Negative  Chest Pain, Cough and Dyspnea  Cardio   Negative  Leg Swelling  GI   Negative  Abdominal Pain, Constipation, Nausea and Vomiting     Negative  Urinary Incontinence   Endocrine  Negative  Weight Gain and Weight Loss  MS   Negative  Except as noted in HPI and Chief Complaint    Past Medical History:   Diagnosis Date    Allergic rhinitis     Anxiety     Arthritis     Osetopenia    Coles's cyst 3/2023    Carpal tunnel syndrome     Irritable bowel syndrome     Irritable bowel syndrome (IBS)     Sinus infection       Past Surgical History:   Procedure Laterality Date    ABDOMEN SURGERY  8/2009    Hysterectomy    BLADDER SURGERY  2013    vaginal tape    CARPAL TUNNEL RELEASE  10/2023    COLONOSCOPY  10/26/2015    Dr Nelson An-Oleksandr Dillard-Tubular AP, active colitis    COLONOSCOPY N/A 02/09/2021    Dr Moffett, Int Hemorrhoids Grade 1, 5 year recall    ELBOW SURGERY  10/2023    Cubital tunnel    HYSTERECTOMY (CERVIX STATUS UNKNOWN)      one ovary left    OVARY REMOVAL Right     SINUS SURGERY      TONSILLECTOMY      UPPER

## 2025-05-01 DIAGNOSIS — E78.00 PURE HYPERCHOLESTEROLEMIA: ICD-10-CM

## 2025-05-01 DIAGNOSIS — Z00.00 ANNUAL PHYSICAL EXAM: ICD-10-CM

## 2025-05-01 DIAGNOSIS — E55.9 VITAMIN D DEFICIENCY: ICD-10-CM

## 2025-05-01 DIAGNOSIS — F51.01 PRIMARY INSOMNIA: ICD-10-CM

## 2025-05-01 DIAGNOSIS — E03.8 SUBCLINICAL HYPOTHYROIDISM: ICD-10-CM

## 2025-05-01 DIAGNOSIS — F41.1 GENERALIZED ANXIETY DISORDER: ICD-10-CM

## 2025-05-01 LAB
25(OH)D3 SERPL-MCNC: 68.2 NG/ML
ALBUMIN SERPL-MCNC: 4.1 G/DL (ref 3.5–5.2)
ALP SERPL-CCNC: 75 U/L (ref 35–104)
ALT SERPL-CCNC: 40 U/L (ref 10–35)
ANION GAP SERPL CALCULATED.3IONS-SCNC: 6 MMOL/L (ref 8–16)
AST SERPL-CCNC: 35 U/L (ref 10–35)
BACTERIA URNS QL MICRO: NEGATIVE /HPF
BASOPHILS # BLD: 0 K/UL (ref 0–0.2)
BASOPHILS NFR BLD: 0.4 % (ref 0–1)
BILIRUB SERPL-MCNC: 0.7 MG/DL (ref 0.2–1.2)
BILIRUB UR QL STRIP: NEGATIVE
BUN SERPL-MCNC: 17 MG/DL (ref 6–20)
CALCIUM SERPL-MCNC: 9.8 MG/DL (ref 8.6–10)
CHLORIDE SERPL-SCNC: 105 MMOL/L (ref 98–107)
CHOLEST SERPL-MCNC: 208 MG/DL (ref 0–199)
CLARITY UR: CLEAR
CO2 SERPL-SCNC: 30 MMOL/L (ref 22–29)
COLOR UR: YELLOW
CREAT SERPL-MCNC: 0.8 MG/DL (ref 0.5–0.9)
CRYSTALS URNS MICRO: NORMAL /HPF
EOSINOPHIL # BLD: 0.1 K/UL (ref 0–0.6)
EOSINOPHIL NFR BLD: 2 % (ref 0–5)
EPI CELLS #/AREA URNS AUTO: 2 /HPF (ref 0–5)
ERYTHROCYTE [DISTWIDTH] IN BLOOD BY AUTOMATED COUNT: 13.8 % (ref 11.5–14.5)
GLUCOSE SERPL-MCNC: 93 MG/DL (ref 70–99)
GLUCOSE UR STRIP.AUTO-MCNC: NEGATIVE MG/DL
HCT VFR BLD AUTO: 43.5 % (ref 37–47)
HDLC SERPL-MCNC: 79 MG/DL (ref 40–60)
HGB BLD-MCNC: 14.2 G/DL (ref 12–16)
HGB UR STRIP.AUTO-MCNC: NEGATIVE MG/L
HYALINE CASTS #/AREA URNS AUTO: 1 /HPF (ref 0–8)
IMM GRANULOCYTES # BLD: 0 K/UL
KETONES UR STRIP.AUTO-MCNC: NEGATIVE MG/DL
LDLC SERPL CALC-MCNC: 118 MG/DL
LEUKOCYTE ESTERASE UR QL STRIP.AUTO: ABNORMAL
LYMPHOCYTES # BLD: 1.5 K/UL (ref 1.1–4.5)
LYMPHOCYTES NFR BLD: 34.5 % (ref 20–40)
MCH RBC QN AUTO: 30.5 PG (ref 27–31)
MCHC RBC AUTO-ENTMCNC: 32.6 G/DL (ref 33–37)
MCV RBC AUTO: 93.5 FL (ref 81–99)
MONOCYTES # BLD: 0.4 K/UL (ref 0–0.9)
MONOCYTES NFR BLD: 8.5 % (ref 0–10)
NEUTROPHILS # BLD: 2.4 K/UL (ref 1.5–7.5)
NEUTS SEG NFR BLD: 54.4 % (ref 50–65)
NITRITE UR QL STRIP.AUTO: NEGATIVE
PH UR STRIP.AUTO: 7 [PH] (ref 5–8)
PLATELET # BLD AUTO: 191 K/UL (ref 130–400)
PMV BLD AUTO: 11.5 FL (ref 9.4–12.3)
POTASSIUM SERPL-SCNC: 4.8 MMOL/L (ref 3.5–5.1)
PROT SERPL-MCNC: 6.5 G/DL (ref 6.4–8.3)
PROT UR STRIP.AUTO-MCNC: NEGATIVE MG/DL
RBC # BLD AUTO: 4.65 M/UL (ref 4.2–5.4)
RBC #/AREA URNS AUTO: 1 /HPF (ref 0–4)
SODIUM SERPL-SCNC: 141 MMOL/L (ref 136–145)
SP GR UR STRIP.AUTO: 1.01 (ref 1–1.03)
TRIGL SERPL-MCNC: 54 MG/DL (ref 0–149)
TSH SERPL DL<=0.005 MIU/L-ACNC: 2.12 UIU/ML (ref 0.27–4.2)
UROBILINOGEN UR STRIP.AUTO-MCNC: 0.2 E.U./DL
WBC # BLD AUTO: 4.5 K/UL (ref 4.8–10.8)
WBC #/AREA URNS AUTO: 2 /HPF (ref 0–5)

## 2025-05-07 ENCOUNTER — OFFICE VISIT (OUTPATIENT)
Dept: INTERNAL MEDICINE | Age: 60
End: 2025-05-07
Payer: COMMERCIAL

## 2025-05-07 VITALS
DIASTOLIC BLOOD PRESSURE: 68 MMHG | OXYGEN SATURATION: 99 % | SYSTOLIC BLOOD PRESSURE: 118 MMHG | HEART RATE: 91 BPM | WEIGHT: 141 LBS | BODY MASS INDEX: 22.76 KG/M2

## 2025-05-07 DIAGNOSIS — R74.01 ELEVATED ALT MEASUREMENT: ICD-10-CM

## 2025-05-07 DIAGNOSIS — E55.9 VITAMIN D DEFICIENCY: ICD-10-CM

## 2025-05-07 DIAGNOSIS — E78.00 PURE HYPERCHOLESTEROLEMIA: ICD-10-CM

## 2025-05-07 DIAGNOSIS — Z00.00 ANNUAL PHYSICAL EXAM: Primary | ICD-10-CM

## 2025-05-07 DIAGNOSIS — Z01.84 IMMUNITY STATUS TESTING: ICD-10-CM

## 2025-05-07 DIAGNOSIS — F51.01 PRIMARY INSOMNIA: ICD-10-CM

## 2025-05-07 PROCEDURE — 99396 PREV VISIT EST AGE 40-64: CPT | Performed by: INTERNAL MEDICINE

## 2025-05-07 RX ORDER — TEMAZEPAM 30 MG/1
CAPSULE ORAL
Qty: 30 CAPSULE | Refills: 2 | Status: SHIPPED | OUTPATIENT
Start: 2025-05-07 | End: 2025-08-26

## 2025-05-07 RX ORDER — EZETIMIBE 10 MG/1
10 TABLET ORAL DAILY
Qty: 90 TABLET | Refills: 1 | Status: SHIPPED | OUTPATIENT
Start: 2025-05-07 | End: 2025-05-08 | Stop reason: SDUPTHER

## 2025-05-07 RX ORDER — ESTRADIOL 0.05 MG/D
1 PATCH, EXTENDED RELEASE TRANSDERMAL
COMMUNITY
Start: 2025-02-27

## 2025-05-07 RX ORDER — MAGNESIUM GLUCONATE 27 MG(500)
500 TABLET ORAL 2 TIMES DAILY
COMMUNITY

## 2025-05-07 RX ORDER — CHLORAL HYDRATE 500 MG
CAPSULE ORAL
COMMUNITY

## 2025-05-07 ASSESSMENT — ENCOUNTER SYMPTOMS
CONSTIPATION: 1
SORE THROAT: 0
WHEEZING: 0
COUGH: 0
CHEST TIGHTNESS: 0
ABDOMINAL PAIN: 0

## 2025-05-07 NOTE — PROGRESS NOTES
Chief Complaint:   Roxy Cornelius is a 59 y.o. female who presents forcomplete physical exam.    History of Present Illness:      Roxy Cornelius is a 59 y.o. female who presents todayfor wellness visit AND follow up on her chronic medical conditions as noted below.      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    Exposure to influenza 08/29/2019    Status post dilatation of esophageal stricture 04/22/2019    Primary insomnia 07/17/2018    Osteopenia of multiple sites 07/17/2018     3/2018  * lumbar spine average T score is -1.45, she does have T score of -2.2 at L4  * Left hip T score -1.8, neck -1.9,troch -2.1  * Right hip total -1.6, neck -1.9,troch -1.8  * Radius total -1.5  5/2020 Lumbar T score -1.7( L4 -2.4)/ hip bilaterally -1.5/ -1.3   6/2022 - hip neck -.2.2/ L spine -2.0  5/2024 L spine lowest -2.0/ hip neck left -1.8; RT -2.0        Orthostatic dizziness 07/17/2018    Generalized anxiety disorder 04/17/2018    Menopause ovarian failure 04/17/2018       Past Medical History:   Diagnosis Date    Allergic rhinitis     Anxiety     Arthritis     Osetopenia    Coles's cyst 3/2023    Carpal tunnel syndrome     Irritable bowel syndrome     Irritable bowel syndrome (IBS)     Sinus infection        Past Surgical History:   Procedure Laterality Date    ABDOMEN SURGERY  8/2009    Hysterectomy    BLADDER SURGERY  2013    vaginal tape    CARPAL TUNNEL RELEASE  10/2023    COLONOSCOPY  10/26/2015    Dr Nelson Khan [place]-Tubular AP, active colitis    COLONOSCOPY N/A 02/09/2021    Dr Moffett, Int Hemorrhoids Grade 1, 5 year recall    ELBOW SURGERY  10/2023    Cubital tunnel    HYSTERECTOMY (CERVIX STATUS UNKNOWN)      one ovary left    OVARY REMOVAL Right     SINUS SURGERY      TONSILLECTOMY      UPPER GASTROINTESTINAL ENDOSCOPY  10/16/2015    Dr Nelson Khan

## 2025-05-08 ENCOUNTER — PATIENT MESSAGE (OUTPATIENT)
Dept: INTERNAL MEDICINE | Age: 60
End: 2025-05-08

## 2025-05-08 DIAGNOSIS — E78.00 PURE HYPERCHOLESTEROLEMIA: ICD-10-CM

## 2025-05-08 RX ORDER — EZETIMIBE 10 MG/1
10 TABLET ORAL DAILY
Qty: 90 TABLET | Refills: 1 | Status: SHIPPED | OUTPATIENT
Start: 2025-05-08

## 2025-05-08 NOTE — TELEPHONE ENCOUNTER
Roxy Cornelius called to request a refill on her medication.      Last office visit : 5/7/2025   Next office visit : 7/29/2025     Requested Prescriptions     Pending Prescriptions Disp Refills    ezetimibe (ZETIA) 10 MG tablet 90 tablet 1     Sig: Take 1 tablet by mouth daily            Salena Perez MA

## 2025-05-14 ENCOUNTER — PATIENT MESSAGE (OUTPATIENT)
Dept: INTERNAL MEDICINE | Age: 60
End: 2025-05-14

## 2025-05-14 DIAGNOSIS — E78.00 PURE HYPERCHOLESTEROLEMIA: ICD-10-CM

## 2025-05-14 RX ORDER — EZETIMIBE 10 MG/1
10 TABLET ORAL DAILY
Qty: 90 TABLET | Refills: 1 | Status: SHIPPED | OUTPATIENT
Start: 2025-05-14

## 2025-06-02 ENCOUNTER — OFFICE VISIT (OUTPATIENT)
Age: 60
End: 2025-06-02
Payer: COMMERCIAL

## 2025-06-02 VITALS — WEIGHT: 138 LBS | BODY MASS INDEX: 22.18 KG/M2 | HEIGHT: 66 IN

## 2025-06-02 DIAGNOSIS — G57.61 NEUROMA OF SECOND INTERSPACE OF RIGHT FOOT: Primary | ICD-10-CM

## 2025-06-02 DIAGNOSIS — M67.02 CONTRACTURE OF BOTH ACHILLES TENDONS: ICD-10-CM

## 2025-06-02 DIAGNOSIS — M77.41 METATARSALGIA OF BOTH FEET: ICD-10-CM

## 2025-06-02 DIAGNOSIS — M77.42 METATARSALGIA OF BOTH FEET: ICD-10-CM

## 2025-06-02 DIAGNOSIS — M67.01 CONTRACTURE OF BOTH ACHILLES TENDONS: ICD-10-CM

## 2025-06-02 DIAGNOSIS — M79.671 RIGHT FOOT PAIN: ICD-10-CM

## 2025-06-02 DIAGNOSIS — M19.071 PRIMARY OSTEOARTHRITIS OF RIGHT FOOT: ICD-10-CM

## 2025-06-02 PROCEDURE — 99213 OFFICE O/P EST LOW 20 MIN: CPT | Performed by: PODIATRIST

## 2025-06-02 PROCEDURE — 64455 NJX AA&/STRD PLTR COM DG NRV: CPT | Performed by: PODIATRIST

## 2025-06-02 RX ORDER — DEXAMETHASONE SODIUM PHOSPHATE 4 MG/ML
4 INJECTION, SOLUTION INTRA-ARTICULAR; INTRALESIONAL; INTRAMUSCULAR; INTRAVENOUS; SOFT TISSUE ONCE
Status: COMPLETED | OUTPATIENT
Start: 2025-06-02 | End: 2025-06-02

## 2025-06-02 RX ORDER — BUPIVACAINE HYDROCHLORIDE 5 MG/ML
1 INJECTION, SOLUTION EPIDURAL; INTRACAUDAL; PERINEURAL ONCE
Status: COMPLETED | OUTPATIENT
Start: 2025-06-02 | End: 2025-06-02

## 2025-06-02 RX ORDER — TRIAMCINOLONE ACETONIDE 40 MG/ML
20 INJECTION, SUSPENSION INTRA-ARTICULAR; INTRAMUSCULAR ONCE
Status: COMPLETED | OUTPATIENT
Start: 2025-06-02 | End: 2025-06-02

## 2025-06-02 RX ADMIN — DEXAMETHASONE SODIUM PHOSPHATE 4 MG: 4 INJECTION, SOLUTION INTRA-ARTICULAR; INTRALESIONAL; INTRAMUSCULAR; INTRAVENOUS; SOFT TISSUE at 13:40

## 2025-06-02 RX ADMIN — TRIAMCINOLONE ACETONIDE 20 MG: 40 INJECTION, SUSPENSION INTRA-ARTICULAR; INTRAMUSCULAR at 13:40

## 2025-06-02 RX ADMIN — BUPIVACAINE HYDROCHLORIDE 5 MG: 5 INJECTION, SOLUTION EPIDURAL; INTRACAUDAL; PERINEURAL at 13:39

## 2025-06-02 ASSESSMENT — PAIN DESCRIPTION - LOCATION: LOCATION: FOOT

## 2025-06-02 ASSESSMENT — PAIN SCALES - GENERAL: PAINLEVEL_OUTOF10: 5

## 2025-06-02 ASSESSMENT — PAIN DESCRIPTION - ORIENTATION: ORIENTATION: RIGHT

## 2025-06-02 NOTE — PROGRESS NOTES
Food in the Last Year: Never true   Transportation Needs: No Transportation Needs (1/28/2025)    PRAPARE - Transportation     Lack of Transportation (Medical): No     Lack of Transportation (Non-Medical): No   Housing Stability: Low Risk  (1/28/2025)    Housing Stability Vital Sign     Unable to Pay for Housing in the Last Year: No     Number of Times Moved in the Last Year: 0     Homeless in the Last Year: No      Social History     Occupational History    Occupation: Retired    Tobacco Use    Smoking status: Never    Smokeless tobacco: Never   Vaping Use    Vaping status: Never Used   Substance and Sexual Activity    Alcohol use: Yes     Alcohol/week: 1.0 standard drink of alcohol     Types: 1 Cans of beer per week     Comment: occ    Drug use: Never    Sexual activity: Yes     Partners: Male        Tobacco Use      Smoking status: Never      Smokeless tobacco: Never     Family History   Problem Relation Age of Onset    Cancer Father         Stomach maltoma    Parkinsonism Father     Stomach Cancer Father         maltoma    Cancer Mother         Lung cancers    Lung Cancer Mother     Colon Cancer Neg Hx     Colon Polyps Neg Hx         Medications  Current Outpatient Medications   Medication Sig Dispense Refill    ezetimibe (ZETIA) 10 MG tablet Take 1 tablet by mouth daily 90 tablet 1    magnesium gluconate (MAGONATE) 500 MG tablet Take 1 tablet by mouth 2 times daily      VITAMIN K PO Take by mouth      Omega-3 1000 MG CAPS Take by mouth      temazepam (RESTORIL) 30 MG capsule Take 1 capsule by mouth nightly as needed for anxiety or sleep. 30 capsule 2    estradiol (VIVELLE) 0.05 MG/24HR Place 1 patch onto the skin Twice a Week      meloxicam (MOBIC) 15 MG tablet Take 1 tablet by mouth daily 30 tablet 1    desvenlafaxine succinate (PRISTIQ) 25 MG TB24 extended release tablet Take 1 tablet by mouth daily 30 tablet 5    ondansetron (ZOFRAN-ODT) 4 MG disintegrating tablet Take 1 tablet by mouth 3 times daily

## 2025-07-07 ENCOUNTER — OFFICE VISIT (OUTPATIENT)
Age: 60
End: 2025-07-07
Payer: COMMERCIAL

## 2025-07-07 DIAGNOSIS — M79.671 RIGHT FOOT PAIN: ICD-10-CM

## 2025-07-07 DIAGNOSIS — G57.61 NEUROMA OF SECOND INTERSPACE OF RIGHT FOOT: Primary | ICD-10-CM

## 2025-07-07 PROCEDURE — 99213 OFFICE O/P EST LOW 20 MIN: CPT | Performed by: PODIATRIST

## 2025-07-07 RX ORDER — METHYLPREDNISOLONE 4 MG/1
TABLET ORAL
Qty: 1 KIT | Refills: 0 | Status: SHIPPED | OUTPATIENT
Start: 2025-07-07

## 2025-07-07 NOTE — PROGRESS NOTES
[facility]  [facility]  [address]  [facility] [id number] [phone]     Patient: Roxy Cornelius   YOB: 1965   Date: 7/7/2025   Visit Type:  Follow up    Body Part:  right foot  Last seen in clinic: 06/02/2025 Neuroma of second interspace of right foot     Pt states here for 6 week follow up of right foot / pt state the injection helped and pt stated the right foot is not hurting like it was, overall doing ok     History of Present Illness  Chief Complaint   Patient presents with    6 week follow up - right foot/arch       This is a 59 y.o. female  presents today complaining of right foot pain.  She does relate that the injection really helped her and it has improved.  She has continued physical therapy.  She does have a custom-made orthotic and does not go barefoot.    Review of Systems  System  Neg/Pos  Details  Constitutional  Negative  Chills, Fatigue, Fever and Night Sweats  Respiratory  Negative  Chest Pain, Cough and Dyspnea  Cardio   Negative  Leg Swelling  GI   Negative  Abdominal Pain, Constipation, Nausea and Vomiting     Negative  Urinary Incontinence   Endocrine  Negative  Weight Gain and Weight Loss  MS   Negative  Except as noted in HPI and Chief Complaint    Past Medical History:   Diagnosis Date    Allergic rhinitis     Anxiety     Arthritis     Osetopenia    Coles's cyst 3/2023    Carpal tunnel syndrome     Irritable bowel syndrome     Irritable bowel syndrome (IBS)     Sinus infection       Past Surgical History:   Procedure Laterality Date    ABDOMEN SURGERY  8/2009    Hysterectomy    BLADDER SURGERY  2013    vaginal tape    CARPAL TUNNEL RELEASE  10/2023    COLONOSCOPY  10/26/2015    Dr Nelson An-Oleksandr Dillard-Tubular AP, active colitis    COLONOSCOPY N/A 02/09/2021    Dr Moffett, Int Hemorrhoids Grade 1, 5 year recall    ELBOW SURGERY  10/2023    Cubital tunnel    HYSTERECTOMY (CERVIX STATUS

## 2025-07-18 DIAGNOSIS — Z01.84 IMMUNITY STATUS TESTING: ICD-10-CM

## 2025-07-18 DIAGNOSIS — R74.01 ELEVATED ALT MEASUREMENT: ICD-10-CM

## 2025-07-18 LAB
ALBUMIN SERPL-MCNC: 4.2 G/DL (ref 3.5–5.2)
ALP SERPL-CCNC: 77 U/L (ref 35–104)
ALT SERPL-CCNC: 64 U/L (ref 10–35)
AST SERPL-CCNC: 49 U/L (ref 10–35)
BILIRUB DIRECT SERPL-MCNC: 0.3 MG/DL (ref 0–0.3)
BILIRUB INDIRECT SERPL-MCNC: 0.4 MG/DL (ref 0–1)
BILIRUB SERPL-MCNC: 0.7 MG/DL (ref 0.2–1.2)
PROT SERPL-MCNC: 7 G/DL (ref 6.4–8.3)

## 2025-07-20 LAB — MEV IGG SER IA-ACNC: 114 AU/ML

## 2025-07-21 ENCOUNTER — HOSPITAL ENCOUNTER (OUTPATIENT)
Dept: NON INVASIVE DIAGNOSTICS | Age: 60
Discharge: HOME | End: 2025-07-23
Payer: COMMERCIAL

## 2025-07-21 ENCOUNTER — OFFICE VISIT (OUTPATIENT)
Dept: INTERNAL MEDICINE | Age: 60
End: 2025-07-21
Payer: COMMERCIAL

## 2025-07-21 VITALS
OXYGEN SATURATION: 98 % | DIASTOLIC BLOOD PRESSURE: 68 MMHG | WEIGHT: 137.4 LBS | SYSTOLIC BLOOD PRESSURE: 110 MMHG | HEIGHT: 66 IN | BODY MASS INDEX: 22.08 KG/M2 | HEART RATE: 72 BPM

## 2025-07-21 DIAGNOSIS — E55.9 VITAMIN D DEFICIENCY: ICD-10-CM

## 2025-07-21 DIAGNOSIS — R74.01 ELEVATED LIVER TRANSAMINASE LEVEL: ICD-10-CM

## 2025-07-21 DIAGNOSIS — K59.00 CONSTIPATION, UNSPECIFIED CONSTIPATION TYPE: ICD-10-CM

## 2025-07-21 DIAGNOSIS — E78.00 PURE HYPERCHOLESTEROLEMIA: Primary | ICD-10-CM

## 2025-07-21 DIAGNOSIS — R74.01 ELEVATED ALT MEASUREMENT: ICD-10-CM

## 2025-07-21 DIAGNOSIS — M79.661 RIGHT CALF PAIN: ICD-10-CM

## 2025-07-21 DIAGNOSIS — F51.01 PRIMARY INSOMNIA: ICD-10-CM

## 2025-07-21 LAB
HAV IGM SERPL QL IA: NONREACTIVE
HBV CORE IGM SERPL QL IA: NONREACTIVE
HBV SURFACE AG SERPL QL IA: NORMAL
HCV AB SERPL QL IA: NORMAL

## 2025-07-21 PROCEDURE — 99214 OFFICE O/P EST MOD 30 MIN: CPT | Performed by: INTERNAL MEDICINE

## 2025-07-21 PROCEDURE — 93971 EXTREMITY STUDY: CPT

## 2025-07-21 RX ORDER — DESVENLAFAXINE 25 MG/1
25 TABLET, EXTENDED RELEASE ORAL DAILY
Qty: 30 TABLET | Refills: 5 | Status: SHIPPED | OUTPATIENT
Start: 2025-07-21

## 2025-07-21 RX ORDER — TEMAZEPAM 30 MG/1
CAPSULE ORAL
Qty: 30 CAPSULE | Refills: 2 | Status: SHIPPED | OUTPATIENT
Start: 2025-07-21 | End: 2025-11-09

## 2025-07-21 ASSESSMENT — ENCOUNTER SYMPTOMS
CHEST TIGHTNESS: 0
ABDOMINAL PAIN: 0
COUGH: 0
CONSTIPATION: 0
WHEEZING: 0
SORE THROAT: 0

## 2025-07-21 NOTE — PROGRESS NOTES
Chief Complaint   Patient presents with    Other     Discuss labs      History of presenting illness:  Roxy Cornelius is a59 y.o. female who presents today for follow up on her chronic medical conditions as noted below.    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    Exposure to influenza 08/29/2019    Status post dilatation of esophageal stricture 04/22/2019    Primary insomnia 07/17/2018    Osteopenia of multiple sites 07/17/2018     Overview Note:     3/2018  * lumbar spine average T score is -1.45, she does have T score of -2.2 at L4  * Left hip T score -1.8, neck -1.9,troch -2.1  * Right hip total -1.6, neck -1.9,troch -1.8  * Radius total -1.5  5/2020 Lumbar T score -1.7( L4 -2.4)/ hip bilaterally -1.5/ -1.3   6/2022 - hip neck -.2.2/ L spine -2.0  5/2024 L spine lowest -2.0/ hip neck left -1.8; RT -2.0        Orthostatic dizziness 07/17/2018    Generalized anxiety disorder 04/17/2018    Menopause ovarian failure 04/17/2018     Past Medical History:   Diagnosis Date    Allergic rhinitis     Anxiety     Arthritis     Osetopenia    Coles's cyst 3/2023    Carpal tunnel syndrome     Irritable bowel syndrome     Irritable bowel syndrome (IBS)     Sinus infection       Past Surgical History:   Procedure Laterality Date    ABDOMEN SURGERY  8/2009    Hysterectomy    BLADDER SURGERY  2013    vaginal tape    CARPAL TUNNEL RELEASE  10/2023    COLONOSCOPY  10/26/2015    Dr Nelson Dillard-Tubular AP, active colitis    COLONOSCOPY N/A 02/09/2021    Dr Moffett, Int Hemorrhoids Grade 1, 5 year recall    ELBOW SURGERY  10/2023    Cubital tunnel    HYSTERECTOMY (CERVIX STATUS UNKNOWN)      one ovary left    OVARY REMOVAL Right     SINUS SURGERY      TONSILLECTOMY      UPPER GASTROINTESTINAL ENDOSCOPY  10/16/2015    Dr Nelson Dillard-w/balloon dil-18mm-

## 2025-07-24 LAB — ECHO BSA: 1.7 M2

## 2025-08-18 ENCOUNTER — PATIENT MESSAGE (OUTPATIENT)
Dept: INTERNAL MEDICINE | Age: 60
End: 2025-08-18

## 2025-08-18 DIAGNOSIS — R39.9 UTI SYMPTOMS: ICD-10-CM

## 2025-08-18 DIAGNOSIS — R39.9 UTI SYMPTOMS: Primary | ICD-10-CM

## 2025-08-18 LAB
BACTERIA URNS QL MICRO: ABNORMAL /HPF
BILIRUB UR QL STRIP: NEGATIVE
CLARITY UR: CLEAR
COARSE GRAN CASTS #/AREA URNS LPF: ABNORMAL /LPF (ref 0–5)
COLOR UR: YELLOW
FINE GRAN CASTS #/AREA URNS HPF: ABNORMAL /LPF (ref 0–2)
GLUCOSE UR STRIP.AUTO-MCNC: NEGATIVE MG/DL
HGB UR STRIP.AUTO-MCNC: NEGATIVE MG/L
HYALINE CASTS #/AREA URNS LPF: ABNORMAL /LPF (ref 0–5)
KETONES UR STRIP.AUTO-MCNC: NEGATIVE MG/DL
LEUKOCYTE ESTERASE UR QL STRIP.AUTO: ABNORMAL
NITRITE UR QL STRIP.AUTO: NEGATIVE
PH UR STRIP.AUTO: 7 [PH] (ref 5–8)
PROT UR STRIP.AUTO-MCNC: NEGATIVE MG/DL
RBC #/AREA URNS HPF: ABNORMAL /HPF (ref 0–2)
SP GR UR STRIP.AUTO: 1 (ref 1–1.03)
SQUAMOUS #/AREA URNS HPF: ABNORMAL /HPF
UROBILINOGEN UR STRIP.AUTO-MCNC: 0.2 E.U./DL
WBC #/AREA URNS HPF: ABNORMAL /HPF (ref 0–5)

## 2025-08-18 RX ORDER — CIPROFLOXACIN 250 MG/1
250 TABLET, FILM COATED ORAL 2 TIMES DAILY
Qty: 10 TABLET | Refills: 0 | Status: SHIPPED | OUTPATIENT
Start: 2025-08-18 | End: 2025-08-23

## 2025-08-20 LAB — BACTERIA UR CULT: NORMAL

## 2025-08-21 DIAGNOSIS — R74.01 ELEVATED LIVER TRANSAMINASE LEVEL: ICD-10-CM

## 2025-08-21 LAB
ALBUMIN SERPL-MCNC: 4.4 G/DL (ref 3.5–5.2)
ALP SERPL-CCNC: 83 U/L (ref 35–104)
ALT SERPL-CCNC: 37 U/L (ref 10–35)
AST SERPL-CCNC: 33 U/L (ref 10–35)
BILIRUB DIRECT SERPL-MCNC: 0.3 MG/DL (ref 0–0.3)
BILIRUB INDIRECT SERPL-MCNC: 0.4 MG/DL (ref 0–1)
BILIRUB SERPL-MCNC: 0.7 MG/DL (ref 0.2–1.2)
PROT SERPL-MCNC: 7.2 G/DL (ref 6.4–8.3)

## 2025-08-26 ENCOUNTER — HOSPITAL ENCOUNTER (OUTPATIENT)
Dept: WOMENS IMAGING | Age: 60
Discharge: HOME | End: 2025-08-26
Attending: INTERNAL MEDICINE
Payer: COMMERCIAL

## 2025-08-26 VITALS — BODY MASS INDEX: 21.69 KG/M2 | HEIGHT: 66 IN | WEIGHT: 135 LBS

## 2025-08-26 DIAGNOSIS — Z12.31 ENCOUNTER FOR SCREENING MAMMOGRAM FOR MALIGNANT NEOPLASM OF BREAST: ICD-10-CM

## 2025-08-26 PROCEDURE — 77063 BREAST TOMOSYNTHESIS BI: CPT

## (undated) DEVICE — CLEANING BRUSH - SINGLE USE: Brand: SAFEGUIDE®

## (undated) DEVICE — BRUSH ENDOSCP 2 END CHN HEDGEHOG

## (undated) DEVICE — FORCEPS BX L240CM JAW DIA2.4MM ORNG L CAP W/ NDL DISP RAD

## (undated) DEVICE — ENDO KIT,LOURDES HOSPITAL: Brand: MEDLINE INDUSTRIES, INC.

## (undated) DEVICE — SUPPLEMENT DIGESTIVE H2O SOL GI-EASE

## (undated) DEVICE — ADAPTER CLEANING PORPOISE CLEANING

## (undated) DEVICE — CLEANING SPONGE: Brand: KOALA™

## (undated) DEVICE — BITE BLOCK ENDOSCP AD 60 FR W/ ADJ STRP PLAS GRN BLOX

## (undated) DEVICE — CANNULA NSL AD L7FT DIV O2 CO2 W/ M LUERLOCK TRMPT CONN

## (undated) DEVICE — FORCEPS BX 240CM 2.4MM L NDL RAD JAW 4 M00513334

## (undated) DEVICE — SINGLE PORT MANIFOLD: Brand: NEPTUNE 2

## (undated) DEVICE — Z DISCONTINUED NO SUB IDED CAPSULE PH GASTROENTEROLOGY ES MON FOR REFLX DEL SYS BRAVO